# Patient Record
Sex: FEMALE | Race: WHITE | NOT HISPANIC OR LATINO | Employment: OTHER | URBAN - METROPOLITAN AREA
[De-identification: names, ages, dates, MRNs, and addresses within clinical notes are randomized per-mention and may not be internally consistent; named-entity substitution may affect disease eponyms.]

---

## 2017-01-26 ENCOUNTER — ALLSCRIPTS OFFICE VISIT (OUTPATIENT)
Dept: OTHER | Facility: OTHER | Age: 72
End: 2017-01-26

## 2017-02-15 ENCOUNTER — ALLSCRIPTS OFFICE VISIT (OUTPATIENT)
Dept: OTHER | Facility: OTHER | Age: 72
End: 2017-02-15

## 2017-02-15 DIAGNOSIS — N63.0 BREAST LUMP: ICD-10-CM

## 2017-02-15 DIAGNOSIS — Z12.31 ENCOUNTER FOR SCREENING MAMMOGRAM FOR MALIGNANT NEOPLASM OF BREAST: ICD-10-CM

## 2017-03-07 ENCOUNTER — APPOINTMENT (OUTPATIENT)
Dept: LAB | Facility: CLINIC | Age: 72
End: 2017-03-07
Payer: MEDICARE

## 2017-03-07 DIAGNOSIS — I10 ESSENTIAL (PRIMARY) HYPERTENSION: ICD-10-CM

## 2017-03-07 DIAGNOSIS — R73.01 IMPAIRED FASTING GLUCOSE: ICD-10-CM

## 2017-03-07 LAB
ALBUMIN SERPL BCP-MCNC: 3.9 G/DL (ref 3.5–5)
ALP SERPL-CCNC: 77 U/L (ref 46–116)
ALT SERPL W P-5'-P-CCNC: 27 U/L (ref 12–78)
ANION GAP SERPL CALCULATED.3IONS-SCNC: 9 MMOL/L (ref 4–13)
AST SERPL W P-5'-P-CCNC: 20 U/L (ref 5–45)
BASOPHILS # BLD AUTO: 0.02 THOUSANDS/ΜL (ref 0–0.1)
BASOPHILS NFR BLD AUTO: 0 % (ref 0–1)
BILIRUB SERPL-MCNC: 0.4 MG/DL (ref 0.2–1)
BUN SERPL-MCNC: 19 MG/DL (ref 5–25)
CALCIUM SERPL-MCNC: 9.4 MG/DL (ref 8.3–10.1)
CHLORIDE SERPL-SCNC: 101 MMOL/L (ref 100–108)
CHOLEST SERPL-MCNC: 146 MG/DL (ref 50–200)
CO2 SERPL-SCNC: 32 MMOL/L (ref 21–32)
CREAT SERPL-MCNC: 0.99 MG/DL (ref 0.6–1.3)
EOSINOPHIL # BLD AUTO: 0.13 THOUSAND/ΜL (ref 0–0.61)
EOSINOPHIL NFR BLD AUTO: 2 % (ref 0–6)
ERYTHROCYTE [DISTWIDTH] IN BLOOD BY AUTOMATED COUNT: 13.2 % (ref 11.6–15.1)
EST. AVERAGE GLUCOSE BLD GHB EST-MCNC: 137 MG/DL
GFR SERPL CREATININE-BSD FRML MDRD: 55.3 ML/MIN/1.73SQ M
GLUCOSE SERPL-MCNC: 121 MG/DL (ref 65–140)
HBA1C MFR BLD: 6.4 % (ref 4.2–6.3)
HCT VFR BLD AUTO: 39.2 % (ref 34.8–46.1)
HDLC SERPL-MCNC: 59 MG/DL (ref 40–60)
HGB BLD-MCNC: 12.9 G/DL (ref 11.5–15.4)
LDLC SERPL CALC-MCNC: 53 MG/DL (ref 0–100)
LYMPHOCYTES # BLD AUTO: 1.92 THOUSANDS/ΜL (ref 0.6–4.47)
LYMPHOCYTES NFR BLD AUTO: 31 % (ref 14–44)
MCH RBC QN AUTO: 30.1 PG (ref 26.8–34.3)
MCHC RBC AUTO-ENTMCNC: 32.9 G/DL (ref 31.4–37.4)
MCV RBC AUTO: 91 FL (ref 82–98)
MONOCYTES # BLD AUTO: 0.56 THOUSAND/ΜL (ref 0.17–1.22)
MONOCYTES NFR BLD AUTO: 9 % (ref 4–12)
NEUTROPHILS # BLD AUTO: 3.65 THOUSANDS/ΜL (ref 1.85–7.62)
NEUTS SEG NFR BLD AUTO: 58 % (ref 43–75)
PLATELET # BLD AUTO: 323 THOUSANDS/UL (ref 149–390)
PMV BLD AUTO: 9.5 FL (ref 8.9–12.7)
POTASSIUM SERPL-SCNC: 3.6 MMOL/L (ref 3.5–5.3)
PROT SERPL-MCNC: 7.4 G/DL (ref 6.4–8.2)
RBC # BLD AUTO: 4.29 MILLION/UL (ref 3.81–5.12)
SODIUM SERPL-SCNC: 142 MMOL/L (ref 136–145)
TRIGL SERPL-MCNC: 171 MG/DL
WBC # BLD AUTO: 6.28 THOUSAND/UL (ref 4.31–10.16)

## 2017-03-07 PROCEDURE — 36415 COLL VENOUS BLD VENIPUNCTURE: CPT

## 2017-03-07 PROCEDURE — 83036 HEMOGLOBIN GLYCOSYLATED A1C: CPT

## 2017-03-07 PROCEDURE — 85025 COMPLETE CBC W/AUTO DIFF WBC: CPT

## 2017-03-07 PROCEDURE — 80061 LIPID PANEL: CPT

## 2017-03-07 PROCEDURE — 80053 COMPREHEN METABOLIC PANEL: CPT

## 2017-03-23 ENCOUNTER — ALLSCRIPTS OFFICE VISIT (OUTPATIENT)
Dept: OTHER | Facility: OTHER | Age: 72
End: 2017-03-23

## 2017-03-23 DIAGNOSIS — R73.01 IMPAIRED FASTING GLUCOSE: ICD-10-CM

## 2017-03-23 DIAGNOSIS — E78.5 HYPERLIPIDEMIA: ICD-10-CM

## 2017-03-23 DIAGNOSIS — I10 ESSENTIAL (PRIMARY) HYPERTENSION: ICD-10-CM

## 2017-04-18 ENCOUNTER — HOSPITAL ENCOUNTER (OUTPATIENT)
Dept: MAMMOGRAPHY | Facility: CLINIC | Age: 72
Discharge: HOME | End: 2017-04-18
Payer: MEDICARE

## 2017-04-18 ENCOUNTER — HOSPITAL ENCOUNTER (OUTPATIENT)
Dept: ULTRASOUND IMAGING | Facility: CLINIC | Age: 72
Discharge: HOME | End: 2017-04-18
Payer: MEDICARE

## 2017-04-18 DIAGNOSIS — N63.0 BREAST LUMP: ICD-10-CM

## 2017-04-18 DIAGNOSIS — Z12.31 ENCOUNTER FOR SCREENING MAMMOGRAM FOR MALIGNANT NEOPLASM OF BREAST: ICD-10-CM

## 2017-04-18 PROCEDURE — G0202 SCR MAMMO BI INCL CAD: HCPCS

## 2017-04-18 PROCEDURE — 76642 ULTRASOUND BREAST LIMITED: CPT

## 2017-06-29 ENCOUNTER — ALLSCRIPTS OFFICE VISIT (OUTPATIENT)
Dept: OTHER | Facility: OTHER | Age: 72
End: 2017-06-29

## 2017-08-03 ENCOUNTER — ALLSCRIPTS OFFICE VISIT (OUTPATIENT)
Dept: OTHER | Facility: OTHER | Age: 72
End: 2017-08-03

## 2017-08-03 ENCOUNTER — TRANSCRIBE ORDERS (OUTPATIENT)
Dept: ADMINISTRATIVE | Facility: HOSPITAL | Age: 72
End: 2017-08-03

## 2017-08-03 DIAGNOSIS — G44.009 CLUSTER HEADACHE, NOT INTRACTABLE, UNSPECIFIED CHRONICITY PATTERN: Primary | ICD-10-CM

## 2017-08-08 ENCOUNTER — TRANSCRIBE ORDERS (OUTPATIENT)
Dept: SLEEP CENTER | Facility: CLINIC | Age: 72
End: 2017-08-08

## 2017-08-08 DIAGNOSIS — G47.33 OSA AND COPD OVERLAP SYNDROME (HCC): Primary | ICD-10-CM

## 2017-08-08 DIAGNOSIS — J44.9 OSA AND COPD OVERLAP SYNDROME (HCC): Primary | ICD-10-CM

## 2017-08-21 ENCOUNTER — HOSPITAL ENCOUNTER (OUTPATIENT)
Dept: MRI IMAGING | Facility: HOSPITAL | Age: 72
Discharge: HOME | End: 2017-08-21
Payer: MEDICARE

## 2017-08-21 DIAGNOSIS — G44.009 CLUSTER HEADACHE, NOT INTRACTABLE, UNSPECIFIED CHRONICITY PATTERN: ICD-10-CM

## 2017-08-21 PROCEDURE — 70551 MRI BRAIN STEM W/O DYE: CPT

## 2017-08-29 ENCOUNTER — APPOINTMENT (OUTPATIENT)
Dept: LAB | Facility: CLINIC | Age: 72
End: 2017-08-29
Payer: MEDICARE

## 2017-08-29 DIAGNOSIS — R73.01 IMPAIRED FASTING GLUCOSE: ICD-10-CM

## 2017-08-29 DIAGNOSIS — E78.5 HYPERLIPIDEMIA: ICD-10-CM

## 2017-08-29 DIAGNOSIS — I10 ESSENTIAL (PRIMARY) HYPERTENSION: ICD-10-CM

## 2017-08-29 LAB
ALBUMIN SERPL BCP-MCNC: 3.9 G/DL (ref 3.5–5)
ALP SERPL-CCNC: 84 U/L (ref 46–116)
ALT SERPL W P-5'-P-CCNC: 47 U/L (ref 12–78)
ANION GAP SERPL CALCULATED.3IONS-SCNC: 10 MMOL/L (ref 4–13)
AST SERPL W P-5'-P-CCNC: 29 U/L (ref 5–45)
BASOPHILS # BLD AUTO: 0.02 THOUSANDS/ΜL (ref 0–0.1)
BASOPHILS NFR BLD AUTO: 0 % (ref 0–1)
BILIRUB SERPL-MCNC: 0.4 MG/DL (ref 0.2–1)
BUN SERPL-MCNC: 22 MG/DL (ref 5–25)
CALCIUM SERPL-MCNC: 9.4 MG/DL (ref 8.3–10.1)
CHLORIDE SERPL-SCNC: 102 MMOL/L (ref 100–108)
CHOLEST SERPL-MCNC: 151 MG/DL (ref 50–200)
CO2 SERPL-SCNC: 30 MMOL/L (ref 21–32)
CREAT SERPL-MCNC: 1.17 MG/DL (ref 0.6–1.3)
EOSINOPHIL # BLD AUTO: 0.14 THOUSAND/ΜL (ref 0–0.61)
EOSINOPHIL NFR BLD AUTO: 2 % (ref 0–6)
ERYTHROCYTE [DISTWIDTH] IN BLOOD BY AUTOMATED COUNT: 13.1 % (ref 11.6–15.1)
EST. AVERAGE GLUCOSE BLD GHB EST-MCNC: 148 MG/DL
GFR SERPL CREATININE-BSD FRML MDRD: 47 ML/MIN/1.73SQ M
GLUCOSE P FAST SERPL-MCNC: 133 MG/DL (ref 65–99)
HBA1C MFR BLD: 6.8 % (ref 4.2–6.3)
HCT VFR BLD AUTO: 39.2 % (ref 34.8–46.1)
HDLC SERPL-MCNC: 57 MG/DL (ref 40–60)
HGB BLD-MCNC: 13 G/DL (ref 11.5–15.4)
LDLC SERPL CALC-MCNC: 56 MG/DL (ref 0–100)
LYMPHOCYTES # BLD AUTO: 1.77 THOUSANDS/ΜL (ref 0.6–4.47)
LYMPHOCYTES NFR BLD AUTO: 31 % (ref 14–44)
MCH RBC QN AUTO: 30.1 PG (ref 26.8–34.3)
MCHC RBC AUTO-ENTMCNC: 33.2 G/DL (ref 31.4–37.4)
MCV RBC AUTO: 91 FL (ref 82–98)
MONOCYTES # BLD AUTO: 0.48 THOUSAND/ΜL (ref 0.17–1.22)
MONOCYTES NFR BLD AUTO: 8 % (ref 4–12)
NEUTROPHILS # BLD AUTO: 3.4 THOUSANDS/ΜL (ref 1.85–7.62)
NEUTS SEG NFR BLD AUTO: 59 % (ref 43–75)
PLATELET # BLD AUTO: 339 THOUSANDS/UL (ref 149–390)
PMV BLD AUTO: 10.1 FL (ref 8.9–12.7)
POTASSIUM SERPL-SCNC: 3.3 MMOL/L (ref 3.5–5.3)
PROT SERPL-MCNC: 7.7 G/DL (ref 6.4–8.2)
RBC # BLD AUTO: 4.32 MILLION/UL (ref 3.81–5.12)
SODIUM SERPL-SCNC: 142 MMOL/L (ref 136–145)
TRIGL SERPL-MCNC: 192 MG/DL
WBC # BLD AUTO: 5.81 THOUSAND/UL (ref 4.31–10.16)

## 2017-08-29 PROCEDURE — 80061 LIPID PANEL: CPT

## 2017-08-29 PROCEDURE — 85025 COMPLETE CBC W/AUTO DIFF WBC: CPT

## 2017-08-29 PROCEDURE — 83036 HEMOGLOBIN GLYCOSYLATED A1C: CPT

## 2017-08-29 PROCEDURE — 36415 COLL VENOUS BLD VENIPUNCTURE: CPT

## 2017-08-29 PROCEDURE — 80053 COMPREHEN METABOLIC PANEL: CPT

## 2017-08-31 ENCOUNTER — ALLSCRIPTS OFFICE VISIT (OUTPATIENT)
Dept: OTHER | Facility: OTHER | Age: 72
End: 2017-08-31

## 2017-08-31 DIAGNOSIS — G44.201 INTRACTABLE TENSION-TYPE HEADACHE: ICD-10-CM

## 2017-08-31 DIAGNOSIS — R73.01 IMPAIRED FASTING GLUCOSE: ICD-10-CM

## 2017-09-19 ENCOUNTER — ALLSCRIPTS OFFICE VISIT (OUTPATIENT)
Dept: OTHER | Facility: OTHER | Age: 72
End: 2017-09-19

## 2017-09-28 ENCOUNTER — GENERIC CONVERSION - ENCOUNTER (OUTPATIENT)
Dept: OTHER | Facility: OTHER | Age: 72
End: 2017-09-28

## 2017-10-02 ENCOUNTER — GENERIC CONVERSION - ENCOUNTER (OUTPATIENT)
Dept: OTHER | Facility: OTHER | Age: 72
End: 2017-10-02

## 2017-10-04 ENCOUNTER — HOSPITAL ENCOUNTER (OUTPATIENT)
Dept: CT IMAGING | Facility: HOSPITAL | Age: 72
Discharge: HOME | End: 2017-10-04
Attending: PSYCHIATRY & NEUROLOGY
Payer: MEDICARE

## 2017-10-04 DIAGNOSIS — G44.201 INTRACTABLE TENSION-TYPE HEADACHE: ICD-10-CM

## 2017-10-04 PROCEDURE — 70498 CT ANGIOGRAPHY NECK: CPT

## 2017-10-04 PROCEDURE — 70496 CT ANGIOGRAPHY HEAD: CPT

## 2017-10-04 RX ADMIN — IODIXANOL 85 ML: 320 INJECTION, SOLUTION INTRAVASCULAR at 13:00

## 2017-10-11 ENCOUNTER — HOSPITAL ENCOUNTER (OUTPATIENT)
Dept: SLEEP CENTER | Facility: CLINIC | Age: 72
Discharge: HOME | End: 2017-10-11
Payer: MEDICARE

## 2017-10-11 ENCOUNTER — TRANSCRIBE ORDERS (OUTPATIENT)
Dept: SLEEP CENTER | Facility: CLINIC | Age: 72
End: 2017-10-11

## 2017-10-11 DIAGNOSIS — G47.33 OSA (OBSTRUCTIVE SLEEP APNEA): Primary | ICD-10-CM

## 2017-10-11 DIAGNOSIS — G47.33 OSA AND COPD OVERLAP SYNDROME (HCC): ICD-10-CM

## 2017-10-11 DIAGNOSIS — J44.9 OSA AND COPD OVERLAP SYNDROME (HCC): ICD-10-CM

## 2017-10-23 ENCOUNTER — ALLSCRIPTS OFFICE VISIT (OUTPATIENT)
Dept: OTHER | Facility: OTHER | Age: 72
End: 2017-10-23
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
## 2017-10-30 ENCOUNTER — GENERIC CONVERSION - ENCOUNTER (OUTPATIENT)
Dept: OTHER | Facility: OTHER | Age: 72
End: 2017-10-30

## 2017-12-18 ENCOUNTER — ALLSCRIPTS OFFICE VISIT (OUTPATIENT)
Dept: OTHER | Facility: OTHER | Age: 72
End: 2017-12-18
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
## 2017-12-28 ENCOUNTER — ALLSCRIPTS OFFICE VISIT (OUTPATIENT)
Dept: OTHER | Facility: OTHER | Age: 72
End: 2017-12-28

## 2018-01-12 VITALS
BODY MASS INDEX: 37.56 KG/M2 | RESPIRATION RATE: 16 BRPM | WEIGHT: 205.38 LBS | SYSTOLIC BLOOD PRESSURE: 124 MMHG | HEART RATE: 64 BPM | TEMPERATURE: 98.5 F | DIASTOLIC BLOOD PRESSURE: 82 MMHG

## 2018-01-12 VITALS
RESPIRATION RATE: 16 BRPM | HEART RATE: 74 BPM | DIASTOLIC BLOOD PRESSURE: 80 MMHG | WEIGHT: 205 LBS | TEMPERATURE: 98.6 F | BODY MASS INDEX: 37.73 KG/M2 | SYSTOLIC BLOOD PRESSURE: 130 MMHG | HEIGHT: 62 IN

## 2018-01-12 VITALS
WEIGHT: 203.13 LBS | SYSTOLIC BLOOD PRESSURE: 104 MMHG | HEART RATE: 80 BPM | TEMPERATURE: 99 F | BODY MASS INDEX: 37.38 KG/M2 | RESPIRATION RATE: 16 BRPM | HEIGHT: 62 IN | DIASTOLIC BLOOD PRESSURE: 58 MMHG

## 2018-01-12 VITALS
DIASTOLIC BLOOD PRESSURE: 80 MMHG | WEIGHT: 207.5 LBS | BODY MASS INDEX: 38.18 KG/M2 | HEIGHT: 62 IN | RESPIRATION RATE: 16 BRPM | TEMPERATURE: 97.9 F | SYSTOLIC BLOOD PRESSURE: 140 MMHG | HEART RATE: 78 BPM

## 2018-01-12 NOTE — MISCELLANEOUS
Message  Headaches returned after Medrol dose pack per patient  Okay to schedule occipital nerve blocks per pain management  We have only recently stopped doing these, thus could not mention to patient prior that I do not perform them here  Will try her on nortriptyline nightly as well  Discussed with clinical team to notify patient including adverse effects  Plan  Acute intractable tension-type headache, Bilateral occipital neuralgia    · Nortriptyline HCl - 10 MG Oral Capsule; TAKE 1 TABLET NIGHTLY  Bilateral occipital neuralgia    · Nortriptyline HCl - 25 MG Oral Capsule; TAKE 1 CAPSULE AT BEDTIME   Start after  10 mg dose completed    Signatures   Electronically signed by : Maxine Gray MD; Sep 28 2017  6:18PM EST                       (Author)
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
## 2018-01-13 VITALS
OXYGEN SATURATION: 97 % | RESPIRATION RATE: 14 BRPM | BODY MASS INDEX: 38.09 KG/M2 | DIASTOLIC BLOOD PRESSURE: 80 MMHG | HEART RATE: 91 BPM | WEIGHT: 207 LBS | SYSTOLIC BLOOD PRESSURE: 132 MMHG | HEIGHT: 62 IN

## 2018-01-13 VITALS
BODY MASS INDEX: 37.45 KG/M2 | HEIGHT: 62 IN | SYSTOLIC BLOOD PRESSURE: 118 MMHG | DIASTOLIC BLOOD PRESSURE: 72 MMHG | WEIGHT: 203.5 LBS

## 2018-01-13 VITALS
TEMPERATURE: 98.8 F | BODY MASS INDEX: 38.28 KG/M2 | RESPIRATION RATE: 16 BRPM | WEIGHT: 208 LBS | HEIGHT: 62 IN | DIASTOLIC BLOOD PRESSURE: 80 MMHG | SYSTOLIC BLOOD PRESSURE: 164 MMHG | HEART RATE: 82 BPM

## 2018-01-15 VITALS
DIASTOLIC BLOOD PRESSURE: 80 MMHG | WEIGHT: 205 LBS | HEART RATE: 117 BPM | SYSTOLIC BLOOD PRESSURE: 145 MMHG | TEMPERATURE: 99.6 F | HEIGHT: 62 IN | BODY MASS INDEX: 37.73 KG/M2

## 2018-01-17 NOTE — MISCELLANEOUS
Message  B/l occipital neuralgia: resolved with medrol dose pack but returned afterward  Occipital nerve blocks can be very helpful for this  As we cannot perform this at this time due to insurance coverage issues, will refer to pain management  I have tried her on Nortriptyline, and she states not helpful and/or made headaches worse- she can stop this, if not helpful  C/w Tizanidine  Discussed with clinical team, to notify patient  Plan  Bilateral occipital neuralgia    · *1 -  [facility]  Bilateral occipital nerve blocks,  thank you    Status: Active  Requested for: 38EXZ0071  Care Summary provided  : Yes    Signatures   Electronically signed by : Margo Farah MD; Oct  2 2017  2:34PM EST                       (Author)

## 2018-01-18 ENCOUNTER — TRANSCRIBE ORDERS (OUTPATIENT)
Dept: LAB | Facility: CLINIC | Age: 73
End: 2018-01-18

## 2018-01-18 ENCOUNTER — APPOINTMENT (OUTPATIENT)
Dept: LAB | Facility: CLINIC | Age: 73
End: 2018-01-18
Payer: MEDICARE

## 2018-01-18 ENCOUNTER — GENERIC CONVERSION - ENCOUNTER (OUTPATIENT)
Dept: OTHER | Facility: OTHER | Age: 73
End: 2018-01-18

## 2018-01-18 DIAGNOSIS — R73.01 IMPAIRED FASTING GLUCOSE: ICD-10-CM

## 2018-01-18 LAB
ALBUMIN SERPL BCP-MCNC: 3.8 G/DL (ref 3.5–5)
ALP SERPL-CCNC: 97 U/L (ref 46–116)
ALT SERPL W P-5'-P-CCNC: 32 U/L (ref 12–78)
ANION GAP SERPL CALCULATED.3IONS-SCNC: 12 MMOL/L (ref 4–13)
AST SERPL W P-5'-P-CCNC: 31 U/L (ref 5–45)
BASOPHILS # BLD AUTO: 0.03 THOUSANDS/ΜL (ref 0–0.1)
BASOPHILS NFR BLD AUTO: 1 % (ref 0–1)
BILIRUB SERPL-MCNC: 0.5 MG/DL (ref 0.2–1)
BUN SERPL-MCNC: 16 MG/DL (ref 5–25)
CALCIUM SERPL-MCNC: 9.5 MG/DL (ref 8.3–10.1)
CHLORIDE SERPL-SCNC: 100 MMOL/L (ref 100–108)
CHOLEST SERPL-MCNC: 158 MG/DL (ref 50–200)
CO2 SERPL-SCNC: 28 MMOL/L (ref 21–32)
CREAT SERPL-MCNC: 1.02 MG/DL (ref 0.6–1.3)
EOSINOPHIL # BLD AUTO: 0.2 THOUSAND/ΜL (ref 0–0.61)
EOSINOPHIL NFR BLD AUTO: 4 % (ref 0–6)
ERYTHROCYTE [DISTWIDTH] IN BLOOD BY AUTOMATED COUNT: 12.8 % (ref 11.6–15.1)
EST. AVERAGE GLUCOSE BLD GHB EST-MCNC: 151 MG/DL
GFR SERPL CREATININE-BSD FRML MDRD: 55 ML/MIN/1.73SQ M
GLUCOSE P FAST SERPL-MCNC: 159 MG/DL (ref 65–99)
HBA1C MFR BLD: 6.9 % (ref 4.2–6.3)
HCT VFR BLD AUTO: 37.9 % (ref 34.8–46.1)
HDLC SERPL-MCNC: 60 MG/DL (ref 40–60)
HGB BLD-MCNC: 13.1 G/DL (ref 11.5–15.4)
LDLC SERPL CALC-MCNC: 63 MG/DL (ref 0–100)
LYMPHOCYTES # BLD AUTO: 1.49 THOUSANDS/ΜL (ref 0.6–4.47)
LYMPHOCYTES NFR BLD AUTO: 27 % (ref 14–44)
MCH RBC QN AUTO: 30.9 PG (ref 26.8–34.3)
MCHC RBC AUTO-ENTMCNC: 34.6 G/DL (ref 31.4–37.4)
MCV RBC AUTO: 89 FL (ref 82–98)
MONOCYTES # BLD AUTO: 0.49 THOUSAND/ΜL (ref 0.17–1.22)
MONOCYTES NFR BLD AUTO: 9 % (ref 4–12)
NEUTROPHILS # BLD AUTO: 3.26 THOUSANDS/ΜL (ref 1.85–7.62)
NEUTS SEG NFR BLD AUTO: 59 % (ref 43–75)
PLATELET # BLD AUTO: 348 THOUSANDS/UL (ref 149–390)
PMV BLD AUTO: 9.6 FL (ref 8.9–12.7)
POTASSIUM SERPL-SCNC: 3.8 MMOL/L (ref 3.5–5.3)
PROT SERPL-MCNC: 7.5 G/DL (ref 6.4–8.2)
RBC # BLD AUTO: 4.24 MILLION/UL (ref 3.81–5.12)
SODIUM SERPL-SCNC: 140 MMOL/L (ref 136–145)
TRIGL SERPL-MCNC: 175 MG/DL
WBC # BLD AUTO: 5.47 THOUSAND/UL (ref 4.31–10.16)

## 2018-01-18 PROCEDURE — 80053 COMPREHEN METABOLIC PANEL: CPT

## 2018-01-18 PROCEDURE — 83036 HEMOGLOBIN GLYCOSYLATED A1C: CPT

## 2018-01-18 PROCEDURE — 85025 COMPLETE CBC W/AUTO DIFF WBC: CPT

## 2018-01-18 PROCEDURE — 80061 LIPID PANEL: CPT

## 2018-01-18 PROCEDURE — 36415 COLL VENOUS BLD VENIPUNCTURE: CPT

## 2018-01-22 VITALS
HEART RATE: 90 BPM | TEMPERATURE: 99 F | DIASTOLIC BLOOD PRESSURE: 80 MMHG | HEIGHT: 62 IN | SYSTOLIC BLOOD PRESSURE: 133 MMHG | BODY MASS INDEX: 37.79 KG/M2 | WEIGHT: 205.38 LBS

## 2018-01-23 VITALS
DIASTOLIC BLOOD PRESSURE: 60 MMHG | HEIGHT: 62 IN | SYSTOLIC BLOOD PRESSURE: 120 MMHG | BODY MASS INDEX: 38.67 KG/M2 | WEIGHT: 210.13 LBS | OXYGEN SATURATION: 97 % | HEART RATE: 75 BPM

## 2018-01-23 NOTE — MISCELLANEOUS
Message   Recorded as Task   Date: 01/18/2018 10:06 AM, Created By: Reuben Mcdonald   Task Name: Call Back   Assigned To: [facility],Team   Regarding Patient: Willa Ambrocio, Status: Active   Comment:    Lorin Lemos - 18 Jan 2018 10:06 AM     TASK CREATED  [address] [facility]- Patient called stating her headaches are getting worse and would like to s/w someone about what is going on   c/b [phone] or [phone]   [facility] - 18 Jan 2018 10:38 AM     TASK EDITED  S/W pt  Pt stated she got an injection for the pain on the top of her head from the crown to her right eye by JW  Pt stated she went to her eye doctor and the "eye pressure in my eye spiked really high" and now takes eye gtts at  since december  Pt stated last week her headache is "more severe like when they first started in June and July "  Now the pain radiates to the right side of the head to right ear, pain is constant and is 8/10  Pt stated she is taking Excedrin the last few days and it dulls the pain  Pt is taking Gabapentin, MG and B2 which are not helping  Pharmacy on file  Pt stated she called her neurologist on tuesday and has not got a call back yet so she is calling them again today  Please advise  Conor Forte - 18 Jan 2018 12:11 PM     TASK REPLIED TO: Previously Assigned To Conor Forte                      The patient can try increasing her gabapentin to 600 milligrams 1/2 tablet q a m  and 600 milligrams at bedtime  After she is on that dose for 1 week she can increase to 600 milligrams b i d   I can also repeat the occipital nerve blocks if she would like   Linda Hurley - 18 Jan 2018 12:28 PM     TASK EDITED  S/W pt  Advised pt of the same  Pt verbalized understanding  Pt stated she can not repeat the occipital nerve blocks  b/c "it affected the pressure in my eyes and is on an eye drop "  Pt stated she will try the gabapentin     Conor Forte - 18 Jan 2018 12:57 PM     TASK REPLIED TO: Previously Assigned To Brad Joseph                      Aware  Active Problems    1  Acute intractable tension-type headache (339 10) (G44 201)   2  Benign essential hypertension (401 1) (I10)   3  Bilateral hearing loss (389 9) (H91 93)   4  Bilateral occipital neuralgia (723 8) (M54 81)   5  Breast nodule (793 89) (N63 0)   6  Cervicalgia (723 1) (M54 2)   7  Esophageal reflux (530 81) (K21 9)   8  Generalized anxiety disorder (300 02) (F41 1)   9  History of pneumococcal vaccination (V49 89) (Z92 29)   10  Hyperlipidemia (272 4) (E78 5)   11  Impaired fasting glucose (790 21) (R73 01)   12  Post-traumatic headache (339 20) (G44 309)   13  Primary localized osteoarthritis of left knee (715 16) (M17 12)   14  Restless legs syndrome (333 94) (G25 81)   15  Tension type headache (339 10) (G44 209)    Current Meds   1  AmLODIPine Besylate 10 MG Oral Tablet; Take 1 tablet daily; Therapy: 62Gap7362 to (Evaluate:01Apr2018)  Requested for: 57NLI5305; Last   Rx:54Ark9676 Ordered   2  Aspir-81 81 MG Oral Tablet Delayed Release; Therapy: 82KJG3436 to Recorded   3  Atorvastatin Calcium 10 MG Oral Tablet; TAKE 1 TABLET AT BEDTIME; Therapy: 11WEB9037 to (Evaluate:17Mar2018)  Requested for: 87Lll5682; Last   Rx:60Bxq0443 Ordered   4  Gabapentin 600 MG Oral Tablet; TAKE 1 TABLET AT BEDTIME; Therapy: 17IBT3543 to (Evaluate:32Lar1119)  Requested for: 51Xet2449; Last   Rx:18Nis4359 Ordered   5  Klor-Con M20 20 MEQ Oral Tablet Extended Release; Take 1 tablet daily; Therapy: 93ZZO7517 to (Evaluate:50Brq5572)  Requested for: 22Ofb9869; Last   Rx:87Jtb5778 Ordered   6  Losartan Potassium 50 MG Oral Tablet; Take 1 tablet daily; Therapy: 40UXZ8405 to (Evaluate:01Apr2018)  Requested for: 39CAG8515; Last   Rx:69Deu2638 Ordered   7  Omeprazole 20 MG Oral Capsule Delayed Release; Take 1 capsule twice daily; Therapy: 78ZSI1523 to (Evaluate:01Apr2018)  Requested for: 51LYM2878; Last   Rx:03Oct2017 Ordered   8   TiZANidine HCl - 4 MG Oral Tablet; Take 0 5 tab nightly x 5 days, and one tab nightly x 5   days; Therapy: 56Vtl0633 to (Last Rx:51Zjd1106) Ordered   9  Triamterene-HCTZ 37 5-25 MG Oral Tablet; Take 1 tablet daily; Therapy: 58VRW6718 to (Evaluate:01Apr2018)  Requested for: 12MOX3701; Last   Rx:03Oct2017 Ordered    Allergies    1   Penicillins    Signatures   Electronically signed by : Roxana Horowitz, ; Jan 18 2018  1:04PM EST                       (Author)

## 2018-01-31 PROBLEM — M54.2 NECK PAIN: Status: ACTIVE | Noted: 2017-10-23

## 2018-01-31 PROBLEM — N63.0 BREAST NODULE: Status: ACTIVE | Noted: 2017-02-15

## 2018-01-31 PROBLEM — G44.201 ACUTE INTRACTABLE TENSION-TYPE HEADACHE: Status: ACTIVE | Noted: 2017-08-03

## 2018-01-31 PROBLEM — M54.81 BILATERAL OCCIPITAL NEURALGIA: Status: ACTIVE | Noted: 2017-09-19

## 2018-01-31 RX ORDER — FLUOROURACIL 50 MG/G
1 CREAM TOPICAL AS NEEDED
Refills: 1 | COMMUNITY
Start: 2017-11-07

## 2018-01-31 RX ORDER — NORTRIPTYLINE HYDROCHLORIDE 25 MG/1
1 CAPSULE ORAL DAILY
COMMUNITY
Start: 2017-09-28 | End: 2018-02-12 | Stop reason: ALTCHOICE

## 2018-01-31 RX ORDER — TIZANIDINE 4 MG/1
1 TABLET ORAL
COMMUNITY
Start: 2017-09-19 | End: 2018-02-12 | Stop reason: ALTCHOICE

## 2018-01-31 RX ORDER — LATANOPROST 50 UG/ML
1 SOLUTION/ DROPS OPHTHALMIC
Refills: 6 | COMMUNITY
Start: 2018-01-19 | End: 2018-02-12 | Stop reason: ALTCHOICE

## 2018-01-31 RX ORDER — AMLODIPINE BESYLATE 10 MG/1
1 TABLET ORAL DAILY
COMMUNITY
Start: 2012-03-20 | End: 2018-06-07 | Stop reason: ALTCHOICE

## 2018-01-31 RX ORDER — VERAPAMIL HYDROCHLORIDE 40 MG/1
1 TABLET ORAL 2 TIMES DAILY
Refills: 2 | COMMUNITY
Start: 2018-01-19 | End: 2018-01-31 | Stop reason: SDUPTHER

## 2018-01-31 RX ORDER — GABAPENTIN 600 MG/1
600 TABLET ORAL 2 TIMES DAILY
Refills: 2 | COMMUNITY
Start: 2018-01-18 | End: 2018-02-01 | Stop reason: SDUPTHER

## 2018-02-01 ENCOUNTER — OFFICE VISIT (OUTPATIENT)
Dept: FAMILY MEDICINE CLINIC | Facility: CLINIC | Age: 73
End: 2018-02-01
Payer: MEDICARE

## 2018-02-01 ENCOUNTER — TELEPHONE (OUTPATIENT)
Dept: PAIN MEDICINE | Facility: MEDICAL CENTER | Age: 73
End: 2018-02-01

## 2018-02-01 VITALS
HEART RATE: 78 BPM | WEIGHT: 211.6 LBS | TEMPERATURE: 98.8 F | BODY MASS INDEX: 37.49 KG/M2 | RESPIRATION RATE: 16 BRPM | DIASTOLIC BLOOD PRESSURE: 74 MMHG | SYSTOLIC BLOOD PRESSURE: 140 MMHG | HEIGHT: 63 IN

## 2018-02-01 DIAGNOSIS — M54.81 BILATERAL OCCIPITAL NEURALGIA: Primary | ICD-10-CM

## 2018-02-01 DIAGNOSIS — Z00.00 HEALTHCARE MAINTENANCE: ICD-10-CM

## 2018-02-01 DIAGNOSIS — I10 BENIGN ESSENTIAL HYPERTENSION: ICD-10-CM

## 2018-02-01 DIAGNOSIS — M54.81 BILATERAL OCCIPITAL NEURALGIA: ICD-10-CM

## 2018-02-01 DIAGNOSIS — G25.81 RESTLESS LEGS SYNDROME: ICD-10-CM

## 2018-02-01 DIAGNOSIS — F41.1 GENERALIZED ANXIETY DISORDER: ICD-10-CM

## 2018-02-01 DIAGNOSIS — E78.49 OTHER HYPERLIPIDEMIA: ICD-10-CM

## 2018-02-01 DIAGNOSIS — G44.201 ACUTE INTRACTABLE TENSION-TYPE HEADACHE: Primary | ICD-10-CM

## 2018-02-01 DIAGNOSIS — E11.9 TYPE 2 DIABETES MELLITUS WITHOUT COMPLICATION, WITHOUT LONG-TERM CURRENT USE OF INSULIN (HCC): ICD-10-CM

## 2018-02-01 PROCEDURE — 99214 OFFICE O/P EST MOD 30 MIN: CPT | Performed by: FAMILY MEDICINE

## 2018-02-01 RX ORDER — POTASSIUM CHLORIDE 750 MG/1
10 TABLET, EXTENDED RELEASE ORAL 2 TIMES DAILY
Qty: 180 TABLET | Refills: 2 | Status: SHIPPED | OUTPATIENT
Start: 2018-02-01 | End: 2018-10-29 | Stop reason: SDUPTHER

## 2018-02-01 RX ORDER — GABAPENTIN 600 MG/1
600 TABLET ORAL 2 TIMES DAILY
Qty: 60 TABLET | Refills: 2 | Status: SHIPPED | OUTPATIENT
Start: 2018-02-01 | End: 2018-02-12 | Stop reason: SDUPTHER

## 2018-02-01 NOTE — TELEPHONE ENCOUNTER
S/w pt, she is running out of her Gabapentin 600 mg, because her rx was for one at  but [address] [place] [address] increased it to BID after patient developed HA's  Pt requesting new rx  Pt states that she would like us to use her mail order pharmacy for all future rx's but at this time would like this rx called into [facility] b/c she might run out by the time it gets delivered  Pt has about 2-3 days left of rx  Please advise, thank you

## 2018-02-01 NOTE — ASSESSMENT & PLAN NOTE
Still has pain on the right side of head  Follow up with pain management and neurology and continue the medications as prescribed by them

## 2018-02-01 NOTE — TELEPHONE ENCOUNTER
Pt is calling stating that her gabapentin was filled in January and since then Dr Joseph has increased her dosage so now she is running out of her gabapentin   Please call pt back at [phone]

## 2018-02-01 NOTE — PROGRESS NOTES
Assessment/Plan:    Type 2 diabetes mellitus without complication (HCC)  Diabetes type 2 -   Lab Results   Component Value Date    HGBA1C 6 9 (H) 01/18/2018      Patient is controlled  Medication changes include none  Eye exam UTD - yes   Foot exam UTD - yes  ACEi - yes  Statin - yes  Discussed diet and exercise as part of the diabetic lifestyle  Problem List Items Addressed This Visit        Other    Acute intractable tension-type headache - Primary     See A/p for occiptal neuralgai         Relevant Medications    gabapentin (NEURONTIN) 600 MG tablet    nortriptyline (PAMELOR) 25 mg capsule    tiZANidine (ZANAFLEX) 4 mg tablet    amLODIPine (NORVASC) 10 mg tablet    Bilateral occipital neuralgia     Still has pain on the right side of head  Follow up with pain management and neurology and continue the medications as prescribed by them  Subjective:      Patient ID: Beau Zarate is a 71 y o  female  HA - saw neurology and pain management and opthalmology  Still has an ache on the right side of her head - for the most part this is controlled as an ache but does have some sharp pains that are much more severe  Has been on several medications - tizanidine, gabapentin, and nortriptyline she is following their instructions  She was given verapamil to help  The following portions of the patient's history were reviewed and updated as appropriate: She is allergic to penicillins       Review of Systems   Constitutional: Negative for activity change and appetite change  HENT: Negative for congestion  Respiratory: Negative for cough, chest tightness and shortness of breath  Cardiovascular: Negative for chest pain  Genitourinary: Negative for difficulty urinating  Musculoskeletal: Positive for myalgias and neck pain  Negative for arthralgias and back pain  Neurological: Negative for dizziness  Psychiatric/Behavioral: Negative for agitation   The patient is not nervous/anxious  Objective:     Physical Exam   Constitutional: She is oriented to person, place, and time  She appears well-developed and well-nourished  HENT:   Head: Normocephalic  Eyes: EOM are normal  Pupils are equal, round, and reactive to light  Neck: Normal range of motion  Neck supple  Cardiovascular: Normal rate, regular rhythm and normal heart sounds  Abdominal: Soft  Bowel sounds are normal    Musculoskeletal: Normal range of motion  Neurological: She is alert and oriented to person, place, and time  Skin: Skin is warm and dry  Psychiatric: She has a normal mood and affect

## 2018-02-01 NOTE — ASSESSMENT & PLAN NOTE
Diabetes type 2 -   Lab Results   Component Value Date    HGBA1C 6 9 (H) 01/18/2018      Patient is controlled  Medication changes include none  Eye exam UTD - yes - will look for results  Foot exam UTD - will do at next visit  ACEi - yes - arb  Statin - yes  Discussed diet and exercise as part of the diabetic lifestyle

## 2018-02-01 NOTE — ASSESSMENT & PLAN NOTE
Hypertension- patients hypertension is controlled today with a Blood pressure of Blood Pressure: 140/74     on current medications  Continue current medications  Discussed DASH diet and exercise

## 2018-02-01 NOTE — ASSESSMENT & PLAN NOTE
Hyperlipidemia - 71 y  o female being evaluated for HLD  No myalgias  Continue medication  Last LDL   Lab Results   Component Value Date    LDLCALC 63 01/18/2018     ASCVD risk of      Goal of being on a moderate dose statin    is at goal

## 2018-02-06 PROCEDURE — 88305 TISSUE EXAM BY PATHOLOGIST: CPT | Performed by: INTERNAL MEDICINE

## 2018-02-07 ENCOUNTER — LAB REQUISITION (OUTPATIENT)
Dept: LAB | Facility: HOSPITAL | Age: 73
End: 2018-02-07
Payer: MEDICARE

## 2018-02-07 DIAGNOSIS — Z86.010 HISTORY OF COLONIC POLYPS: ICD-10-CM

## 2018-02-07 DIAGNOSIS — K57.30 DIVERTICULOSIS OF LARGE INTESTINE WITHOUT PERFORATION OR ABSCESS WITHOUT BLEEDING: ICD-10-CM

## 2018-02-07 DIAGNOSIS — D12.0 BENIGN NEOPLASM OF CECUM: ICD-10-CM

## 2018-02-12 ENCOUNTER — CLINICAL SUPPORT (OUTPATIENT)
Dept: PAIN MEDICINE | Facility: CLINIC | Age: 73
End: 2018-02-12
Payer: MEDICARE

## 2018-02-12 VITALS
SYSTOLIC BLOOD PRESSURE: 121 MMHG | TEMPERATURE: 98.8 F | WEIGHT: 210 LBS | BODY MASS INDEX: 37.21 KG/M2 | DIASTOLIC BLOOD PRESSURE: 68 MMHG | HEIGHT: 63 IN | HEART RATE: 85 BPM

## 2018-02-12 DIAGNOSIS — M54.81 BILATERAL OCCIPITAL NEURALGIA: Primary | ICD-10-CM

## 2018-02-12 DIAGNOSIS — G25.81 RESTLESS LEGS SYNDROME: ICD-10-CM

## 2018-02-12 PROCEDURE — 99213 OFFICE O/P EST LOW 20 MIN: CPT | Performed by: ANESTHESIOLOGY

## 2018-02-12 RX ORDER — GABAPENTIN 600 MG/1
600 TABLET ORAL 2 TIMES DAILY
Qty: 60 TABLET | Refills: 2 | Status: SHIPPED | OUTPATIENT
Start: 2018-02-12 | End: 2018-07-03 | Stop reason: SDUPTHER

## 2018-02-12 NOTE — PROGRESS NOTES
Assessment:  1  Bilateral occipital neuralgia    2  Restless legs syndrome        Plan:  81-year-old female returning for follow-up of occipital headaches secondary to occipital neuralgia  The patient has had favorable responses to occipital nerve blocks, however with the most recent injection she had noticed that after visiting her ophthalmologist that her intra-ocular pressures were increased  Fortunately this had resolved with some eyedrops C8 given her  She is currently taking gabapentin 600 mg b i d  and has noted some relief with this  She was also taking tizanidine 4 mg 1/2 to 1 tablet q h s  p r n  with some relief  She states that her neurologist the head advised the patient to start titrating down on the gabapentin and she was going to start verapamil, however the patient was finding relief with the gabapentin and is reluctant on titrating off the medication  I did discuss with the patient that it would be reasonable to contact the neurologist to find out if it would be okay to take the verapamil in conjunction with the gabapentin and the patient was agreeable to this  Most of her pain is right-sided today she barely has any pain on the left side  1   The patient may continue with gabapentin 600 mg b i d   2   I advised the patient to contact her neurologist to see if she can start the verapamil without titrating off of the gabapentin since she is finding relief with this  3  The patient may continue with tizanidine 4 mg 1/2 to 1 tablet q h s  p r n   4   We may consider repeating occipital nerve blocks, however we will hold off for now and we will coordinate with her ophthalmologist considering this steroids increased her intra-ocular pressure to make sure that she has a follow-up scheduled and possibly eyedrops ordered to manage the increase in pressure  5  The patient will follow-up with neurology as scheduled  6    I will follow up the patient in 2 months    My impressions and treatment recommendations were discussed in detail with the patient who verbalized understanding and had no further questions  Discharge instructions were provided  I personally saw and examined the patient and I agree with the above discussed plan of care  No orders of the defined types were placed in this encounter  No orders of the defined types were placed in this encounter  History of Present Illness:    Ferny Edmond is a 71 y o  female returning for follow-up of occipital neuralgia  The patient also has a history of restless leg syndrome  She notes that her headaches are mostly right-sided  She denies any visual or auditory symptoms  The patient did have good relief from occipital nerve blocks, however with her most recent injection when she was seen not the ophthalmologist it was noted that her intra-ocular pressure was increased likely as a result from the steroid injections  Fortunately the pressures normalized with some eyedrops  The patient states that her ophthalmologist stated that this did not preclude her from having repeat steroid injections, however she would have to coordinate with him in order to have some of the eyedrops on hand for when she gets the steroid injections  She continues on gabapentin 600 mg b i d  and the patient has noted some relief with this  The patient was also ordered verapamil by her neurologist, however she has not started this yet  Her neurologist also discussed with her about starting to titrate off of the gabapentin, however the patient is reluctant to do so as she is noting relief since increasing the dose of it  The patient rates her pain a 4/10 on the pain is worse in the evening  The pain is constant and described as dull, aching, pressure-like, and shooting  She is getting approximately 20% relief from the current medicine regimen  She denies any side effects from the medications  The pain is worse with manual pressure to her right occipital region  The pain is alleviated somewhat with relaxation  I have personally reviewed and/or updated the patient's past medical history, past surgical history, family history, social history, current medications, allergies, and vital signs today  Other than as stated above, the patient denies any interval changes in medications, medical condition, mental condition, symptoms, or allergies since the last office visit  Review of Systems:    Review of Systems   Respiratory: Negative for shortness of breath  Cardiovascular: Negative for chest pain  Gastrointestinal: Negative for constipation, diarrhea, nausea and vomiting  Musculoskeletal: Negative for arthralgias, gait problem, joint swelling and myalgias  Skin: Negative for rash  Neurological: Negative for dizziness, seizures and weakness  All other systems reviewed and are negative        Patient Active Problem List   Diagnosis    Left medial knee pain    Acute intractable tension-type headache    Benign essential hypertension    Bilateral hearing loss    Bilateral occipital neuralgia    Breast nodule    Neck pain    Esophageal reflux    Generalized anxiety disorder    Hyperlipidemia    Primary localized osteoarthritis of left knee    Restless legs syndrome    [facility]    Healthcare maintenance       Past Medical History:   Diagnosis Date    Arthritis     GERD (gastroesophageal reflux disease)     Hyperlipidemia     Hypertension     Hypokalemia     Left medial knee pain     Restless leg syndrome        Past Surgical History:   Procedure Laterality Date    ANKLE FRACTURE SURGERY Right     ARTHROSCOPY KNEE Left 5/26/2016    Procedure: ARTHROSCOPY KNEE, PARTILA MEDIAL MENISECTOMY;  Surgeon: Ev Casper MD;  Location: [facility];  Service:    [email or web address] CHOLECYSTECTOMY      HYSTERECTOMY      SQUAMOUS CELL CARCINOMA EXCISION      forehead       Family History   Problem Relation Age of Onset  Stroke Mother     Stroke Father     Hypertension Other     Diabetes Other        Social History     Occupational History    Not on file  Social History Main Topics    Smoking status: Never Smoker    Smokeless tobacco: Never Used    Alcohol use Yes      Comment: rare    Drug use: No    Sexual activity: Not on file       Current Outpatient Prescriptions on File Prior to Visit   Medication Sig    amLODIPine (NORVASC) 10 mg tablet Take 1 tablet by mouth daily    aspirin 81 MG tablet Take 81 mg by mouth daily   atorvastatin (LIPITOR) 10 mg tablet 10 mg daily  Atorvastatin Calcium 10 MG Oral Tablet TAKE 1 TABLET DAILY AT     BEDTIME  Quantity: 90;  Refills: 1    Erick Hernandez MD;  Started 31-July-2014 Active     fluorouracil (EFUDEX) 5 % cream Apply 1 application topically daily    gabapentin (NEURONTIN) 600 MG tablet Take 1 tablet (600 mg total) by mouth 2 (two) times a day    HYDROcodone-acetaminophen (NORCO) 5-325 mg per tablet Take 1 tablet by mouth every 6 (six) hours as needed for moderate pain  Max Daily Amount: 4 tablets    losartan (COZAAR) 50 mg tablet 50 mg daily  Losartan Potassium 50 MG Oral Tablet Take 1 tablet daily  Quantity: 90;  Refills: 1    Petra Dawkins DO;  Started 27-June-2012 Active     omeprazole (PriLOSEC) 20 mg delayed release capsule Take 20 mg by mouth daily   potassium chloride (K-DUR,KLOR-CON) 10 mEq tablet Take 1 tablet (10 mEq total) by mouth 2 (two) times a day for 90 days    triamterene-hydrochlorothiazide (MAXZIDE-25) 37 5-25 mg per tablet 1 tablet daily  Triamterene-HCTZ 37 5-25 MG Oral Tablet Take 1 tablet daily  Quantity: 90;  Refills: 1    Petra Dawkins DO;  Started 9-Mar-2012 Active     valACYclovir (VALTREX) 1,000 mg tablet Take 1 tablet by mouth 3 (three) times a day      [DISCONTINUED] latanoprost (XALATAN) 0 005 % ophthalmic solution Administer 1 drop to both eyes daily at bedtime    [DISCONTINUED] nortriptyline (PAMELOR) 25 mg capsule Take 1 capsule by mouth daily    [DISCONTINUED] tiZANidine (ZANAFLEX) 4 mg tablet Take 1 tablet by mouth daily at bedtime     No current facility-administered medications on file prior to visit  Allergies   Allergen Reactions    Penicillins Rash     itching       Physical Exam:    /68   Pulse 85   Temp 98 8 °F (37 1 °C)   Ht 5' 2 8" (1 595 m)   Wt 95 3 kg (210 lb)   BMI 37 44 kg/m²     Constitutional: obese  Eyes: anicteric  HEENT: grossly intact  Neck: supple, symmetric, trachea midline and no masses   Pulmonary:even and unlabored  Cardiovascular:No edema or pitting edema present  Skin:Normal without rashes or lesions and well hydrated  Psychiatric:Mood and affect appropriate  Neurologic:Cranial Nerves II-XII grossly intact  Mild tenderness to palpation over the right occipital groove  Left occipital groove was nontender to palpation  Musculoskeletal:normal gait  Bilateral cervical paraspinals were and trapezii were nontender to palpation      Imaging  Imaging reviewed

## 2018-02-13 ENCOUNTER — TELEPHONE (OUTPATIENT)
Dept: NEUROLOGY | Facility: CLINIC | Age: 73
End: 2018-02-13

## 2018-02-13 RX ORDER — GABAPENTIN 600 MG/1
600 TABLET ORAL 2 TIMES DAILY
Qty: 180 TABLET | Refills: 0 | Status: SHIPPED | OUTPATIENT
Start: 2018-02-13 | End: 2018-02-19 | Stop reason: SDUPTHER

## 2018-02-13 RX ORDER — GABAPENTIN 600 MG/1
600 TABLET ORAL 2 TIMES DAILY
Qty: 180 TABLET | Refills: 1 | Status: SHIPPED | OUTPATIENT
Start: 2018-02-13 | End: 2018-02-13 | Stop reason: SDUPTHER

## 2018-02-13 NOTE — TELEPHONE ENCOUNTER
Please see 1/16 [employer] task for additional info  Pt called questioning if she can take gabapentin + verapamil  Pt states that you had advised her to wean off gabapentin as she started/titrated verapamil  However, I do not see mention of this in task  Per task, pt to start verapamil if gabapentin ineffective  PM recently increased her gabapentin to 600mg BID  She did not start verapamil as suggested on 1/16  States increased gabapentin dose is helping but not completely addressing the HA's  Please see PM 2/12 note for more info  Pt has follow up w/ you 2/19  Please advise      [place] [phone]  [place] [phone]

## 2018-02-13 NOTE — TELEPHONE ENCOUNTER
Ok  Please add verapamil to gabapentin at this time  It is safe  No interaction  It seems PM wants her to be on it and that is fine  If verapamil seems to work, she can then try to wean gabapentin but FIRSTLY start verapamil  Thanks

## 2018-02-13 NOTE — TELEPHONE ENCOUNTER
Pt called stating that she received a call from the pharmacy regarding the rx for Gabapentin  States that the prescription that was sent over was only for a 60 day supply but they require it to be for 90 days  Pt is asking if we can change it or send a new script over  Pt uses [facility] (on file)  Pt can be reached at [phone]

## 2018-02-19 ENCOUNTER — OFFICE VISIT (OUTPATIENT)
Dept: NEUROLOGY | Facility: CLINIC | Age: 73
End: 2018-02-19
Payer: MEDICARE

## 2018-02-19 VITALS
HEIGHT: 63 IN | SYSTOLIC BLOOD PRESSURE: 118 MMHG | DIASTOLIC BLOOD PRESSURE: 66 MMHG | BODY MASS INDEX: 37.56 KG/M2 | WEIGHT: 212 LBS | HEART RATE: 71 BPM

## 2018-02-19 DIAGNOSIS — G44.85 STABBING HEADACHE: ICD-10-CM

## 2018-02-19 DIAGNOSIS — M54.81 BILATERAL OCCIPITAL NEURALGIA: Primary | ICD-10-CM

## 2018-02-19 PROCEDURE — 99213 OFFICE O/P EST LOW 20 MIN: CPT | Performed by: PHYSICIAN ASSISTANT

## 2018-02-19 RX ORDER — VERAPAMIL HYDROCHLORIDE 40 MG/1
40 TABLET ORAL 2 TIMES DAILY
Qty: 180 TABLET | Refills: 1 | Status: SHIPPED | OUTPATIENT
Start: 2018-02-19 | End: 2018-03-16 | Stop reason: DRUGHIGH
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
## 2018-02-19 NOTE — PATIENT INSTRUCTIONS
For headache prevention: continue Verapamil 40 mg 2x/day  Magnesium- 250- 500 mg daily if constipation is a problem with verapamil  B2- also good for headaches 100- 200 mg daily  As needed, continue Exedrin migraine, no more than 1-2 per week

## 2018-02-20 NOTE — ASSESSMENT & PLAN NOTE
She is doing better on a combination of Gabapentin and Verapamil  States verapamil prevents night time headache awakening and she sleeps much better  Has only been on verapamil for 5 days, and only the loading dose thus far, so we will have her continue 40 mg BID as scheduled and give it 4-6 weeks  If not improved- resolved by then we may need to implement an alternative therapy, ie  Topamax, cymbalta or Robaxin  She will continue Excedrin migraine prn, but no more than 1-2 per week to avoid [facility]  She feels she needs it much less after starting the prevention medications gabap and verapamil  We also dsicussed utility of a cervical MRI but will place this on hold unless her sxs worsen  I encouraged her to do PT for probable cervicogenic etiology for HAs

## 2018-02-20 NOTE — ASSESSMENT & PLAN NOTE
So called b/c she describes sharp, transient pains not a/w autonomic features or migrainous features  Verapamil may improved this

## 2018-03-15 ENCOUNTER — TELEPHONE (OUTPATIENT)
Dept: NEUROLOGY | Facility: CLINIC | Age: 73
End: 2018-03-15

## 2018-03-15 DIAGNOSIS — M54.81 BILATERAL OCCIPITAL NEURALGIA: ICD-10-CM

## 2018-03-15 DIAGNOSIS — G44.85 STABBING HEADACHE: ICD-10-CM

## 2018-03-15 NOTE — TELEPHONE ENCOUNTER
----- Message from Jesus Vail sent at 3/15/2018 10:38 AM EDT -----  Regarding: Visit Follow-Up Question  Contact: [phone]  Amber Irving - Just a quick note that all seems to be the same regarding my headache nerve issue since our last visit  I am taking the Gabapentin 600 mg in evening and in morning  Also taking the verapamill 40 mg both evening and morning  Have been trying to give it time for them to work together  It appears that these meds are keeping pain in manageable way but they are not eliminating the pain and its variable intensity, which generally intensifies in later afternoon and early evening  When I was in last visit, one of the things you mentioned to me was an MRI of my neck  After leaving I recalled that the CAT scan I had was of my head and neck so maybe you could review that to see if that covered what you were discussing with me  I wanted you to be aware that although I am struggling through with the above meds and was trying to give them time, there appears to be no difference, so I would like to know Dr Eli Eason suggestions as this is getting close to a year now that I've  been dealing with these issues  I was wondering if you or she would recommend going back on the muscle relaxant Tizanidine which I stopped after Dr Ever Blackburn injections  I would be up for another visit before April 23 if you or Dr Marquis Maurer  have the time to discuss future options  Thank you so much for listening    John Prom

## 2018-03-15 NOTE — TELEPHONE ENCOUNTER
Please ask her to increase verapamil to 80 mg qhs and continue 40 mg qam  I will send muscle relaxant robaxin, unless she found tizanidine helpful  Robaxin typically more helpful  CT head and neck looked just at blood vessels and not nerves, so would be good to look at her c-spine in an MRI at this point  Please schedule earlier with Dr Bria Mosley if possible

## 2018-03-16 RX ORDER — VERAPAMIL HYDROCHLORIDE 80 MG/1
TABLET ORAL
Qty: 180 TABLET | Refills: 1 | Status: SHIPPED | OUTPATIENT
Start: 2018-03-16 | End: 2018-07-06 | Stop reason: SDUPTHER

## 2018-03-16 RX ORDER — METHOCARBAMOL 500 MG/1
TABLET, FILM COATED ORAL
Qty: 90 TABLET | Refills: 0 | Status: SHIPPED | OUTPATIENT
Start: 2018-03-16 | End: 2018-04-09 | Stop reason: CLARIF

## 2018-03-16 NOTE — TELEPHONE ENCOUNTER
Called and spoke with the patient- she is aware to increase her verapamil as directed below  Patient would also like to try the Robaxin since it might be more helpful  Please send a new script for new directions to pts mail order pharmacy, please also send the Robaxin to mail order as well  I did let the patient know about the CT of the head and neck- she states that is good to know, and would like to try the change in medications to see if that helps prior to getting the MRI done  She states she has a visit with you on 4/23, which at that time if the medications are not helping she will discuss with you about completing the MRI

## 2018-03-20 ENCOUNTER — OFFICE VISIT (OUTPATIENT)
Dept: FAMILY MEDICINE CLINIC | Facility: CLINIC | Age: 73
End: 2018-03-20
Payer: MEDICARE

## 2018-03-20 VITALS
BODY MASS INDEX: 37.78 KG/M2 | HEIGHT: 63 IN | RESPIRATION RATE: 16 BRPM | WEIGHT: 213.2 LBS | DIASTOLIC BLOOD PRESSURE: 70 MMHG | SYSTOLIC BLOOD PRESSURE: 120 MMHG | TEMPERATURE: 99.6 F | HEART RATE: 84 BPM

## 2018-03-20 DIAGNOSIS — R60.9 PERIPHERAL EDEMA: Primary | ICD-10-CM

## 2018-03-20 DIAGNOSIS — E11.9 TYPE 2 DIABETES MELLITUS WITHOUT COMPLICATION, WITHOUT LONG-TERM CURRENT USE OF INSULIN (HCC): ICD-10-CM

## 2018-03-20 DIAGNOSIS — I10 HYPERTENSION, UNSPECIFIED TYPE: ICD-10-CM

## 2018-03-20 PROBLEM — R60.0 PERIPHERAL EDEMA: Status: ACTIVE | Noted: 2018-03-20

## 2018-03-20 PROCEDURE — 93000 ELECTROCARDIOGRAM COMPLETE: CPT | Performed by: NURSE PRACTITIONER

## 2018-03-20 PROCEDURE — 99214 OFFICE O/P EST MOD 30 MIN: CPT | Performed by: NURSE PRACTITIONER

## 2018-03-20 NOTE — ASSESSMENT & PLAN NOTE
Dependent by history  May be related to recent medication changes- gabapentin-  was on 600mg at hs and approximately 1 month ago increased to bid for neuralgia by pain specialist  Also neurology increased verapamil dose for headache prophylaxis recently  Patient reports increased verapamil dose really helps in preventing headaches while increased Neurontin has not had much effect and it makes her drowsy  Long term use of amlodiine  Rec- d/c morning dose of gabapentin, track symptoms and follow up 1 week  May try compression socks, foot pumps, elevate legs  DASH diet  Pt to call pain specialist to discuss decreased gabapentin recommendation  Send copy of note to him as well      BMP last eGFR 55  ECG today

## 2018-03-20 NOTE — PROGRESS NOTES
Ganesh Morales 1945 female MRN: [id number]    Family Medicine Acute Visit    ASSESSMENT/PLAN  Problem List Items Addressed This Visit     Type 2 diabetes mellitus without complication ([address] )     Stable, reviewed recent labs/note         Peripheral edema - Primary     Dependent by history  May be related to recent medication changes- gabapentin-  was on 600mg at hs and approximately 1 month ago increased to bid for neuralgia by pain specialist  Also neurology increased verapamil dose for headache prophylaxis recently  Patient reports increased verapamil dose really helps in preventing headaches while increased Neurontin has not had much effect and it makes her drowsy  Long term use of amlodiine  Rec- d/c morning dose of gabapentin, track symptoms and follow up 1 week  May try compression socks, foot pumps, elevate legs  DASH diet  Pt to call pain specialist to discuss decreased gabapentin recommendation  Send copy of note to him as well  BMP last eGFR 55  ECG today             Relevant Orders    Basic metabolic panel      Other Visit Diagnoses     Hypertension, unspecified type        Relevant Orders    POCT ECG (Completed)                Future Appointments  Date Time Provider Jasen Joshua   4/3/2018 2:20 PM DWIGHT [facility]   4/10/2018 1:30 PM DO HIRA Kilgore [facility]   4/23/2018 2:15 PM Cammie Moe PA-C [facility]   10/19/2018 10:30 AM Miguelina Willard MD [facility]          SUBJECTIVE  CC: Leg Swelling (PT states she noticed she had weight gain, she said she gained some weight with in a few weeks ) and Multiple Concerns (with retaining water from medications )      HPI:  Ganesh Morales is a 68 y o  female who presents for LE edema for past couple of weeks  On waking very little swelling  By evening both legs swollen, fingers swollen as well  She reports for her and is concerned it might have something to do with some medications  She denies shortness of breath, chest pain, palpitations  She is followed by Neurology for headaches and is taking verapamil prophylactically  Her dose was recently increased and she reports great relief with the increased dose  She is also followed by pain management for occipital neuralgia  Recently increased her gabapentin from 600 mg at 8 to 600 mg twice daily  She reports she has not noticed any improvement the since increasing the dose  She has been on amlodipine 10 mg without edema in the past           Review of Systems   Constitutional: Negative for activity change, fatigue, fever and unexpected weight change  Respiratory: Negative for cough, chest tightness and shortness of breath  Cardiovascular: Positive for leg swelling  Negative for chest pain and palpitations  Gastrointestinal: Negative for abdominal distention and abdominal pain  Musculoskeletal: Positive for arthralgias  Negative for gait problem  Skin: Negative for rash  Neurological: Positive for headaches ( chronic headaches, followed by Neurology and Pain Management due to concomitant occipital neuralgia)  Negative for dizziness, weakness and light-headedness  Psychiatric/Behavioral: The patient is nervous/anxious (Concerned about swelling)          Historical Information   The patient history was reviewed as follows:  Past Medical History:   Diagnosis Date    Arthritis     GERD (gastroesophageal reflux disease)     Hyperlipidemia     Hypertension     Hypokalemia     Left medial knee pain     Restless leg syndrome          Past Surgical History:   Procedure Laterality Date    ANKLE FRACTURE SURGERY Right     ARTHROSCOPY KNEE Left 5/26/2016    Procedure: ARTHROSCOPY KNEE, PARTILA MEDIAL MENISECTOMY;  Surgeon: Bubba Garcia MD;  Location: [facility];  Service:    Rei Pert CHOLECYSTECTOMY      HYSTERECTOMY      SQUAMOUS CELL CARCINOMA EXCISION      forehead     Family History   Problem Relation Age of Onset    Stroke Mother     Stroke Father     Hypertension Other     Diabetes Other       Social History   History   Alcohol Use    Yes     Comment: rare     History   Drug Use No     History   Smoking Status    Never Smoker   Smokeless Tobacco    Never Used       Medications:     Current Outpatient Prescriptions:     amLODIPine (NORVASC) 10 mg tablet, Take 1 tablet by mouth daily, Disp: , Rfl:     aspirin 81 MG tablet, Take 81 mg by mouth daily  , Disp: , Rfl:     atorvastatin (LIPITOR) 10 mg tablet, 10 mg daily  Atorvastatin Calcium 10 MG Oral Tablet TAKE 1 TABLET DAILY AT     BEDTIME  Quantity: 90;  Refills: 1    Mahamed Murillo MD;  Started 31-July-2014 Active , Disp: , Rfl:     fluorouracil (EFUDEX) 5 % cream, Apply 1 application topically daily, Disp: , Rfl: 1    gabapentin (NEURONTIN) 600 MG tablet, Take 1 tablet (600 mg total) by mouth 2 (two) times a day, Disp: 60 tablet, Rfl: 2    losartan (COZAAR) 50 mg tablet, 50 mg daily  Losartan Potassium 50 MG Oral Tablet Take 1 tablet daily  Quantity: 90;  Refills: 1    Marva Dawkins DO;  Started 27-June-2012 Active , Disp: , Rfl:     methocarbamol (ROBAXIN) 500 mg tablet, 1 tab qhs and up to TID if needed  , Disp: 90 tablet, Rfl: 0    omeprazole (PriLOSEC) 20 mg delayed release capsule, Take 20 mg by mouth daily  , Disp: , Rfl:     potassium chloride (K-DUR,KLOR-CON) 10 mEq tablet, Take 1 tablet (10 mEq total) by mouth 2 (two) times a day for 90 days, Disp: 180 tablet, Rfl: 2    triamterene-hydrochlorothiazide (MAXZIDE-25) 37 5-25 mg per tablet, 1 tablet daily   Triamterene-HCTZ 37 5-25 MG Oral Tablet Take 1 tablet daily  Quantity: 90;  Refills: 1    Marva Dawkins DO;  Started 9-Mar-2012 Active , Disp: , Rfl:     verapamil (CALAN) 80 mg tablet, 40 mg qam and 80 mg qhs x 1 week, then if tolerated 80 mg BID , Disp: 180 tablet, Rfl: 1    Allergies   Allergen Reactions    Penicillins Rash     itching       OBJECTIVE  Vitals:   Vitals:    03/20/18 1107   BP: 120/70 Pulse: 84   Resp: 16   Temp: 99 6 °F (37 6 °C)   Weight: 96 7 kg (213 lb 3 2 oz)   Height: 5' 2 6" (1 59 m)         Physical Exam   Constitutional: She appears well-developed and well-nourished  HENT:   Head: Normocephalic and atraumatic  Mouth/Throat: Oropharynx is clear and moist    Eyes: Conjunctivae are normal  Pupils are equal, round, and reactive to light  No scleral icterus  Neck: Neck supple  No JVD present  No thyromegaly present  Cardiovascular: Normal rate, regular rhythm, normal heart sounds and intact distal pulses  Pulmonary/Chest: Effort normal and breath sounds normal    Abdominal: Soft  Musculoskeletal: She exhibits edema (Edema most prominent ankle with pitting edema extends proximally to below-the-knee pretibial  which is much less prominent)  Skin: Skin is warm and dry  No rash noted  New sign of chronic skin changes in her lower extremities   Psychiatric: She has a normal mood and affect        Recent Results (from the past 1680 hour(s))   CBC and differential    Collection Time: 01/18/18  9:24 AM   Result Value Ref Range    WBC 5 47 4 31 - 10 16 Thousand/uL    RBC 4 24 3 81 - 5 12 Million/uL    Hemoglobin 13 1 11 5 - 15 4 g/dL    Hematocrit 37 9 34 8 - 46 1 %    MCV 89 82 - 98 fL    MCH 30 9 26 8 - 34 3 pg    MCHC 34 6 31 4 - 37 4 g/dL    RDW 12 8 11 6 - 15 1 %    MPV 9 6 8 9 - 12 7 fL    Platelets 528 539 - 011 Thousands/uL    Neutrophils Relative 59 43 - 75 %    Lymphocytes Relative 27 14 - 44 %    Monocytes Relative 9 4 - 12 %    Eosinophils Relative 4 0 - 6 %    Basophils Relative 1 0 - 1 %    Neutrophils Absolute 3 26 1 85 - 7 62 Thousands/µL    Lymphocytes Absolute 1 49 0 60 - 4 47 Thousands/µL    Monocytes Absolute 0 49 0 17 - 1 22 Thousand/µL    Eosinophils Absolute 0 20 0 00 - 0 61 Thousand/µL    Basophils Absolute 0 03 0 00 - 0 10 Thousands/µL   Comprehensive metabolic panel    Collection Time: 01/18/18  9:24 AM   Result Value Ref Range    Sodium 140 136 - 145 mmol/L Potassium 3 8 3 5 - 5 3 mmol/L    Chloride 100 100 - 108 mmol/L    CO2 28 21 - 32 mmol/L    Anion Gap 12 4 - 13 mmol/L    BUN 16 5 - 25 mg/dL    Creatinine 1 02 0 60 - 1 30 mg/dL    Glucose, Fasting 159 (H) 65 - 99 mg/dL    Calcium 9 5 8 3 - 10 1 mg/dL    AST 31 5 - 45 U/L    ALT 32 12 - 78 U/L    Alkaline Phosphatase 97 46 - 116 U/L    Total Protein 7 5 6 4 - 8 2 g/dL    Albumin 3 8 3 5 - 5 0 g/dL    Total Bilirubin 0 50 0 20 - 1 00 mg/dL    eGFR 55 ml/min/1 73sq m   Hemoglobin A1c    Collection Time: 01/18/18  9:24 AM   Result Value Ref Range    Hemoglobin A1C 6 9 (H) 4 2 - 6 3 %     mg/dl   Lipid Panel with Direct LDL reflex    Collection Time: 01/18/18  9:24 AM   Result Value Ref Range    Cholesterol 158 50 - 200 mg/dL    Triglycerides 175 (H) <=150 mg/dL    HDL, Direct 60 40 - 60 mg/dL    LDL Calculated 63 0 - 100 mg/dL   Tissue Exam    Collection Time: 02/06/18  1:00 PM   Result Value Ref Range    Case Report       Surgical Pathology Report                         Case: [id number]                                   Authorizing Provider:  Migue Ball MD           Collected:           02/06/2018 1300              Pathologist:           Ivone Escalante MD              Received:            02/07/2018 1025              Specimen:    Large Intestine, Cecum, Polypectomy in the cecum polyp x3                                  Final Diagnosis       A  Cecal polyp ×3 (polypectomy):     - Portions of tubular adenomas  - No high-grade dysplasia or malignancy identified  Note       Interpretation performed at [facility], [address] [place] [address]  Additional Information       All controls performed with the immunohistochemical stains reported above reacted appropriately  These tests were developed and their performance characteristics determined by [facility], [facility] or [facility]  They may not be cleared or approved by the [place]  Food and Drug Administration  The FDA has determined that such clearance or approval is not necessary  These tests are used for clinical purposes  They should not be regarded as investigational or for research  This laboratory has been approved by CLIA 88, designated as a high-complexity laboratory and is qualified to perform these tests  Gross Description       A  The specimen is received in formalin, labeled with the patient's name and hospital number, and is designated "cecum polyp x3  The specimen consists of 3 tan soft tissue fragments each measuring 0 3 cm  Entirely submitted  One cassette  Note: The estimated total formalin fixation time based upon information provided by the submitting clinician and the standard processing schedule is under 72 hours  MCrites the      Clinical Information       History of colonic polyps  Diverticulosis of large intestine without perforation or abscess without bleeding           Jay Rothman, [facility]   3/22/2018

## 2018-03-22 ENCOUNTER — APPOINTMENT (OUTPATIENT)
Dept: LAB | Facility: CLINIC | Age: 73
End: 2018-03-22
Payer: MEDICARE

## 2018-03-22 ENCOUNTER — TRANSCRIBE ORDERS (OUTPATIENT)
Dept: LAB | Facility: CLINIC | Age: 73
End: 2018-03-22

## 2018-03-22 DIAGNOSIS — R60.9 PERIPHERAL EDEMA: ICD-10-CM

## 2018-03-22 PROBLEM — K63.5 COLON POLYPS: Status: ACTIVE | Noted: 2018-03-22

## 2018-03-22 PROBLEM — K57.90 DIVERTICULOSIS: Status: ACTIVE | Noted: 2018-03-22

## 2018-03-22 LAB
ANION GAP SERPL CALCULATED.3IONS-SCNC: 8 MMOL/L (ref 4–13)
BUN SERPL-MCNC: 19 MG/DL (ref 5–25)
CALCIUM SERPL-MCNC: 9.9 MG/DL (ref 8.3–10.1)
CHLORIDE SERPL-SCNC: 100 MMOL/L (ref 100–108)
CO2 SERPL-SCNC: 32 MMOL/L (ref 21–32)
CREAT SERPL-MCNC: 1.06 MG/DL (ref 0.6–1.3)
GFR SERPL CREATININE-BSD FRML MDRD: 53 ML/MIN/1.73SQ M
GLUCOSE SERPL-MCNC: 187 MG/DL (ref 65–140)
POTASSIUM SERPL-SCNC: 4.8 MMOL/L (ref 3.5–5.3)
SODIUM SERPL-SCNC: 140 MMOL/L (ref 136–145)

## 2018-03-22 PROCEDURE — 36415 COLL VENOUS BLD VENIPUNCTURE: CPT

## 2018-03-22 PROCEDURE — 80048 BASIC METABOLIC PNL TOTAL CA: CPT

## 2018-04-02 DIAGNOSIS — Z12.31 ENCOUNTER FOR SCREENING MAMMOGRAM FOR MALIGNANT NEOPLASM OF BREAST: ICD-10-CM

## 2018-04-02 DIAGNOSIS — N63.0 BREAST LUMP: ICD-10-CM

## 2018-04-03 ENCOUNTER — OFFICE VISIT (OUTPATIENT)
Dept: FAMILY MEDICINE CLINIC | Facility: CLINIC | Age: 73
End: 2018-04-03
Payer: MEDICARE

## 2018-04-03 VITALS
DIASTOLIC BLOOD PRESSURE: 70 MMHG | HEIGHT: 63 IN | SYSTOLIC BLOOD PRESSURE: 120 MMHG | BODY MASS INDEX: 37.49 KG/M2 | RESPIRATION RATE: 16 BRPM | HEART RATE: 78 BPM | WEIGHT: 211.6 LBS | TEMPERATURE: 98.6 F

## 2018-04-03 DIAGNOSIS — R60.9 PERIPHERAL EDEMA: Primary | ICD-10-CM

## 2018-04-03 PROCEDURE — 99213 OFFICE O/P EST LOW 20 MIN: CPT | Performed by: NURSE PRACTITIONER

## 2018-04-03 NOTE — ASSESSMENT & PLAN NOTE
Improved  ECG last visit- NSR, low voltage likely d/t obesity  GFR stable  Remains on verapamil and amlodipine    Monitor  Weight loss  Compression socks  Increase physical activity  Follow up with PCP as planned

## 2018-04-03 NOTE — PROGRESS NOTES
Kayode Rojas 1945 female MRN: [id number]    Family Medicine Follow-up Visit    ASSESSMENT/PLAN  Problem List Items Addressed This Visit     Peripheral edema - Primary     Improved  ECG last visit- NSR, low voltage likely d/t obesity  GFR stable  Remains on verapamil and amlodipine    Monitor  Weight loss  Compression socks  Increase physical activity  Follow up with PCP as planned                   Future Appointments  Date Time Provider Jasen Joshua   4/10/2018 1:30 PM DO HIRA Faustin [facility]   4/23/2018 2:15 PM Feliciano Umaña PA-C NEURO [facility]   10/19/2018 10:30 AM Abdulaziz Rocha MD [facility]          SUBJECTIVE  CC: Follow-up ([facility] follow up )      HPI:  Kayode Rojas is a 68 y o  female who presents for f/u edema  Reports much improved with reducing gabapentin and wearing compression socks  She did not wear them today  Has reviewed the DASH diet, but did eat salty foods for Easter  Denies SOB/CP    Feels good! Review of Systems   Constitutional: Negative for fatigue, fever and unexpected weight change  Respiratory: Negative for cough and shortness of breath  Cardiovascular: Positive for leg swelling (still has some ankle swelling intermittently, but improved)  Negative for chest pain and palpitations  Gastrointestinal: Negative for abdominal distention  Endocrine: Negative          Historical Information   The patient history was reviewed as follows:    Past Medical History:   Diagnosis Date    Arthritis     GERD (gastroesophageal reflux disease)     Hyperlipidemia     Hypertension     Hypokalemia     Left medial knee pain     Restless leg syndrome      Past Surgical History:   Procedure Laterality Date    ANKLE FRACTURE SURGERY Right     ARTHROSCOPY KNEE Left 5/26/2016    Procedure: ARTHROSCOPY KNEE, PARTILA MEDIAL MENISECTOMY;  Surgeon: Jesus Block MD;  Location:  MAIN OR;  Service:    [address] [place] [address]  SQUAMOUS CELL CARCINOMA EXCISION      forehead     Family History   Problem Relation Age of Onset    Stroke Mother     Stroke Father     Hypertension Other     Diabetes Other       Social History   History   Alcohol Use    Yes     Comment: rare     History   Drug Use No     History   Smoking Status    Never Smoker   Smokeless Tobacco    Never Used       Medications:     Current Outpatient Prescriptions:     amLODIPine (NORVASC) 10 mg tablet, Take 1 tablet by mouth daily, Disp: , Rfl:     aspirin 81 MG tablet, Take 81 mg by mouth daily  , Disp: , Rfl:     atorvastatin (LIPITOR) 10 mg tablet, 10 mg daily  Atorvastatin Calcium 10 MG Oral Tablet TAKE 1 TABLET DAILY AT     BEDTIME  Quantity: 90;  Refills: 1    Chelsey Whyte MD;  Started 31-July-2014 Active , Disp: , Rfl:     fluorouracil (EFUDEX) 5 % cream, Apply 1 application topically daily, Disp: , Rfl: 1    gabapentin (NEURONTIN) 600 MG tablet, Take 1 tablet (600 mg total) by mouth 2 (two) times a day, Disp: 60 tablet, Rfl: 2    losartan (COZAAR) 50 mg tablet, 50 mg daily  Losartan Potassium 50 MG Oral Tablet Take 1 tablet daily  Quantity: 90;  Refills: 1    Elvis Dawkins DO;  Started 27-June-2012 Active , Disp: , Rfl:     methocarbamol (ROBAXIN) 500 mg tablet, 1 tab qhs and up to TID if needed  , Disp: 90 tablet, Rfl: 0    omeprazole (PriLOSEC) 20 mg delayed release capsule, Take 20 mg by mouth daily  , Disp: , Rfl:     potassium chloride (K-DUR,KLOR-CON) 10 mEq tablet, Take 1 tablet (10 mEq total) by mouth 2 (two) times a day for 90 days, Disp: 180 tablet, Rfl: 2    triamterene-hydrochlorothiazide (MAXZIDE-25) 37 5-25 mg per tablet, 1 tablet daily   Triamterene-HCTZ 37 5-25 MG Oral Tablet Take 1 tablet daily  Quantity: 90;  Refills: 1    Elvis Dawkins DO;  Started 9-Mar-2012 Active , Disp: , Rfl:     verapamil (CALAN) 80 mg tablet, 40 mg qam and 80 mg qhs x 1 week, then if tolerated 80 mg BID , Disp: 180 tablet, Rfl: 1  Allergies   Allergen Reactions    Penicillins Rash     itching       OBJECTIVE    Vitals:   Vitals:    04/03/18 1410   BP: 120/70   Pulse: 78   Resp: 16   Temp: 98 6 °F (37 °C)   Weight: 96 kg (211 lb 9 6 oz)   Height: 5' 2 6" (1 59 m)           Physical Exam   Constitutional: She appears well-developed and well-nourished  Cardiovascular: Normal rate, regular rhythm, normal heart sounds and intact distal pulses  Pulmonary/Chest: Effort normal and breath sounds normal    Musculoskeletal: She exhibits edema (ankle edema 1+ improved from previous exam)  Skin: Skin is warm and dry  No rash noted  No erythema  Psychiatric: She has a normal mood and affect                    Olamide Farias, [address] [place] [address]   4/5/2018

## 2018-04-09 RX ORDER — BACLOFEN 10 MG/1
10 TABLET ORAL 3 TIMES DAILY
Qty: 15 TABLET | Refills: 0 | Status: SHIPPED | OUTPATIENT
Start: 2018-04-09 | End: 2018-04-10 | Stop reason: ALTCHOICE

## 2018-04-09 NOTE — TELEPHONE ENCOUNTER
Patient states she received methocarbamol in mail for a 30 day supply(she stated she paid $33 for it) pharm told her it would need PA for 90 day-we were aware of this    Stated she has not taken any methocarbamol, says since the verapamil increase on 3/16 she has has seen a "big difference" in decrease in her pain   States she's "not sure if she even needs anything else right now" She wanted to hold off on any new meds right now and would like to discuss at f/u 4/23

## 2018-04-09 NOTE — TELEPHONE ENCOUNTER
Meaning one tab with both medications included? That would be fine    But only if she feels her headaches are resolved  I would want to increase the dose if she has residual headaches  Thanks

## 2018-04-09 NOTE — TELEPHONE ENCOUNTER
Patient called again and stated her pharm Joann Sousa) faxed us a clarification with verapamil and amlodipine 10 mg  I called [facility] spoke to [facility], she stated there is a dual therapy for these two medications  Faxed to us 4/6, verified correct fax number  I was unable to locate under media tab or in common drive  Transferred to clinical dept  Spoke to [facility]  She stated they needed clarification that both Drs were aware of dual therapy with verapamil and amlodipine    Call clinical pharmacist direct line: [place] [phone]; to let her know; able to do a verbal if can't find fax  Patient states she has enough verapamil for about one month

## 2018-04-10 ENCOUNTER — CLINICAL SUPPORT (OUTPATIENT)
Dept: PAIN MEDICINE | Facility: CLINIC | Age: 73
End: 2018-04-10
Payer: MEDICARE

## 2018-04-10 VITALS
DIASTOLIC BLOOD PRESSURE: 70 MMHG | SYSTOLIC BLOOD PRESSURE: 139 MMHG | TEMPERATURE: 99.3 F | BODY MASS INDEX: 37.56 KG/M2 | WEIGHT: 212 LBS | HEIGHT: 63 IN | HEART RATE: 81 BPM

## 2018-04-10 DIAGNOSIS — G25.81 RESTLESS LEGS SYNDROME: ICD-10-CM

## 2018-04-10 DIAGNOSIS — M54.81 BILATERAL OCCIPITAL NEURALGIA: Primary | ICD-10-CM

## 2018-04-10 PROCEDURE — 99213 OFFICE O/P EST LOW 20 MIN: CPT | Performed by: ANESTHESIOLOGY

## 2018-04-10 NOTE — PROGRESS NOTES
Assessment:  1  Bilateral occipital neuralgia    2  Restless legs syndrome        Plan:  67-year-old female returning for follow-up of occipital headaches secondary to occipital neuralgia more so on the right than the left  The patient has had very favorable response to occipital nerve blocks  Her neurologist has also increased her dose of verapamil which has significantly improved her pain  The patient continues with gabapentin 600 milligrams q h s     The patient noticed that she had some increasing lower extremity edema with the 600 milligram b i d  dosing  The deemed has significantly improved since decreasing the dose down to just 600 milligrams q h s   Of note, the patient also has a history of restless leg syndrome which the gabapentin helps with as well  1   The patient will continue with verapamil as ordered by Neurology  2  I advised the patient that she could try decreasing her gabapentin to 300 milligrams q h s  and if the pain is no were she can try eliminating the gabapentin completely  She should stay at the 300 milligram q h s  dosing for 1-2 weeks and if she would like she can then discontinue the medication  However, if the patient's headaches return or if her restless leg syndrome symptoms return she can go back on the gabapentin and titrate back up in reversed  The patient did verbalize understanding  3   I will repeat occipital nerve blocks p r n   4   I will follow up the patient on a p r n  basis      My impressions and treatment recommendations were discussed in detail with the patient who verbalized understanding and had no further questions  Discharge instructions were provided  I personally saw and examined the patient and I agree with the above discussed plan of care  No orders of the defined types were placed in this encounter  No orders of the defined types were placed in this encounter        History of Present Illness:    Isabel Irvin is a 68 y o  female returning for follow-up of occipital headaches secondary to occipital neuralgia and restless leg syndrome  The patient's headaches are worse on the right than the left  She has had very favorable responses to occipital nerve blocks in the past   She has noticed a significant improvement in her headaches and occipital neuralgia since her neurologist increase her verapamil  The patient does continue with gabapentin 600 milligrams q h s     She had noticed some lower extremity edema with the b i d  dosing which has improved since titrating down to q h s     She denies any visual or auditory symptoms  She denies any focal sensory or motor deficits  The patient is getting approximately 80 percent relief of her current pain and symptoms  She denies any side effects other than those listed above from the medications  The patient rates her pain 1/10 on the pain is worse in the evening  The pain is constant and described as dull and aching  The pain is worse with manual palpation to the scalp  The pain is alleviated with relaxation and medications  I have personally reviewed and/or updated the patient's past medical history, past surgical history, family history, social history, current medications, allergies, and vital signs today  Other than as stated above, the patient denies any interval changes in medications, medical condition, mental condition, symptoms, or allergies since the last office visit  Review of Systems:    Review of Systems   Respiratory: Negative for shortness of breath  Cardiovascular: Negative for chest pain  Gastrointestinal: Negative for constipation, diarrhea, nausea and vomiting  Musculoskeletal: Positive for joint swelling (feet & ankles )  Negative for arthralgias, gait problem and myalgias  Difficulty walking, Joint stiffness    Skin: Negative for rash  Neurological: Negative for dizziness, seizures and weakness     All other systems reviewed and are negative  Patient Active Problem List   Diagnosis    Left medial knee pain    Acute intractable tension-type headache    Benign essential hypertension    Bilateral hearing loss    Bilateral occipital neuralgia    Breast nodule    Neck pain    Esophageal reflux    Generalized anxiety disorder    Hyperlipidemia    Primary localized osteoarthritis of left knee    Restless legs syndrome    Type 2 diabetes mellitus without complication ([facility] [address] )    Healthcare maintenance    Stabbing headache    Peripheral edema    Colon polyps    Diverticulosis       Past Medical History:   Diagnosis Date    Abnormal mammogram of right breast 06/29/2017    Acute medial meniscal tear, left, initial encounter 06/29/2017    Arthritis     Generalized anxiety disorder 08/31/2017    GERD (gastroesophageal reflux disease)     Herpes zoster 09/26/2016    Hyperlipidemia     Hypertension     Hypokalemia     Left medial knee pain     Localized osteoarthritis of left knee 03/15/2016    Post-traumatic headache 09/19/2017    Restless leg syndrome        Past Surgical History:   Procedure Laterality Date    ANKLE FRACTURE SURGERY Right     ARTHROSCOPY KNEE Left 5/26/2016    Procedure: ARTHROSCOPY KNEE, PARTILA MEDIAL MENISECTOMY;  Surgeon: Brant Mccrary MD;  [facility];  Service:    [facility] CHOLECYSTECTOMY      HYSTERECTOMY      SQUAMOUS CELL CARCINOMA EXCISION      forehead       Family History   Problem Relation Age of Onset    Stroke Mother     Stroke Father     Hypertension Other     Diabetes Other     Cerebral aneurysm Sister     Uterine cancer Sister        Social History     Occupational History    Not on file       Social History Main Topics    Smoking status: Never Smoker    Smokeless tobacco: Never Used    Alcohol use Yes      Comment: rare    Drug use: No    Sexual activity: Not on file       Current Outpatient Prescriptions on File Prior to Visit   Medication Sig    amLODIPine ([address]) 10 mg tablet Take 1 tablet by mouth daily    aspirin 81 MG tablet Take 81 mg by mouth daily   atorvastatin (LIPITOR) 10 mg tablet 10 mg daily  Atorvastatin Calcium 10 MG Oral Tablet TAKE 1 TABLET DAILY AT     BEDTIME  Quantity: 90;  Refills: 1    Ananya Mathew MD;  Started 31-July-2014 Active     fluorouracil (EFUDEX) 5 % cream Apply 1 application topically daily    gabapentin (NEURONTIN) 600 MG tablet Take 1 tablet (600 mg total) by mouth 2 (two) times a day    losartan (COZAAR) 50 mg tablet 50 mg daily  Losartan Potassium 50 MG Oral Tablet Take 1 tablet daily  Quantity: 90;  Refills: 1    Pranav Dawkins DO;  Started 27-June-2012 Active     omeprazole (PriLOSEC) 20 mg delayed release capsule Take 20 mg by mouth daily   potassium chloride (K-DUR,KLOR-CON) 10 mEq tablet Take 1 tablet (10 mEq total) by mouth 2 (two) times a day for 90 days    triamterene-hydrochlorothiazide (MAXZIDE-25) 37 5-25 mg per tablet 1 tablet daily  Triamterene-HCTZ 37 5-25 MG Oral Tablet Take 1 tablet daily  Quantity: 90;  Refills: 1    Pranav Dawkins DO;  Started 9-Mar-2012 Active     verapamil (CALAN) 80 mg tablet 40 mg qam and 80 mg qhs x 1 week, then if tolerated 80 mg BID   [DISCONTINUED] baclofen 10 mg tablet Take 1 tablet (10 mg total) by mouth 3 (three) times a day     No current facility-administered medications on file prior to visit  Allergies   Allergen Reactions    Penicillins Rash     itching       Physical Exam:    /70   Pulse 81   Temp 99 3 °F (37 4 °C)   Ht 5' 2 6" (1 59 m)   Wt 96 2 kg (212 lb)   BMI 38 04 kg/m²     Constitutional: normal, well developed, well nourished, alert, in no distress and non-toxic and no overt pain behavior    Eyes: anicteric  HEENT: grossly intact  Neck: supple, symmetric, trachea midline and no masses   Pulmonary:even and unlabored  Cardiovascular:No edema or pitting edema present  Skin:Normal without rashes or lesions and well hydrated  Psychiatric:Mood and affect appropriate  Neurologic:Cranial Nerves II-XII grossly intact  Musculoskeletal:normal gait  Mild tenderness to palpation over the superior aspect of the right parietal region of the scalp  Mild tenderness to palpation over the right occipital groove      Imaging  Imaging reviewed

## 2018-04-18 ENCOUNTER — OFFICE VISIT (OUTPATIENT)
Dept: FAMILY MEDICINE CLINIC | Facility: CLINIC | Age: 73
End: 2018-04-18
Payer: MEDICARE

## 2018-04-18 VITALS
TEMPERATURE: 98.4 F | WEIGHT: 212.4 LBS | DIASTOLIC BLOOD PRESSURE: 70 MMHG | BODY MASS INDEX: 37.63 KG/M2 | HEART RATE: 80 BPM | SYSTOLIC BLOOD PRESSURE: 110 MMHG | HEIGHT: 63 IN | RESPIRATION RATE: 16 BRPM

## 2018-04-18 DIAGNOSIS — H01.001 BLEPHARITIS OF RIGHT UPPER EYELID, UNSPECIFIED TYPE: Primary | ICD-10-CM

## 2018-04-18 PROCEDURE — 99213 OFFICE O/P EST LOW 20 MIN: CPT | Performed by: NURSE PRACTITIONER

## 2018-04-18 RX ORDER — ERYTHROMYCIN 5 MG/G
0.5 OINTMENT OPHTHALMIC EVERY 8 HOURS SCHEDULED
Qty: 3.5 G | Refills: 0 | Status: SHIPPED | OUTPATIENT
Start: 2018-04-18 | End: 2018-07-26

## 2018-04-18 NOTE — ASSESSMENT & PLAN NOTE
And conjunctivitis OD  Discussed differentials- including rosacea, bact/viral infection/immune reaction/etc  Erythromycin ophth  Ointment  F/U ophthalm   Friday patient to schedule today  F/U here prn

## 2018-04-18 NOTE — PROGRESS NOTES
Sean Ford 1945 female MRN: [id number]    Family Medicine Acute Visit    ASSESSMENT/PLAN  Problem List Items Addressed This Visit     Blepharitis of right upper eyelid - Primary     And conjunctivitis OD  Discussed differentials- including rosacea, bact/viral infection/immune reaction/etc  Erythromycin ophth  Ointment  F/U ophthalm  Friday patient to schedule today  F/U here prn         Relevant Medications    erythromycin [facility]) ophthalmic ointment                Future Appointments  Date Time Provider Jasen Joshua   4/23/2018 2:15 PM Mich Taylor PA-C [facility]   8/16/2018 9:50 AM Janet Dawkins DO [facility]   10/19/2018 10:30 AM Chintan Hayden MD [facility]          SUBJECTIVE  CC: Eye Problem (PT right eye is swollen and watering and very sore to the touch PT states she has been putting warm rag on it does not seem to be affecting PT vision )      HPI:  Sean Ford is a 68 y o  female who presents for 3 day history of right upper lid irritation and swelling  Denies exposure to chemical/irritating substance, injury  Denies other ENT symptoms, exposure to pink eye  Tx- warm compress with some relief  Now eye is watering d/t irritation  Review of Systems   HENT: Negative for congestion, dental problem, ear pain, facial swelling, hearing loss and sinus pressure  Eyes: Negative for discharge, redness and visual disturbance  See HPI   Respiratory: Negative for cough  Skin: Negative for rash  Neurological: Negative for headaches         Historical Information   The patient history was reviewed as follows:  Past Medical History:   Diagnosis Date    Abnormal mammogram of right breast 06/29/2017    Acute medial meniscal tear, left, initial encounter 06/29/2017    Arthritis     Generalized anxiety disorder 08/31/2017    GERD (gastroesophageal reflux disease)     Herpes zoster 09/26/2016    Hyperlipidemia     Hypertension     Hypokalemia     Left medial knee pain     Localized osteoarthritis of left knee 03/15/2016    Post-traumatic headache 09/19/2017    Restless leg syndrome          Past Surgical History:   Procedure Laterality Date    ANKLE FRACTURE SURGERY Right     ARTHROSCOPY KNEE Left 5/26/2016    Procedure: ARTHROSCOPY KNEE, PARTILA MEDIAL MENISECTOMY;  Surgeon: Ron Cedeno MD;  [facility];  Service:    [employer] CHOLECYSTECTOMY      HYSTERECTOMY      SQUAMOUS CELL CARCINOMA EXCISION      forehead     Family History   Problem Relation Age of Onset    Stroke Mother     Stroke Father     Hypertension Other     Diabetes Other     Cerebral aneurysm Sister     Uterine cancer Sister       Social History   History   Alcohol Use    Yes     Comment: rare     History   Drug Use No     History   Smoking Status    Never Smoker   Smokeless Tobacco    Never Used       Medications:     Current Outpatient Prescriptions:     amLODIPine (NORVASC) 10 mg tablet, Take 1 tablet by mouth daily, Disp: , Rfl:     aspirin 81 MG tablet, Take 81 mg by mouth daily  , Disp: , Rfl:     atorvastatin (LIPITOR) 10 mg tablet, 10 mg daily  Atorvastatin Calcium 10 MG Oral Tablet TAKE 1 TABLET DAILY AT     BEDTIME  Quantity: 90;  Refills: 1    Sally Mayorga MD;  Started 31-July-2014 Active , Disp: , Rfl:     fluorouracil (EFUDEX) 5 % cream, Apply 1 application topically daily, Disp: , Rfl: 1    gabapentin (NEURONTIN) 600 MG tablet, Take 1 tablet (600 mg total) by mouth 2 (two) times a day (Patient taking differently: Take 600 mg by mouth daily  ), Disp: 60 tablet, Rfl: 2    losartan (COZAAR) 50 mg tablet, 50 mg daily  Losartan Potassium 50 MG Oral Tablet Take 1 tablet daily  Quantity: 90;  Refills: 1    Campbell Dawkins April DO;  Started 27-June-2012 Active , Disp: , Rfl:     omeprazole (PriLOSEC) 20 mg delayed release capsule, Take 20 mg by mouth daily  , Disp: , Rfl:     potassium chloride (K-DUR,KLOR-CON) 10 mEq tablet, Take 1 tablet (10 mEq total) by mouth 2 (two) times a day for 90 days, Disp: 180 tablet, Rfl: 2    triamterene-hydrochlorothiazide (MAXZIDE-25) 37 5-25 mg per tablet, 1 tablet daily  Triamterene-HCTZ 37 5-25 MG Oral Tablet Take 1 tablet daily  Quantity: 90;  Refills: 1    Semaj Dawkins DO;  Started 9-Mar-2012 Active , Disp: , Rfl:     verapamil (CALAN) 80 mg tablet, 40 mg qam and 80 mg qhs x 1 week, then if tolerated 80 mg BID , Disp: 180 tablet, Rfl: 1    erythromycin (ROMYCIN) ophthalmic ointment, Administer 0 5 inches to the right eye every 8 (eight) hours, Disp: 3 5 g, Rfl: 0    Allergies   Allergen Reactions    Penicillins Rash     itching       OBJECTIVE  Vitals:   Vitals:    04/18/18 1101   BP: 110/70   Pulse: 80   Resp: 16   Temp: 98 4 °F (36 9 °C)   Weight: 96 3 kg (212 lb 6 4 oz)   Height: 5' 2 6" (1 59 m)         Physical Exam   Constitutional: She appears well-developed and well-nourished  HENT:   Head: Normocephalic and atraumatic  Right Ear: External ear normal    Left Ear: External ear normal    Nose: Nose normal    Mouth/Throat: Oropharynx is clear and moist    bilat hearing aids     Eyes: Pupils are equal, round, and reactive to light  Right eye exhibits discharge (Watery)  Right bulbar and palpebral conjunctiva + mild erythema  Right upper lid +erythema/edema  NO discrete lesion appreciated  Neck: Normal range of motion  Lymphadenopathy:     She has no cervical adenopathy  Skin: Skin is warm and dry  No rash noted                Sofie Collins[address]   4/18/2018

## 2018-04-23 ENCOUNTER — OFFICE VISIT (OUTPATIENT)
Dept: NEUROLOGY | Facility: CLINIC | Age: 73
End: 2018-04-23
Payer: MEDICARE

## 2018-04-23 VITALS
SYSTOLIC BLOOD PRESSURE: 120 MMHG | DIASTOLIC BLOOD PRESSURE: 80 MMHG | WEIGHT: 212 LBS | HEART RATE: 76 BPM | HEIGHT: 63 IN | RESPIRATION RATE: 14 BRPM | BODY MASS INDEX: 37.56 KG/M2

## 2018-04-23 DIAGNOSIS — G25.81 RESTLESS LEGS SYNDROME: ICD-10-CM

## 2018-04-23 DIAGNOSIS — M54.81 BILATERAL OCCIPITAL NEURALGIA: Primary | ICD-10-CM

## 2018-04-23 DIAGNOSIS — G44.85 STABBING HEADACHE: ICD-10-CM

## 2018-04-23 PROCEDURE — 99214 OFFICE O/P EST MOD 30 MIN: CPT | Performed by: PHYSICIAN ASSISTANT
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
## 2018-04-23 NOTE — TELEPHONE ENCOUNTER
[id number] Mariah Diaz for dual therapy with amlodipine and verapamil  Clinical team- could you please call [employer] and make sure we approve this  Fortino Little- is this okay with you that the patient takes both amlodipine and verapamil? I am prescribing the verapamil for stabbing headache and it works well for her  Please see my last note  Thanks

## 2018-04-24 NOTE — TELEPHONE ENCOUNTER
Sandee Parra @[facility] [phone], [facility] w/requested information re dual therapy, approval per both doctors

## 2018-04-25 NOTE — TELEPHONE ENCOUNTER
MARC Bowen,   Yes, I was aware of the verapamil/amlodipine  I asked patient to f/u with you on anticipated duration of treatment with verapamil and if there was any chance it would weaned down/eventually discontinued  Her BP has tolerated the combo, though she did have some increased peripheral edema which was better after reducing gabapentin which can also cause worsening edema    Of course calcium channel blockers may be culprit, but d/t her improved HA symptoms on the verapamil, we reduced the gabapentin which she did not think added any benefit  See my notes for further info    If she's going to continue verapamil long term, she should follow up here in about 6 weeks and we can see about reducing amlodipine and eventually weaning  Our office will contact patient to set up appt if she doesn't already have one

## 2018-04-25 NOTE — TELEPHONE ENCOUNTER
Thanks Erik Page  The patient seemed to prefer to stay on verapamil because it is really helping the stabbing headaches   We could potentially try to wean her off of verapamil after 6 months, because sometimes these headaches improve over time  Do you think we could remain on the dual therapy amlodipine and verapamil for at least 6 more months and then have this discussion again?

## 2018-04-30 ENCOUNTER — OFFICE VISIT (OUTPATIENT)
Dept: FAMILY MEDICINE CLINIC | Facility: CLINIC | Age: 73
End: 2018-04-30
Payer: MEDICARE

## 2018-04-30 VITALS
TEMPERATURE: 103.7 F | DIASTOLIC BLOOD PRESSURE: 60 MMHG | HEART RATE: 82 BPM | HEIGHT: 63 IN | SYSTOLIC BLOOD PRESSURE: 120 MMHG | WEIGHT: 209 LBS | RESPIRATION RATE: 14 BRPM | BODY MASS INDEX: 37.03 KG/M2

## 2018-04-30 DIAGNOSIS — R68.89 FLU-LIKE SYMPTOMS: Primary | ICD-10-CM

## 2018-04-30 PROCEDURE — 99213 OFFICE O/P EST LOW 20 MIN: CPT | Performed by: FAMILY MEDICINE

## 2018-04-30 PROCEDURE — 87631 RESP VIRUS 3-5 TARGETS: CPT | Performed by: FAMILY MEDICINE

## 2018-04-30 RX ORDER — OSELTAMIVIR PHOSPHATE 75 MG/1
75 CAPSULE ORAL EVERY 12 HOURS SCHEDULED
Qty: 10 CAPSULE | Refills: 0 | Status: SHIPPED | OUTPATIENT
Start: 2018-04-30 | End: 2018-05-05

## 2018-04-30 NOTE — PROGRESS NOTES
Kayode Rojas 1945 female MRN: [id number]    Family Medicine Acute Visit    ASSESSMENT/PLAN   Problem List Items Addressed This Visit     Flu-like symptoms - Primary     - Flu swab collected in clinic --> will call pt with results  - Start Tamiflu 75 mg BID x5 days (CrCl 71 8)   - Encourage symptomatic management with good hydration, rest, and Tylenol PRN for fever/myalgia  - Discussed ED precautions including inability to maintain PO hydration, shortness of breath          Relevant Medications    oseltamivir (TAMIFLU) 75 mg capsule    Other Relevant Orders    Influenza A/B and RSV by PCR              Future Appointments  Date Time Provider Jasen Joshua   6/7/2018 10:30 AM DWIGHT Lan [facility]   8/16/2018 9:50 AM Rosalea Romberg, DO [facility]   10/19/2018 10:30 AM Abdulaziz Rocha MD [facility]   10/24/2018 11:15 AM Lb Zarate [facility]          SUBJECTIVE  CC: Fever      HPI:  Kayode Rojas is a 68 y o  female who presents for an acute visit      2 day history of fever (101-102 orally), responsive to Tylenol   Associated with fatigue, chills, rhinorrhea, myalgias, decreased appetites, and loose stools   No known sick contacts  Of note, pt finished course of antibiotics recently for R upper eyelid stye; pt seen by Champ this past Friday (4/27), at which time she was told it was healing and no further intervention was needed     Review of Systems   Constitutional: Positive for appetite change, chills, fatigue and fever  HENT: Positive for congestion and rhinorrhea  Negative for ear pain and sore throat  Respiratory: Negative for cough and shortness of breath  Gastrointestinal: Positive for diarrhea (loose stools)  Negative for abdominal pain, nausea and vomiting  Musculoskeletal: Positive for myalgias         Historical Information   The patient history was reviewed as follows:  Past Medical History:   Diagnosis Date    Abnormal mammogram of right breast 06/29/2017    Acute medial meniscal tear, left, initial encounter 06/29/2017    Arthritis     Generalized anxiety disorder 08/31/2017    GERD (gastroesophageal reflux disease)     Herpes zoster 09/26/2016    Hyperlipidemia     Hypertension     Hypokalemia     Left medial knee pain     Localized osteoarthritis of left knee 03/15/2016    Post-traumatic headache 09/19/2017    Restless leg syndrome          Past Surgical History:   Procedure Laterality Date    ANKLE FRACTURE SURGERY Right     ARTHROSCOPY KNEE Left 5/26/2016    Procedure: ARTHROSCOPY KNEE, PARTILA MEDIAL MENISECTOMY;  Surgeon: Shaina Sherman MD;  Location: [facility];  Service:    Yisel Kelly CHOLECYSTECTOMY      HYSTERECTOMY      SQUAMOUS CELL CARCINOMA EXCISION      forehead     Family History   Problem Relation Age of Onset    Stroke Mother     Stroke Father     Hypertension Other     Diabetes Other     Cerebral aneurysm Sister     Uterine cancer Sister       Social History   History   Alcohol Use    Yes     Comment: rare     History   Drug Use No     History   Smoking Status    Never Smoker   Smokeless Tobacco    Never Used       Medications:     Current Outpatient Prescriptions:     amLODIPine (NORVASC) 10 mg tablet, Take 1 tablet by mouth daily, Disp: , Rfl:     aspirin 81 MG tablet, Take 81 mg by mouth daily  , Disp: , Rfl:     atorvastatin (LIPITOR) 10 mg tablet, 10 mg daily   Atorvastatin Calcium 10 MG Oral Tablet TAKE 1 TABLET DAILY AT     BEDTIME  Quantity: 90;  Refills: 1    Cinthia Culp MD;  Started 31-July-2014 Active , Disp: , Rfl:     erythromycin [facility]) ophthalmic ointment, Administer 0 5 inches to the right eye every 8 (eight) hours, Disp: 3 5 g, Rfl: 0    fluorouracil (EFUDEX) 5 % cream, Apply 1 application topically daily, Disp: , Rfl: 1    gabapentin (NEURONTIN) 600 MG tablet, Take 1 tablet (600 mg total) by mouth 2 (two) times a day (Patient taking differently: Take 600 mg by mouth daily  ), Disp: 60 tablet, Rfl: 2    losartan (COZAAR) 50 mg tablet, 50 mg daily  Losartan Potassium 50 MG Oral Tablet Take 1 tablet daily  Quantity: 90;  Refills: 1    Indra Dawkins DO;  Started 27-June-2012 Active , Disp: , Rfl:     omeprazole (PriLOSEC) 20 mg delayed release capsule, Take 20 mg by mouth daily  , Disp: , Rfl:     oseltamivir (TAMIFLU) 75 mg capsule, Take 1 capsule (75 mg total) by mouth every 12 (twelve) hours for 5 days, Disp: 10 capsule, Rfl: 0    potassium chloride (K-DUR,KLOR-CON) 10 mEq tablet, Take 1 tablet (10 mEq total) by mouth 2 (two) times a day for 90 days, Disp: 180 tablet, Rfl: 2    triamterene-hydrochlorothiazide (MAXZIDE-25) 37 5-25 mg per tablet, 1 tablet daily  Triamterene-HCTZ 37 5-25 MG Oral Tablet Take 1 tablet daily  Quantity: 90;  Refills: 1    Indra Dawkins DO;  Started 9-Mar-2012 Active , Disp: , Rfl:     verapamil (CALAN) 80 mg tablet, 40 mg qam and 80 mg qhs x 1 week, then if tolerated 80 mg BID  (Patient taking differently: 120 mg 3 (three) times a day 40 mg qam and 80 mg qhs x 1 week, then if tolerated 80 mg BID  ), Disp: 180 tablet, Rfl: 1    Allergies   Allergen Reactions    Penicillins Rash     itching       OBJECTIVE  Vitals:   Vitals:    04/30/18 1344   BP: 120/60   BP Location: Left arm   Patient Position: Sitting   Cuff Size: Large   Pulse: 82   Resp: 14   Temp: (!) 103 7 °F (39 8 °C)   TempSrc: Tympanic   Weight: 94 8 kg (209 lb)   Height: 5' 2 6" (1 59 m)         Physical Exam   Constitutional: She is oriented to person, place, and time  She appears well-developed and well-nourished  HENT:   Head: Normocephalic and atraumatic  Right Ear: External ear normal    Left Ear: External ear normal    Nose: Nose normal    Mouth/Throat: Oropharynx is clear and moist    Eyes: Conjunctivae are normal        Cardiovascular: Normal rate and regular rhythm  Pulmonary/Chest: Effort normal and breath sounds normal  No respiratory distress  Abdominal: Soft  Bowel sounds are normal  She exhibits no distension  There is no tenderness  Musculoskeletal: She exhibits no edema  Lymphadenopathy:     She has no cervical adenopathy  Neurological: She is alert and oriented to person, place, and time  Skin: Skin is warm and dry  Psychiatric: She has a normal mood and affect                    Pike John, DO, PGY-2  [facility]   4/30/2018

## 2018-04-30 NOTE — ASSESSMENT & PLAN NOTE
- Flu swab collected in clinic --> will call pt with results  - Start Tamiflu 75 mg BID x5 days (CrCl 71 8)   - Encourage symptomatic management with good hydration, rest, and Tylenol PRN for fever/myalgia  - Discussed ED precautions including inability to maintain PO hydration, shortness of breath

## 2018-05-01 ENCOUNTER — TELEPHONE (OUTPATIENT)
Dept: FAMILY MEDICINE CLINIC | Facility: CLINIC | Age: 73
End: 2018-05-01

## 2018-05-01 DIAGNOSIS — E78.5 HYPERLIPIDEMIA, UNSPECIFIED HYPERLIPIDEMIA TYPE: Primary | ICD-10-CM

## 2018-05-01 LAB
FLUAV AG SPEC QL: NORMAL
FLUBV AG SPEC QL: NORMAL
RSV B RNA SPEC QL NAA+PROBE: NORMAL

## 2018-05-01 RX ORDER — ATORVASTATIN CALCIUM 10 MG/1
10 TABLET, FILM COATED ORAL DAILY
Qty: 90 TABLET | Refills: 1 | Status: SHIPPED | OUTPATIENT
Start: 2018-05-01 | End: 2018-08-07 | Stop reason: SDUPTHER

## 2018-05-17 DIAGNOSIS — Z12.31 ENCOUNTER FOR SCREENING MAMMOGRAM FOR MALIGNANT NEOPLASM OF BREAST: ICD-10-CM

## 2018-05-17 DIAGNOSIS — N60.01 BREAST CYST, RIGHT: Primary | ICD-10-CM

## 2018-05-30 ENCOUNTER — HOSPITAL ENCOUNTER (OUTPATIENT)
Dept: ULTRASOUND IMAGING | Facility: CLINIC | Age: 73
Discharge: HOME | End: 2018-05-30
Payer: MEDICARE

## 2018-05-30 ENCOUNTER — HOSPITAL ENCOUNTER (OUTPATIENT)
Dept: MAMMOGRAPHY | Facility: CLINIC | Age: 73
Discharge: HOME | End: 2018-05-30
Payer: MEDICARE

## 2018-05-30 DIAGNOSIS — N60.01 BREAST CYST, RIGHT: ICD-10-CM

## 2018-05-30 DIAGNOSIS — Z12.31 ENCOUNTER FOR SCREENING MAMMOGRAM FOR MALIGNANT NEOPLASM OF BREAST: ICD-10-CM

## 2018-05-30 PROCEDURE — 77067 SCR MAMMO BI INCL CAD: CPT

## 2018-05-30 PROCEDURE — 76642 ULTRASOUND BREAST LIMITED: CPT

## 2018-06-07 ENCOUNTER — OFFICE VISIT (OUTPATIENT)
Dept: FAMILY MEDICINE CLINIC | Facility: CLINIC | Age: 73
End: 2018-06-07
Payer: MEDICARE

## 2018-06-07 VITALS
BODY MASS INDEX: 37.17 KG/M2 | SYSTOLIC BLOOD PRESSURE: 112 MMHG | HEIGHT: 63 IN | HEART RATE: 80 BPM | RESPIRATION RATE: 14 BRPM | DIASTOLIC BLOOD PRESSURE: 70 MMHG | TEMPERATURE: 97.8 F | WEIGHT: 209.8 LBS

## 2018-06-07 DIAGNOSIS — I10 BENIGN ESSENTIAL HYPERTENSION: ICD-10-CM

## 2018-06-07 DIAGNOSIS — E11.9 TYPE 2 DIABETES MELLITUS WITHOUT COMPLICATION, WITHOUT LONG-TERM CURRENT USE OF INSULIN (HCC): Primary | ICD-10-CM

## 2018-06-07 DIAGNOSIS — R60.9 PERIPHERAL EDEMA: ICD-10-CM

## 2018-06-07 LAB
CREAT UR-MCNC: 213 MG/DL
MICROALBUMIN UR-MCNC: 59.9 MG/L (ref 0–20)
MICROALBUMIN/CREAT 24H UR: 28 MG/G CREATININE (ref 0–30)

## 2018-06-07 PROCEDURE — 82043 UR ALBUMIN QUANTITATIVE: CPT | Performed by: NURSE PRACTITIONER

## 2018-06-07 PROCEDURE — 99213 OFFICE O/P EST LOW 20 MIN: CPT | Performed by: NURSE PRACTITIONER

## 2018-06-07 PROCEDURE — 82570 ASSAY OF URINE CREATININE: CPT | Performed by: NURSE PRACTITIONER

## 2018-06-07 NOTE — ASSESSMENT & PLAN NOTE
On verapamil for neuralgia, will be on for undetermined amount of time, BP well controlled, will d/c norvasc, monitor BP 2 times weekly, follow up in AUG and call prn and if BP elevated greater the 150/90

## 2018-06-07 NOTE — PATIENT INSTRUCTIONS
DASH Eating Plan   AMBULATORY CARE:   The DASH (Dietary Approaches to Stop Hypertension) Eating Plan  is designed to help prevent or lower high blood pressure  It can also help to lower LDL (bad) cholesterol and decrease your risk for heart disease  The plan is low in sodium, sugar, unhealthy fats, and total fat  It is high in potassium, calcium, magnesium, and fiber  These nutrients are added when you eat more fruits, vegetables, and whole grains  Your sodium limit each day: Your dietitian will tell you how much sodium is safe for you to have each day  People with high blood pressure should have no more than 1,500 to 2,300 mg of sodium in a day  A teaspoon (tsp) of salt has 2,300 mg of sodium  This may seem like a difficult goal, but small changes to the foods you eat can make a big difference  Your healthcare provider or dietitian can help you create a meal plan that follows your sodium limit  How to limit sodium:   · Read food labels  Food labels can help you choose foods that are low in sodium  The amount of sodium is listed in milligrams (mg)  The % Daily Value (DV) column tells you how much of your daily needs are met by 1 serving of the food for each nutrient listed  Choose foods that have less than 5% of the DV of sodium  These foods are considered low in sodium  Foods that have 20% or more of the DV of sodium are considered high in sodium  Avoid foods that have more than 300 mg of sodium in each serving  Choose foods that say low-sodium, reduced-sodium, or no salt added on the food label  · Avoid salt  Do not salt food at the table, and add very little salt to foods during cooking  Use herbs and spices, such as onions, garlic, and salt-free seasonings to add flavor to foods  Try lemon or lime juice or vinegar to give foods a tart flavor  Use hot peppers or a small amount of hot pepper sauce to add a spicy flavor to foods  · Ask about salt substitutes    Ask your healthcare provider if you may use salt substitutes  Some salt substitutes have ingredients that can be harmful if you have certain health conditions  · Choose foods carefully at restaurants  Meals from restaurants, especially fast food restaurants, are often high in sodium  Some restaurants have nutrition information that tells you the amount of sodium in their foods  Ask to have your food prepared with less, or no salt  What you need to know about fats:   · Include healthy fats  Examples are unsaturated fats and omega-3 fatty acids  Unsaturated fats are found in soybean, canola, olive, or sunflower oil, and liquid and soft tub margarines  Omega-3 fatty acids are found in fatty fish, such as salmon, tuna, mackerel, and sardines  It is also found in flaxseed oil and ground flaxseed  · Avoid unhealthy fats  Do not eat unhealthy fats, such as saturated fats and trans fats  Saturated fats are found in foods that contain fat from animals  Examples are fatty meats, whole milk, butter, cream, and other dairy foods  It is also found in shortening, stick margarine, palm oil, and coconut oil  Trans fats are found in fried foods, crackers, chips, and baked goods made with margarine or shortening  Foods to include: With the DASH eating plan, you need to eat a certain number of servings from each food group  This will help you get enough of certain nutrients and limit others  The amount of servings you should eat depends on how many calories you need  Your dietitian can tell you how many calories you need  The number of servings listed next to the food groups below are for people who need about 2,000 calories each day    · Grains:  6 to 8 servings (3 of these servings should be whole-grain foods)    ¨ 1 slice of whole-grain bread     ¨ 1 ounce of dry cereal    ¨ ½ cup of cooked cereal, pasta, or brown rice    · Vegetables and fruits:  4 to 5 servings of fruits and 4 to 5 servings of vegetables    ¨ 1 medium fruit    ¨ ½ cup of frozen, canned (no added salt), or chopped fresh vegetables     ¨ ½ cup of fresh, frozen, dried, or canned fruit (canned in light syrup or fruit juice)    ¨ ½ cup of vegetable or fruit juice    · Dairy:  2 to 3 servings    ¨ 1 cup of nonfat (skim) or 1% milk    ¨ 1½ ounces of fat-free or low-fat cheese    ¨ 6 ounces of nonfat or low-fat yogurt    · Lean meat, poultry, and fish:  6 ounces or less    [employer] (chicken, turkey) with no skin    ¨ Fish (especially fatty fish, such as salmon, fresh tuna, or mackerel)    ¨ Lean beef and pork (loin, round, extra lean hamburger)    ¨ Egg whites and egg substitutes    · Nuts, seeds, and legumes:  4 to 5 servings each week    ¨ ½ cup of cooked beans and peas    ¨ 1½ ounces of unsalted nuts    ¨ 2 tablespoons of peanut butter or seeds    · Sweets and added sugars:  5 or less each week    ¨ 1 tablespoon of sugar, jelly, or jam    ¨ ½ cup of sorbet or gelatin    ¨ 1 cup of lemonade    · Fats:  2 to 3 servings each week    ¨ 1 teaspoon of soft margarine or vegetable oil    ¨ 1 tablespoon of mayonnaise    ¨ 2 tablespoons of salad dressing  Foods to avoid:   · Grains:      [employer], such as doughnuts, pastries, cookies, and biscuits (high in fat and sugar)    ¨ Mixes for cornbread and biscuits, packaged foods, such as bread stuffing, rice and pasta mixes, macaroni and cheese, and instant cereals (high in sodium)    · Fruits and vegetables:      ¨ Regular, canned vegetables (high in sodium)    ¨ Sauerkraut, pickled vegetables, and other foods prepared in brine (high in sodium)    ¨ Fried vegetables or vegetables in butter or high-fat sauces    ¨ Fruit in cream or butter sauce (high in fat)    · Dairy:      ¨ Whole milk, 2% milk, and cream (high in fat)    ¨ Regular cheese and processed cheese (high in fat and sodium)    · Meats and protein foods:      ¨ Smoked or cured meat, such as corned beef, norton, ham, hot dogs, and sausage (high in fat and sodium)    ¨ Canned beans and canned meats or spreads, such as potted meats, sardines, anchovies, and imitation seafood (high in sodium)    ¨ Deli or lunch meats, such as bologna, ham, turkey, and roast beef (high in sodium)    ¨ High-fat meat (T-bone steak, regular hamburger, and ribs)    ¨ Whole eggs and egg yolks (high in fat)    · Other:      ¨ Seasonings made with salt, such as garlic salt, celery salt, onion salt, seasoned salt, meat tenderizers, and monosodium glutamate (MSG)    ¨ Miso soup and canned or dried soup mixes (high in sodium)    ¨ Regular soy sauce, barbecue sauce, teriyaki sauce, steak sauce, Worcestershire sauce, and most flavored vinegars (high in sodium)    ¨ Regular condiments, such as mustard, ketchup, and salad dressings (high in sodium)    ¨ Gravy and sauces, such as Shantanu or cheese sauces (high in sodium and fat)    ¨ Drinks high in sugar, such as soda or fruit drinks    [employer] foods, such as salted chips, popcorn, pretzels, pork rinds, salted crackers, and salted nuts    ¨ Frozen foods, such as dinners, entrees, vegetables with sauces, and breaded meats (high in sodium)  Other guidelines to follow:   · Maintain a healthy weight  Your risk for heart disease is higher if you are overweight  Your healthcare provider may suggest that you lose weight if you are overweight  You can lose weight by eating fewer calories and foods that have added sugars and fat  The DASH meal plan can help you do this  Decrease calories by eating smaller portions at each meal and fewer snacks  Ask your healthcare provider for more information about how to lose weight  · Exercise regularly  Regular exercise can help you reach or maintain a healthy weight  Regular exercise can also help decrease your blood pressure and improve your cholesterol levels  Get 30 minutes or more of moderate exercise each day of the week  To lose weight, get at least 60 minutes of exercise  Talk to your healthcare provider about the best exercise program for you      · Limit alcohol  Women should limit alcohol to 1 drink a day  Men should limit alcohol to 2 drinks a day  A drink of alcohol is 12 ounces of beer, 5 ounces of wine, or 1½ ounces of liquor  © 2017 [address] Information is for End User's use only and may not be sold, redistributed or otherwise used for commercial purposes  All illustrations and images included in CareNotes® are the copyrighted property of A D A M , Inc  or Marino Castrejon  The above information is an  only  It is not intended as medical advice for individual conditions or treatments  Talk to your doctor, nurse or pharmacist before following any medical regimen to see if it is safe and effective for you  Chronic Hypertension   WHAT YOU NEED TO KNOW:   Hypertension is high blood pressure (BP)  Your BP is the force of your blood moving against the walls of your arteries  Normal BP is less than 120/80  Prehypertension is between 120/80 and 139/89  Hypertension is 140/90 or higher  Hypertension causes your BP to get so high that your heart has to work much harder than normal  This can damage your heart  Chronic hypertension is a long-term condition that you can control with a healthy lifestyle or medicines  A controlled blood pressure helps protect your organs, such as your heart, lungs, brain, and kidneys  DISCHARGE INSTRUCTIONS:   Call 911 for any of the following:   · You have discomfort in your chest that feels like squeezing, pressure, fullness, or pain  · You become confused or have difficulty speaking  · You suddenly feel lightheaded or have trouble breathing  · You have pain or discomfort in your back, neck, jaw, stomach, or arm  Return to the emergency department if:   · You have a severe headache or vision loss  · You have weakness in an arm or leg  Contact your healthcare provider if:   · You feel faint, dizzy, confused, or drowsy      · You have been taking your BP medicine and your BP is still higher than your healthcare provider says it should be  · You have questions or concerns about your condition or care  Medicines: You may need any of the following:  · Medicine  may be used to help lower your BP  You may need more than one type of medicine  Take the medicine exactly as directed  · Diuretics  help decrease extra fluid that collects in your body  This will help lower your BP  You may urinate more often while you take this medicine  · Cholesterol medicine  helps lower your cholesterol level  A low cholesterol level helps prevent heart disease and makes it easier to control your blood pressure  · Take your medicine as directed  Contact your healthcare provider if you think your medicine is not helping or if you have side effects  Tell him or her if you are allergic to any medicine  Keep a list of the medicines, vitamins, and herbs you take  Include the amounts, and when and why you take them  Bring the list or the pill bottles to follow-up visits  Carry your medicine list with you in case of an emergency  Follow up with your healthcare provider as directed: You will need to return to have your blood pressure checked and to have other lab tests done  Write down your questions so you remember to ask them during your visits  Manage chronic hypertension:  Talk with your healthcare provider about these and other ways to manage hypertension:  · Take your BP at home  Sit and rest for 5 minutes before you take your BP  Extend your arm and support it on a flat surface  Your arm should be at the same level as your heart  Follow the directions that came with your BP monitor  If possible, take at least 2 BP readings each time  Take your BP at least twice a day at the same times each day, such as morning and evening  Keep a record of your BP readings and bring it to your follow-up visits  Ask your healthcare provider what your blood pressure should be             · Limit sodium (salt) as directed  Too much sodium can affect your fluid balance  Check labels to find low-sodium or no-salt-added foods  Some low-sodium foods use potassium salts for flavor  Too much potassium can also cause health problems  Your healthcare provider will tell you how much sodium and potassium are safe for you to have in a day  He or she may recommend that you limit sodium to 2,300 mg a day  · Follow the meal plan recommended by your healthcare provider  A dietitian or your provider can give you more information on low-sodium plans or the DASH (Dietary Approaches to Stop Hypertension) eating plan  The DASH plan is low in sodium, unhealthy fats, and total fat  It is high in potassium, calcium, and fiber  · Exercise to maintain a healthy weight  Exercise at least 30 minutes per day, on most days of the week  This will help decrease your blood pressure  Ask about the best exercise plan for you  · Decrease stress  This may help lower your BP  Learn ways to relax, such as deep breathing or listening to music  · Limit alcohol  Women should limit alcohol to 1 drink a day  Men should limit alcohol to 2 drinks a day  A drink of alcohol is 12 ounces of beer, 5 ounces of wine, or 1½ ounces of liquor  · Do not smoke  Nicotine and other chemicals in cigarettes and cigars can increase your BP and also cause lung damage  Ask your healthcare provider for information if you currently smoke and need help to quit  E-cigarettes or smokeless tobacco still contain nicotine  Talk to your healthcare provider before you use these products  © 2017 [address] Information is for End User's use only and may not be sold, redistributed or otherwise used for commercial purposes  All illustrations and images included in CareNotes® are the copyrighted property of A D A M , Inc  or Marino Castrejon  The above information is an  only   It is not intended as medical advice for individual conditions or treatments  Talk to your doctor, nurse or pharmacist before following any medical regimen to see if it is safe and effective for you

## 2018-06-07 NOTE — PROGRESS NOTES
Tiffani Bah 1945 female MRN: [id number]    Family Medicine Follow-up Visit    ASSESSMENT/PLAN  Problem List Items Addressed This Visit     Benign essential hypertension     On verapamil for neuralgia, will be on for undetermined amount of time, BP well controlled, will d/c norvasc, monitor BP 2 times weekly, follow up in AUG and call prn and if BP elevated greater the 150/90  Type 2 diabetes mellitus without complication ([facility] [address] ) - Primary    Relevant Orders    Microalbumin / creatinine urine ratio    Peripheral edema     Improved some,  but remains ankles+1  Monitor and note if d/c of norvasc improves edema                   Future Appointments  Date Time Provider Jasen Joshua   8/16/2018 9:50 AM Dom Aguilar DO [facility]   10/19/2018 10:30 AM Idania Yee MD [facility]   10/24/2018 11:15 AM Andi Goldman PA-C [facility]          SUBJECTIVE  CC: Follow-up (pt is here to check blood pressure )      HPI:  Tiffani Bah is a 68 y o  female who presents for f/u edema, HTN and medications-- has been on both verapamil and amlodipine for last couple of months  Verapamil has greatly improved her neuralgia and was started by neuro, recently titrated up to  80mg bid  Denies symptoms of hypotension  Edema remains, but seems a little better  Takes all medications as directed  Review of Systems   Constitutional: Negative for fatigue and fever  Eyes: Negative for visual disturbance  Respiratory: Negative for cough and shortness of breath  Cardiovascular: Positive for leg swelling  Negative for chest pain and palpitations  Neurological: Negative for syncope, light-headedness and headaches         Historical Information   The patient history was reviewed as follows:    Past Medical History:   Diagnosis Date    Abnormal mammogram of right breast 06/29/2017    Acute medial meniscal tear, left, initial encounter 06/29/2017    Arthritis     Generalized anxiety disorder 08/31/2017    GERD (gastroesophageal reflux disease)     Herpes zoster 09/26/2016    Hyperlipidemia     Hypertension     Hypokalemia     Left medial knee pain     Localized osteoarthritis of left knee 03/15/2016    Post-traumatic headache 09/19/2017    Restless leg syndrome      Past Surgical History:   Procedure Laterality Date    ANKLE FRACTURE SURGERY Right     ARTHROSCOPY KNEE Left 5/26/2016    Procedure: ARTHROSCOPY KNEE, PARTILA MEDIAL MENISECTOMY;  Surgeon: Romario Miguel MD;  Location: [facility];  Service:    Jennifer Silence CHOLECYSTECTOMY      HYSTERECTOMY      SQUAMOUS CELL CARCINOMA EXCISION      forehead     Family History   Problem Relation Age of Onset    Stroke Mother     Stroke Father     Hypertension Other     Diabetes Other     Cerebral aneurysm Sister     Uterine cancer Sister       Social History   History   Alcohol Use    Yes     Comment: rare     History   Drug Use No     History   Smoking Status    Never Smoker   Smokeless Tobacco    Never Used       Medications:     Current Outpatient Prescriptions:     aspirin 81 MG tablet, Take 81 mg by mouth daily  , Disp: , Rfl:     atorvastatin (LIPITOR) 10 mg tablet, Take 1 tablet (10 mg total) by mouth daily, Disp: 90 tablet, Rfl: 1    fluorouracil (EFUDEX) 5 % cream, Apply 1 application topically daily, Disp: , Rfl: 1    gabapentin (NEURONTIN) 600 MG tablet, Take 1 tablet (600 mg total) by mouth 2 (two) times a day (Patient taking differently: Take 600 mg by mouth daily  ), Disp: 60 tablet, Rfl: 2    losartan (COZAAR) 50 mg tablet, 50 mg daily  Losartan Potassium 50 MG Oral Tablet Take 1 tablet daily  Quantity: 90;  Refills: 1    MetSemaj rios DO;  Started 27-June-2012 Active , Disp: , Rfl:     omeprazole (PriLOSEC) 20 mg delayed release capsule, Take 20 mg by mouth daily  , Disp: , Rfl:     triamterene-hydrochlorothiazide (MAXZIDE-25) 37 5-25 mg per tablet, 1 tablet daily   Triamterene-HCTZ 37 5-25 MG Oral Tablet Take 1 tablet daily  Quantity: 90;  Refills: 1    Chris Dawkins DO;  Started 9-Mar-2012 Active , Disp: , Rfl:     verapamil (CALAN) 80 mg tablet, 40 mg qam and 80 mg qhs x 1 week, then if tolerated 80 mg BID  (Patient taking differently: 120 mg 3 (three) times a day 40 mg qam and 80 mg qhs x 1 week, then if tolerated 80 mg BID  ), Disp: 180 tablet, Rfl: 1    erythromycin (ROMYCIN) ophthalmic ointment, Administer 0 5 inches to the right eye every 8 (eight) hours, Disp: 3 5 g, Rfl: 0    potassium chloride (K-DUR,KLOR-CON) 10 mEq tablet, Take 1 tablet (10 mEq total) by mouth 2 (two) times a day for 90 days, Disp: 180 tablet, Rfl: 2  Allergies   Allergen Reactions    Penicillins Rash     itching       OBJECTIVE    Vitals:   Vitals:    06/07/18 1031   BP: 112/70   BP Location: Left arm   Patient Position: Sitting   Cuff Size: Large   Pulse: 80   Resp: 14   Temp: 97 8 °F (36 6 °C)   TempSrc: Tympanic   Weight: 95 2 kg (209 lb 12 8 oz)   Height: 5' 2 9" (1 598 m)           Physical Exam   Constitutional: She appears well-developed and well-nourished  HENT:   Head: Normocephalic and atraumatic  Eyes: Pupils are equal, round, and reactive to light  Neck: Neck supple  No JVD present  No thyromegaly present  Cardiovascular: Normal rate, regular rhythm, normal heart sounds and intact distal pulses  Pulses:       Dorsalis pedis pulses are 1+ on the right side, and 1+ on the left side  Posterior tibial pulses are 1+ on the right side, and 1+ on the left side  Pulmonary/Chest: Effort normal and breath sounds normal    Musculoskeletal: She exhibits edema (Billat lower leg edema +1)  Feet:   Right Foot:   Skin Integrity: Negative for ulcer, skin breakdown, erythema, warmth, callus or dry skin  Left Foot:   Skin Integrity: Negative for ulcer, skin breakdown, erythema, warmth, callus or dry skin  Skin: Skin is warm and dry  No rash noted  Psychiatric: She has a normal mood and affect  Patient's shoes and socks removed  Right Foot/Ankle   Right Foot Inspection  Skin Exam: skin normal and skin intact no dry skin, no warmth, no callus, no erythema, no maceration, no abnormal color, no pre-ulcer, no ulcer and no callus                          Toe Exam: right toe deformityno swelling, no tenderness and erythema  Sensory     Proprioception: intact   Monofilament testing: intact  Vascular  Capillary refills: < 3 seconds  The right DP pulse is 1+  The right PT pulse is 1+  Left Foot/Ankle  Left Foot Inspection  Skin Exam: skin normal and skin intactno dry skin, no warmth, no erythema, no maceration, normal color, no pre-ulcer, no ulcer and no callus                         Toe Exam: left toe deformityno swelling and no erythema                   Sensory     Proprioception: intact  Monofilament: intact  Vascular  Capillary refills: < 3 seconds  The left DP pulse is 1+  The left PT pulse is 1+  Assign Risk Category:  Deformity present; ;        Risk: 0    5th toes deviated medially and left 5th toe crosses over 4th toe  Sees podiatry as well          Emily Mclaughlin, [address] [place] [address]   6/7/2018

## 2018-06-13 ENCOUNTER — OFFICE VISIT (OUTPATIENT)
Dept: FAMILY MEDICINE CLINIC | Facility: CLINIC | Age: 73
End: 2018-06-13
Payer: MEDICARE

## 2018-06-13 VITALS
SYSTOLIC BLOOD PRESSURE: 128 MMHG | HEART RATE: 80 BPM | HEIGHT: 63 IN | TEMPERATURE: 97.2 F | WEIGHT: 210 LBS | DIASTOLIC BLOOD PRESSURE: 70 MMHG | BODY MASS INDEX: 37.21 KG/M2 | RESPIRATION RATE: 18 BRPM

## 2018-06-13 DIAGNOSIS — I10 BENIGN ESSENTIAL HYPERTENSION: Primary | ICD-10-CM

## 2018-06-13 PROCEDURE — 99213 OFFICE O/P EST LOW 20 MIN: CPT | Performed by: NURSE PRACTITIONER

## 2018-06-13 RX ORDER — AMLODIPINE BESYLATE 10 MG/1
5 TABLET ORAL DAILY
COMMUNITY
End: 2018-07-02 | Stop reason: SDUPTHER

## 2018-06-13 NOTE — PROGRESS NOTES
Harpreet Phil 1945 female MRN: [id number]    Family Medicine Follow-up Visit    ASSESSMENT/PLAN  Problem List Items Addressed This Visit     Benign essential hypertension - Primary     Resume amlodipine at 5mg daily- she already restarted it on her own  F/U 2 weeks and prn  Bring home BP cuff to visit  Plan to wean off amlodipine as she is on verapamil-- will increase losartan if needed to maintain BP control         Relevant Medications    amLODIPine (NORVASC) 10 mg tablet              Future Appointments  Date Time Provider Jasen Joshua   6/27/2018 11:30 AM DWIGHT Torres S [facility]   8/16/2018 9:50 AM Mahad Larose DO S [facility]   10/19/2018 10:30 AM Ian Hayden MD [facility]   10/24/2018 11:15 AM Sharon Rivera PA-C NE[facility]          SUBJECTIVE  CC: Follow-up (B/P check up )      HPI:  Harpreet Phil is a 68 y o  female who presents for follow up BP  Last week discontinued amlodipine  She feels well, but her home BP monitor is reading 150/90s on two occasions  She feels well, edema improved, but nervous about BP and took amlodipine today  Review of Systems   All other systems reviewed and are negative        Historical Information   The patient history was reviewed as follows:    Past Medical History:   Diagnosis Date    Abnormal mammogram of right breast 06/29/2017    Acute medial meniscal tear, left, initial encounter 06/29/2017    Arthritis     Generalized anxiety disorder 08/31/2017    GERD (gastroesophageal reflux disease)     Herpes zoster 09/26/2016    Hyperlipidemia     Hypertension     Hypokalemia     Left medial knee pain     Localized osteoarthritis of left knee 03/15/2016    Post-traumatic headache 09/19/2017    Restless leg syndrome      Past Surgical History:   Procedure Laterality Date    ANKLE FRACTURE SURGERY Right     ARTHROSCOPY KNEE Left 5/26/2016    Procedure: ARTHROSCOPY KNEE, PARTILA MEDIAL MENISECTOMY;  Surgeon: Virginia Crews MD;  Location: [facility];  Service:    Jones CHOLECYSTECTOMY      HYSTERECTOMY      SQUAMOUS CELL CARCINOMA EXCISION      forehead     Family History   Problem Relation Age of Onset    Stroke Mother     Stroke Father     Hypertension Other     Diabetes Other     Cerebral aneurysm Sister     Uterine cancer Sister       Social History   History   Alcohol Use    Yes     Comment: rare     History   Drug Use No     History   Smoking Status    Never Smoker   Smokeless Tobacco    Never Used       Medications:     Current Outpatient Prescriptions:     amLODIPine (NORVASC) 10 mg tablet, Take 5 mg by mouth daily, Disp: , Rfl:     aspirin 81 MG tablet, Take 81 mg by mouth daily  , Disp: , Rfl:     atorvastatin (LIPITOR) 10 mg tablet, Take 1 tablet (10 mg total) by mouth daily, Disp: 90 tablet, Rfl: 1    fluorouracil (EFUDEX) 5 % cream, Apply 1 application topically daily, Disp: , Rfl: 1    gabapentin (NEURONTIN) 600 MG tablet, Take 1 tablet (600 mg total) by mouth 2 (two) times a day (Patient taking differently: Take 600 mg by mouth daily  ), Disp: 60 tablet, Rfl: 2    losartan (COZAAR) 50 mg tablet, 50 mg daily  Losartan Potassium 50 MG Oral Tablet Take 1 tablet daily  Quantity: 90;  Refills: 1    Metzgar, Jan Latimer DO;  Started 27-June-2012 Active , Disp: , Rfl:     omeprazole (PriLOSEC) 20 mg delayed release capsule, Take 20 mg by mouth daily  , Disp: , Rfl:     triamterene-hydrochlorothiazide (MAXZIDE-25) 37 5-25 mg per tablet, 1 tablet daily  Triamterene-HCTZ 37 5-25 MG Oral Tablet Take 1 tablet daily  Quantity: 90;  Refills: 1    Metzgar, Jan Latimer DO;  Started 9-Mar-2012 Active , Disp: , Rfl:     verapamil (CALAN) 80 mg tablet, 40 mg qam and 80 mg qhs x 1 week, then if tolerated 80 mg BID   (Patient taking differently: 120 mg 3 (three) times a day 40 mg qam and 80 mg qhs x 1 week, then if tolerated 80 mg BID  ), Disp: 180 tablet, Rfl: 1    erythromycin (ROMYCIN) ophthalmic ointment, Administer 0 5 inches to the right eye every 8 (eight) hours, Disp: 3 5 g, Rfl: 0    potassium chloride (K-DUR,KLOR-CON) 10 mEq tablet, Take 1 tablet (10 mEq total) by mouth 2 (two) times a day for 90 days, Disp: 180 tablet, Rfl: 2  Allergies   Allergen Reactions    Penicillins Rash     itching       OBJECTIVE    Vitals:   Vitals:    06/13/18 1324   BP: 128/70   Pulse: 80   Resp: 18   Temp: (!) 97 2 °F (36 2 °C)   Weight: 95 3 kg (210 lb)   Height: 5' 2 9" (1 598 m)           Physical Exam   Constitutional: She appears well-developed and well-nourished  Cardiovascular: Normal rate, regular rhythm, normal heart sounds and intact distal pulses  Pulmonary/Chest: Effort normal and breath sounds normal    Musculoskeletal: She exhibits no edema  Vitals reviewed           Rechecked BP in each arm Right 128/60  And Left 128/70          Olamide Farias[address] [facility]   6/13/2018

## 2018-06-13 NOTE — ASSESSMENT & PLAN NOTE
Resume amlodipine at 5mg daily- she already restarted it on her own    F/U 2 weeks and prn  Bring home BP cuff to visit  Plan to wean off amlodipine as she is on verapamil-- will increase losartan if needed to maintain BP control

## 2018-07-02 DIAGNOSIS — I10 HYPERTENSION, UNSPECIFIED TYPE: Primary | ICD-10-CM

## 2018-07-03 ENCOUNTER — TELEPHONE (OUTPATIENT)
Dept: PAIN MEDICINE | Facility: CLINIC | Age: 73
End: 2018-07-03

## 2018-07-03 DIAGNOSIS — G44.85 STABBING HEADACHE: ICD-10-CM

## 2018-07-03 DIAGNOSIS — M54.81 BILATERAL OCCIPITAL NEURALGIA: ICD-10-CM

## 2018-07-03 RX ORDER — AMLODIPINE BESYLATE 10 MG/1
5 TABLET ORAL DAILY
Qty: 90 TABLET | Refills: 1 | Status: SHIPPED | OUTPATIENT
Start: 2018-07-03 | End: 2019-03-07

## 2018-07-03 RX ORDER — LOSARTAN POTASSIUM 50 MG/1
50 TABLET ORAL DAILY
Qty: 90 TABLET | Refills: 1 | Status: SHIPPED | OUTPATIENT
Start: 2018-07-03 | End: 2018-12-26 | Stop reason: SDUPTHER

## 2018-07-03 RX ORDER — GABAPENTIN 600 MG/1
600 TABLET ORAL 2 TIMES DAILY
Qty: 60 TABLET | Refills: 0 | Status: SHIPPED | OUTPATIENT
Start: 2018-07-03 | End: 2018-07-12 | Stop reason: SDUPTHER

## 2018-07-03 RX ORDER — TRIAMTERENE AND HYDROCHLOROTHIAZIDE 37.5; 25 MG/1; MG/1
1 TABLET ORAL DAILY
Qty: 90 TABLET | Refills: 0 | Status: SHIPPED | OUTPATIENT
Start: 2018-07-03 | End: 2018-07-26

## 2018-07-03 NOTE — TELEPHONE ENCOUNTER
Looks like last filled on 2/12 c/ 2 refills  Last office visit was on 4/10/18 c/ JW  No sovs is scheduled  Leonard CISNEROS, your oncall   Thanks

## 2018-07-03 NOTE — TELEPHONE ENCOUNTER
----- Message from Felicia Peraza RN sent at 7/3/2018  9:25 AM EDT -----  Regarding: FW: Prescription Question  Contact: [phone]      ----- Message -----  From: Elfreda Boeck  Sent: 7/3/2018   9:18 AM  To: [facility]  Subject: FW: Prescription Question                            ----- Message -----  From: Griselda Rosario  Sent: 7/2/2018   4:22 PM  To: [facility]  Subject: Prescription Question                            Dr Meka Tejada  My prescription bottle for Gabapentin 600 MG (twice a day) has no refills  Would you please send in a new prescription to [facility] for 600 MG BUT I AM ONLY taking it once a day at bedtime and it seems to be fine since I have been taking the Verapamil for the nerve pain as well  If you have any questions please return email or call me at [phone]  Please confirm  Thank you    Bubba Briseno

## 2018-07-06 DIAGNOSIS — G44.85 STABBING HEADACHE: ICD-10-CM

## 2018-07-06 DIAGNOSIS — M54.81 BILATERAL OCCIPITAL NEURALGIA: ICD-10-CM

## 2018-07-06 RX ORDER — VERAPAMIL HYDROCHLORIDE 40 MG/1
TABLET ORAL
Qty: 360 TABLET | Refills: 1 | Status: SHIPPED | OUTPATIENT
Start: 2018-07-06 | End: 2019-01-25 | Stop reason: SDUPTHER

## 2018-07-06 NOTE — TELEPHONE ENCOUNTER
Message -----   From: Harpreet Villarreal   Sent: 7/2/2018   4:14 PM   To: [facility]   Subject: Prescription Question                             My prescription bottle of Verapamil  80 MG (twice a day) has no refills   Would you please send in a new prescription for 90 tablets and some refills  It seems to b helping  Prescription plan is through [facility]  The prescription is for 80MG twice a day or total of 160 MG  I have been taking 40MG in am and bedtime but 80 around the later afternoon for a total of 160 per day   I see You again on October 24  Please confirm when prescription is sent, and or return email or call at [phone] if any questions   Still have issues w nerve pain but most times under control   Thank you  Allyssa Alejandro

## 2018-07-06 NOTE — TELEPHONE ENCOUNTER
[facility]illed to [facility] the way to told me she takes it in the previous message  Please confirm it was sent, and I would have her do another CMP to look at liver function while on this    At least every 6 months  Thanks!

## 2018-07-06 NOTE — TELEPHONE ENCOUNTER
LMOM for patient to call back  Do want to place CMP lab order, when do you want her to get them done?

## 2018-07-09 ENCOUNTER — TELEPHONE (OUTPATIENT)
Dept: PAIN MEDICINE | Facility: MEDICAL CENTER | Age: 73
End: 2018-07-09

## 2018-07-09 DIAGNOSIS — M54.81 BILATERAL OCCIPITAL NEURALGIA: ICD-10-CM

## 2018-07-09 NOTE — TELEPHONE ENCOUNTER
Voicemail from [address] [place] [address] with mail in pharmacy 07/09/18 9:32am: requesting call back regarding Gabapentin prescription, please call Angie at [phone]

## 2018-07-12 RX ORDER — GABAPENTIN 600 MG/1
600 TABLET ORAL DAILY
Qty: 90 TABLET | Refills: 1 | Status: SHIPPED | OUTPATIENT
Start: 2018-07-12 | End: 2018-10-19 | Stop reason: DRUGHIGH

## 2018-07-12 RX ORDER — GABAPENTIN 600 MG/1
600 TABLET ORAL DAILY
Qty: 90 TABLET | Refills: 1 | Status: SHIPPED | OUTPATIENT
Start: 2018-07-12 | End: 2018-07-12 | Stop reason: SDUPTHER

## 2018-07-12 NOTE — TELEPHONE ENCOUNTER
Patient called stating she does not want a 2 month supply  She actually wants a 90 day supply  She states she is only taking 1 pill once daily

## 2018-07-12 NOTE — TELEPHONE ENCOUNTER
Angie called back, attempted to reach nurse  She ask to leave a detailed message  Angie states that Dr Mannie Barraza wrote a script for a 1 month supply but pt is asking for a 2 month supply of the Gabapentin 600 mg  Angie will either need to know how to fill script or a new script will need to be sent   Please advise and call angie and leave a detailed message at [phone]

## 2018-07-26 ENCOUNTER — CLINICAL SUPPORT (OUTPATIENT)
Dept: PAIN MEDICINE | Facility: CLINIC | Age: 73
End: 2018-07-26
Payer: MEDICARE

## 2018-07-26 VITALS
BODY MASS INDEX: 37.14 KG/M2 | WEIGHT: 209 LBS | TEMPERATURE: 98.2 F | HEART RATE: 68 BPM | SYSTOLIC BLOOD PRESSURE: 146 MMHG | DIASTOLIC BLOOD PRESSURE: 71 MMHG

## 2018-07-26 DIAGNOSIS — M54.81 BILATERAL OCCIPITAL NEURALGIA: Primary | ICD-10-CM

## 2018-07-26 PROCEDURE — 99213 OFFICE O/P EST LOW 20 MIN: CPT | Performed by: ANESTHESIOLOGY

## 2018-07-26 PROCEDURE — 64405 NJX AA&/STRD GR OCPL NRV: CPT | Performed by: ANESTHESIOLOGY

## 2018-07-26 RX ORDER — LATANOPROST 50 UG/ML
1 SOLUTION/ DROPS OPHTHALMIC
Refills: 6 | COMMUNITY
Start: 2018-07-23 | End: 2019-12-05

## 2018-07-26 RX ORDER — POTASSIUM CHLORIDE 750 MG/1
TABLET, EXTENDED RELEASE ORAL
COMMUNITY
Start: 2018-07-02 | End: 2019-12-05

## 2018-07-26 RX ORDER — TRIAMTERENE AND HYDROCHLOROTHIAZIDE 37.5; 25 MG/1; MG/1
1 CAPSULE ORAL DAILY
Refills: 0 | COMMUNITY
Start: 2018-07-04 | End: 2018-10-17 | Stop reason: SDUPTHER

## 2018-07-26 NOTE — PROGRESS NOTES
Pt is c/o head pain  Assessment:  1  Bilateral occipital neuralgia        Plan:  61-year-old female returning for follow-up of occipital headaches secondary to occipital neuralgia more so on the right than the left  Today, the patient states that she only has pain on the right side  The patient has had very favorable results to occipital nerve blocks in the past   She is currently taking verapamil 40 mg in the morning and 80 mg at bedtime, and gabapentin 600 mg q h s     This gives her approximately 50% relief in she denies any side effects from the medications  The patient would like to have a repeat occipital nerve block on the right since she is going on vacation this weekend  1   I performed a right occipital nerve block while the patient was in the office today  Please see procedure note below  Indication: Occipital headaches  Preoperative diagnosis: Occipital neuralgia  Postoperative diagnosis: Occipital neuralgia  Procedure:  Right greater occipital nerve block    After discussing the risks, benefits, and alternatives to the procedure, the patient expressed understanding and wished to proceed  A procedural pause was conducted to verify: Correct patient identity, procedure to be performed and as applicable, correct side and site, correct patient position, and availability of implants, special equipment or special requirements  The appropriate side of the occiput was sterilely prepped using alcohol  The occipital artery was palpated, then a berna was placed 2 cm lateral to the greater occipital protuberance  A 1-1/2 inch 25 gauge needle was used to inject a total of 2 mL of 0 25% bupivacaine and 40 mg of Depo-Medrol after negative aspiration of heme, CSF, or other bodily fluids  The needle was then fanned in the direction of the greater occipital nerve  Vital signs were monitored before, during, and after the procedure and remained stable at all points   The patient was discharged with no complications  The patient received a total steroid dose of 40 mg of Depo-Medrol  Complete risks and benefits including bleeding, infection, tissue reaction, nerve injury and allergic reaction were discussed  The approach was demonstrated using models and literature was provided  Verbal and written consent was obtained  History of Present Illness: The patient is a 68 y o  female returning for follow-up of occipital headaches secondary to occipital neuralgia more so on the right than the left  Today, the patient states that she only has pain on the right side  She denies any visual or auditory symptoms with the headaches  She denies any focal sensory or motor deficits with the headaches  She denies any nausea, fever, or chills  She denies any aura  The patient has had very favorable results to occipital nerve blocks in the past   She is currently taking verapamil 40 mg in the morning and 80 mg at bedtime, and gabapentin 600 mg q h s     This gives her approximately 50% relief in she denies any side effects from the medications  The patient rates her pain a 3/10 on the pain is worse in the evening  The pain is intermittent and described as dull, aching, pressure-like, and shooting  The pain is worse with pressure to the right occipital area  The pain is alleviated somewhat with relaxation  I have personally reviewed and/or updated the patient's past medical history, past surgical history, family history, social history, allergies, and vital signs today  Other than as stated above, the patient denies any interval changes in medications, medical condition, mental condition, symptoms, or allergies since the last office visit  Review of Systems:    Review of Systems   Constitutional: Negative for fever and unexpected weight change  HENT: Negative for trouble swallowing  Eyes: Negative for visual disturbance  Respiratory: Negative for shortness of breath and wheezing  Cardiovascular: Negative for chest pain and palpitations  Gastrointestinal: Negative for constipation, diarrhea, nausea and vomiting  Endocrine: Negative for cold intolerance, heat intolerance and polydipsia  Genitourinary: Negative for difficulty urinating and frequency  Musculoskeletal: Negative for arthralgias, gait problem, joint swelling and myalgias  Skin: Negative for rash  Neurological: Negative for dizziness, seizures, syncope, weakness and headaches  Hematological: Does not bruise/bleed easily  Psychiatric/Behavioral: Negative for dysphoric mood  All other systems reviewed and are negative  Past Medical History:   Diagnosis Date    Abnormal mammogram of right breast 06/29/2017    Acute medial meniscal tear, left, initial encounter 06/29/2017    Arthritis     Generalized anxiety disorder 08/31/2017    GERD (gastroesophageal reflux disease)     Herpes zoster 09/26/2016    Hyperlipidemia     Hypertension     Hypokalemia     Left medial knee pain     Localized osteoarthritis of left knee 03/15/2016    Post-traumatic headache 09/19/2017    Restless leg syndrome        Past Surgical History:   Procedure Laterality Date    ANKLE FRACTURE SURGERY Right     ARTHROSCOPY KNEE Left 5/26/2016    Procedure: ARTHROSCOPY KNEE, PARTILA MEDIAL MENISECTOMY;  Surgeon: oJse Black MD;  [facility];  Service:    Dwight Sanchez CHOLECYSTECTOMY      HYSTERECTOMY      SQUAMOUS CELL CARCINOMA EXCISION      forehead       Family History   Problem Relation Age of Onset    Stroke Mother     Stroke Father     Hypertension Other     Diabetes Other     Cerebral aneurysm Sister     Uterine cancer Sister        Social History     Occupational History    Not on file       Social History Main Topics    Smoking status: Never Smoker    Smokeless tobacco: Never Used    Alcohol use Yes      Comment: rare    Drug use: No    Sexual activity: Not on file         Current Outpatient Prescriptions:     amLODIPine (NORVASC) 10 mg tablet, Take 0 5 tablets (5 mg total) by mouth daily, Disp: 90 tablet, Rfl: 1    aspirin 81 MG tablet, Take 81 mg by mouth daily  , Disp: , Rfl:     atorvastatin (LIPITOR) 10 mg tablet, Take 1 tablet (10 mg total) by mouth daily, Disp: 90 tablet, Rfl: 1    fluorouracil (EFUDEX) 5 % cream, Apply 1 application topically daily, Disp: , Rfl: 1    gabapentin (NEURONTIN) 600 MG tablet, Take 1 tablet (600 mg total) by mouth daily, Disp: 90 tablet, Rfl: 1    latanoprost (XALATAN) 0 005 % ophthalmic solution, Administer 1 drop to both eyes daily at bedtime, Disp: , Rfl: 6    losartan (COZAAR) 50 mg tablet, Take 1 tablet (50 mg total) by mouth daily Losartan Potassium 50 MG Oral Tablet Take 1 tablet daily  Quantity: 90;  Refills: 1    [facility];  Started 27-June-2012 Active, Disp: 90 tablet, Rfl: 1    omeprazole (PriLOSEC) 20 mg delayed release capsule, Take 20 mg by mouth daily  , Disp: , Rfl:     potassium chloride (K-DUR,KLOR-CON) 10 mEq tablet, , Disp: , Rfl:     triamterene-hydrochlorothiazide (DYAZIDE) 37 5-25 mg per capsule, Take 1 capsule by mouth daily, Disp: , Rfl: 0    verapamil (CALAN) 40 mg tablet, Take 1 tab qAM, 2 tabs qPM and 1 tab qHS  , Disp: 360 tablet, Rfl: 1    potassium chloride (K-DUR,KLOR-CON) 10 mEq tablet, Take 1 tablet (10 mEq total) by mouth 2 (two) times a day for 90 days, Disp: 180 tablet, Rfl: 2    Allergies   Allergen Reactions    Penicillins Rash     itching       Physical Exam:    /71   Pulse 68   Temp 98 2 °F (36 8 °C)   Wt 94 8 kg (209 lb)   BMI 37 14 kg/m²     Constitutional: normal, well developed, well nourished, alert, in no distress and non-toxic and no overt pain behavior    Eyes: anicteric  HEENT: grossly intact  Neck: supple, symmetric, trachea midline and no masses   Pulmonary:even and unlabored  Cardiovascular:No edema or pitting edema present  Skin:Normal without rashes or lesions and well hydrated  Psychiatric:Mood and affect appropriate  Neurologic:Cranial Nerves II-XII grossly intact  Musculoskeletal:normal gait  Tenderness to palpation over the right occipital groove  Bilateral upper and lower extremity strength 5/5 in all muscle groups  Imaging  Imaging reviewed  No orders to display       No orders of the defined types were placed in this encounter

## 2018-08-07 DIAGNOSIS — K21.9 GASTROESOPHAGEAL REFLUX DISEASE WITHOUT ESOPHAGITIS: Primary | ICD-10-CM

## 2018-08-07 DIAGNOSIS — E78.5 HYPERLIPIDEMIA, UNSPECIFIED HYPERLIPIDEMIA TYPE: ICD-10-CM

## 2018-08-08 RX ORDER — ATORVASTATIN CALCIUM 10 MG/1
10 TABLET, FILM COATED ORAL DAILY
Qty: 90 TABLET | Refills: 1 | Status: SHIPPED | OUTPATIENT
Start: 2018-08-08 | End: 2019-01-23 | Stop reason: SDUPTHER

## 2018-08-08 RX ORDER — OMEPRAZOLE 20 MG/1
20 CAPSULE, DELAYED RELEASE ORAL DAILY
Qty: 90 CAPSULE | Refills: 1 | Status: SHIPPED | OUTPATIENT
Start: 2018-08-08 | End: 2018-10-29 | Stop reason: SDUPTHER

## 2018-08-10 ENCOUNTER — APPOINTMENT (OUTPATIENT)
Dept: LAB | Facility: CLINIC | Age: 73
End: 2018-08-10
Payer: MEDICARE

## 2018-08-10 ENCOUNTER — TRANSCRIBE ORDERS (OUTPATIENT)
Dept: LAB | Facility: CLINIC | Age: 73
End: 2018-08-10

## 2018-08-10 DIAGNOSIS — E11.9 TYPE 2 DIABETES MELLITUS WITHOUT COMPLICATION, WITHOUT LONG-TERM CURRENT USE OF INSULIN (HCC): ICD-10-CM

## 2018-08-10 DIAGNOSIS — Z00.00 HEALTHCARE MAINTENANCE: ICD-10-CM

## 2018-08-10 DIAGNOSIS — E78.49 OTHER HYPERLIPIDEMIA: ICD-10-CM

## 2018-08-10 DIAGNOSIS — M54.81 BILATERAL OCCIPITAL NEURALGIA: ICD-10-CM

## 2018-08-10 DIAGNOSIS — G44.85 STABBING HEADACHE: ICD-10-CM

## 2018-08-10 DIAGNOSIS — I10 BENIGN ESSENTIAL HYPERTENSION: ICD-10-CM

## 2018-08-10 LAB
ALBUMIN SERPL BCP-MCNC: 3.8 G/DL (ref 3.5–5)
ALP SERPL-CCNC: 98 U/L (ref 46–116)
ALT SERPL W P-5'-P-CCNC: 33 U/L (ref 12–78)
ANION GAP SERPL CALCULATED.3IONS-SCNC: 9 MMOL/L (ref 4–13)
AST SERPL W P-5'-P-CCNC: 20 U/L (ref 5–45)
BASOPHILS # BLD AUTO: 0.05 THOUSANDS/ΜL (ref 0–0.1)
BASOPHILS NFR BLD AUTO: 1 % (ref 0–1)
BILIRUB SERPL-MCNC: 0.4 MG/DL (ref 0.2–1)
BUN SERPL-MCNC: 20 MG/DL (ref 5–25)
CALCIUM SERPL-MCNC: 9.6 MG/DL (ref 8.3–10.1)
CHLORIDE SERPL-SCNC: 105 MMOL/L (ref 100–108)
CHOLEST SERPL-MCNC: 148 MG/DL (ref 50–200)
CO2 SERPL-SCNC: 30 MMOL/L (ref 21–32)
CREAT SERPL-MCNC: 1.03 MG/DL (ref 0.6–1.3)
EOSINOPHIL # BLD AUTO: 0.14 THOUSAND/ΜL (ref 0–0.61)
EOSINOPHIL NFR BLD AUTO: 2 % (ref 0–6)
ERYTHROCYTE [DISTWIDTH] IN BLOOD BY AUTOMATED COUNT: 13.6 % (ref 11.6–15.1)
EST. AVERAGE GLUCOSE BLD GHB EST-MCNC: 148 MG/DL
GFR SERPL CREATININE-BSD FRML MDRD: 54 ML/MIN/1.73SQ M
GLUCOSE P FAST SERPL-MCNC: 122 MG/DL (ref 65–99)
HBA1C MFR BLD: 6.8 % (ref 4.2–6.3)
HCT VFR BLD AUTO: 40.2 % (ref 34.8–46.1)
HCV AB SER QL: NORMAL
HDLC SERPL-MCNC: 60 MG/DL (ref 40–60)
HGB BLD-MCNC: 13.2 G/DL (ref 11.5–15.4)
IMM GRANULOCYTES # BLD AUTO: 0.05 THOUSAND/UL (ref 0–0.2)
IMM GRANULOCYTES NFR BLD AUTO: 1 % (ref 0–2)
LDLC SERPL CALC-MCNC: 66 MG/DL (ref 0–100)
LYMPHOCYTES # BLD AUTO: 1.85 THOUSANDS/ΜL (ref 0.6–4.47)
LYMPHOCYTES NFR BLD AUTO: 27 % (ref 14–44)
MCH RBC QN AUTO: 30.4 PG (ref 26.8–34.3)
MCHC RBC AUTO-ENTMCNC: 32.8 G/DL (ref 31.4–37.4)
MCV RBC AUTO: 93 FL (ref 82–98)
MONOCYTES # BLD AUTO: 0.68 THOUSAND/ΜL (ref 0.17–1.22)
MONOCYTES NFR BLD AUTO: 10 % (ref 4–12)
NEUTROPHILS # BLD AUTO: 4.06 THOUSANDS/ΜL (ref 1.85–7.62)
NEUTS SEG NFR BLD AUTO: 59 % (ref 43–75)
NONHDLC SERPL-MCNC: 88 MG/DL
NRBC BLD AUTO-RTO: 0 /100 WBCS
PLATELET # BLD AUTO: 329 THOUSANDS/UL (ref 149–390)
PMV BLD AUTO: 9.5 FL (ref 8.9–12.7)
POTASSIUM SERPL-SCNC: 4 MMOL/L (ref 3.5–5.3)
PROT SERPL-MCNC: 7.5 G/DL (ref 6.4–8.2)
RBC # BLD AUTO: 4.34 MILLION/UL (ref 3.81–5.12)
SODIUM SERPL-SCNC: 144 MMOL/L (ref 136–145)
TRIGL SERPL-MCNC: 112 MG/DL
WBC # BLD AUTO: 6.83 THOUSAND/UL (ref 4.31–10.16)

## 2018-08-10 PROCEDURE — 86803 HEPATITIS C AB TEST: CPT

## 2018-08-10 PROCEDURE — 83036 HEMOGLOBIN GLYCOSYLATED A1C: CPT

## 2018-08-10 PROCEDURE — 80061 LIPID PANEL: CPT

## 2018-08-10 PROCEDURE — 36415 COLL VENOUS BLD VENIPUNCTURE: CPT

## 2018-08-10 PROCEDURE — 85025 COMPLETE CBC W/AUTO DIFF WBC: CPT

## 2018-08-10 PROCEDURE — 80053 COMPREHEN METABOLIC PANEL: CPT

## 2018-08-15 NOTE — ASSESSMENT & PLAN NOTE
Diabetes type 2 -   Lab Results   Component Value Date    HGBA1C 6 8 (H) 08/10/2018      Patient is controlled  Medication changes include none  Eye exam UTD - yes   Foot exam UTD - yes  ACEi - yes  Statin - yes  Discussed diet and exercise as part of the diabetic lifestyle

## 2018-08-15 NOTE — PROGRESS NOTES
Johny Nieves 1945 female MRN: [id number]    Family Medicine Follow-up Visit    ASSESSMENT/PLAN  Type 2 diabetes mellitus without complication ([address] )  Diabetes type 2 -   Lab Results   Component Value Date    HGBA1C 6 8 (H) 08/10/2018      Patient is controlled  Medication changes include none  Eye exam UTD - yes   Foot exam UTD - yes  ACEi - yes  Statin - yes  Discussed diet and exercise as part of the diabetic lifestyle  Hyperlipidemia  Hyperlipidemia - 68 y  o female being evaluated for HLD  No myalgias  Continue medication  Last LDL   Lab Results   Component Value Date    LDLCALC 66 08/10/2018     ASCVD risk of      Goal of being on a low dose statin  is at goal       Healthcare maintenance  Colonoscopy up to date - due 2022  DM - eye and foot exam up to date  Needs DXA - reordered  Sees [facility]  Benign essential hypertension    Hypertension- patients hypertension is controlled today with a Blood pressure of Blood Pressure: 116/80     on current medications  Continue current medications  Discussed DASH diet and exercise  Stop the amlodipine at this time and let me know if it is consistently >140/90    Peripheral edema  Stop amlodipine  Echo was ok  No concern for CHF  Most likely from CCB or just venous insuffiency   Overall much improved  Screening for osteoporosis  Ordered dxa    Generalized anxiety disorder  Well controlled per patient report - not on medication at this time    Esophageal reflux  On PPI - may consider trial of medication in the future    Bilateral occipital neuralgia  Continue neurology follow up - this seems much improved with CCB        Future Appointments  Date Time Provider Jasen Joshua   10/19/2018 10:30 AM Camron Jacobo MD [facility]   10/24/2018 11:15 AM Ryland Robertson PA-C [facility]   12/20/2018 11:00 AM DO CHARLEY Collins [facility]          SUBJECTIVE  CC: Follow-up      HPI:  Bianca Mays Татьяна Parrish is a 68 y o  female who presents for  [facility] dermatology  Saw podiatry and got another steroid injection  Saw Kody Lockwood for feet and ankle swelling and she was anxious - did echo and changed the amlodipine  Verapamil has helped the headaches and now we are on two calcium channel blockers  Overall feeling well  Hypertension - denies CP/SOB/HA  Compliant with medications  No known episodes of hypotension  Hyperlipidemia - no cp/sob/myalgias  Compliant with medications  Review of Systems   Constitutional: Negative for activity change, chills, fever and unexpected weight change  HENT: Negative for congestion  Eyes: Negative for visual disturbance  Respiratory: Negative for cough, chest tightness, shortness of breath and wheezing  Cardiovascular: Negative for chest pain  Gastrointestinal: Negative for abdominal pain, diarrhea and nausea  Endocrine: Negative for cold intolerance and heat intolerance  Musculoskeletal: Negative for arthralgias and myalgias  Skin: Negative for color change  Neurological: Negative for dizziness  Psychiatric/Behavioral: Negative for agitation, dysphoric mood and sleep disturbance         Historical Information   The patient history was reviewed as follows:    Past Medical History:   Diagnosis Date    Abnormal mammogram of right breast 06/29/2017    Acute medial meniscal tear, left, initial encounter 06/29/2017    Arthritis     Generalized anxiety disorder 08/31/2017    GERD (gastroesophageal reflux disease)     Herpes zoster 09/26/2016    Hyperlipidemia     Hypertension     Hypokalemia     Left medial knee pain     Localized osteoarthritis of left knee 03/15/2016    Post-traumatic headache 09/19/2017    Restless leg syndrome      Past Surgical History:   Procedure Laterality Date    ANKLE FRACTURE SURGERY Right     ARTHROSCOPY KNEE Left 5/26/2016    Procedure: ARTHROSCOPY KNEE, PARTILA MEDIAL MENISECTOMY;  Surgeon: Errol Sewell MD; [facility];  Service:     CHOLECYSTECTOMY      HYSTERECTOMY      SQUAMOUS CELL CARCINOMA EXCISION      forehead     Family History   Problem Relation Age of Onset    Stroke Mother    Brandi Yañez Stroke Father     Hypertension Other     Diabetes Other     Cerebral aneurysm Sister     Uterine cancer Sister       Social History   History   Alcohol Use    Yes     Comment: rare     History   Drug Use No     History   Smoking Status    Never Smoker   Smokeless Tobacco    Never Used       Medications:     Current Outpatient Prescriptions:     amLODIPine (NORVASC) 10 mg tablet, Take 0 5 tablets (5 mg total) by mouth daily, Disp: 90 tablet, Rfl: 1    aspirin 81 MG tablet, Take 81 mg by mouth daily  , Disp: , Rfl:     atorvastatin (LIPITOR) 10 mg tablet, Take 1 tablet (10 mg total) by mouth daily, Disp: 90 tablet, Rfl: 1    fluorouracil (EFUDEX) 5 % cream, Apply 1 application topically daily, Disp: , Rfl: 1    gabapentin (NEURONTIN) 600 MG tablet, Take 1 tablet (600 mg total) by mouth daily, Disp: 90 tablet, Rfl: 1    latanoprost (XALATAN) 0 005 % ophthalmic solution, Administer 1 drop to both eyes daily at bedtime, Disp: , Rfl: 6    losartan (COZAAR) 50 mg tablet, Take 1 tablet (50 mg total) by mouth daily Losartan Potassium 50 MG Oral Tablet Take 1 tablet daily  Quantity: 90;  Refills: 1    Manjinder Dawkins DO;  Started 27-June-2012 Active, Disp: 90 tablet, Rfl: 1    omeprazole (PriLOSEC) 20 mg delayed release capsule, Take 1 capsule (20 mg total) by mouth daily, Disp: 90 capsule, Rfl: 1    potassium chloride (K-DUR,KLOR-CON) 10 mEq tablet, , Disp: , Rfl:     triamterene-hydrochlorothiazide (DYAZIDE) 37 5-25 mg per capsule, Take 1 capsule by mouth daily, Disp: , Rfl: 0    verapamil (CALAN) 40 mg tablet, Take 1 tab qAM, 2 tabs qPM and 1 tab qHS  , Disp: 360 tablet, Rfl: 1    potassium chloride (K-DUR,KLOR-CON) 10 mEq tablet, Take 1 tablet (10 mEq total) by mouth 2 (two) times a day for 90 days, Disp: 180 tablet, Rfl: 2  Allergies   Allergen Reactions    Penicillins Rash     itching       OBJECTIVE    Vitals:   Vitals:    08/16/18 1011   BP: 116/80   BP Location: Left arm   Patient Position: Sitting   Cuff Size: Large   Pulse: 68   Resp: 14   Temp: 99 °F (37 2 °C)   TempSrc: Tympanic   Weight: 95 kg (209 lb 6 4 oz)   Height: 5' 2 1" (1 577 m)           Physical Exam   Constitutional: She is oriented to person, place, and time  She appears well-developed and well-nourished  HENT:   Head: Normocephalic and atraumatic  Cardiovascular: Normal rate, regular rhythm and normal heart sounds  Pulmonary/Chest: Effort normal and breath sounds normal    Abdominal: Soft  Bowel sounds are normal    Musculoskeletal: Normal range of motion  Tr ankle edema   Neurological: She is alert and oriented to person, place, and time  Skin: Skin is warm and dry  Psychiatric: She has a normal mood and affect   Her behavior is normal  Judgment normal

## 2018-08-16 ENCOUNTER — OFFICE VISIT (OUTPATIENT)
Dept: FAMILY MEDICINE CLINIC | Facility: CLINIC | Age: 73
End: 2018-08-16
Payer: MEDICARE

## 2018-08-16 VITALS
RESPIRATION RATE: 14 BRPM | TEMPERATURE: 99 F | BODY MASS INDEX: 38.53 KG/M2 | HEART RATE: 68 BPM | HEIGHT: 62 IN | WEIGHT: 209.4 LBS | DIASTOLIC BLOOD PRESSURE: 80 MMHG | SYSTOLIC BLOOD PRESSURE: 116 MMHG

## 2018-08-16 DIAGNOSIS — Z13.820 SCREENING FOR OSTEOPOROSIS: ICD-10-CM

## 2018-08-16 DIAGNOSIS — M54.81 BILATERAL OCCIPITAL NEURALGIA: ICD-10-CM

## 2018-08-16 DIAGNOSIS — K21.9 GASTROESOPHAGEAL REFLUX DISEASE, ESOPHAGITIS PRESENCE NOT SPECIFIED: ICD-10-CM

## 2018-08-16 DIAGNOSIS — I10 BENIGN ESSENTIAL HYPERTENSION: ICD-10-CM

## 2018-08-16 DIAGNOSIS — E78.5 HYPERLIPIDEMIA, UNSPECIFIED HYPERLIPIDEMIA TYPE: ICD-10-CM

## 2018-08-16 DIAGNOSIS — R60.9 PERIPHERAL EDEMA: ICD-10-CM

## 2018-08-16 DIAGNOSIS — E11.9 TYPE 2 DIABETES MELLITUS WITHOUT COMPLICATION, WITHOUT LONG-TERM CURRENT USE OF INSULIN (HCC): Primary | ICD-10-CM

## 2018-08-16 DIAGNOSIS — F41.1 GENERALIZED ANXIETY DISORDER: ICD-10-CM

## 2018-08-16 DIAGNOSIS — Z00.00 HEALTHCARE MAINTENANCE: ICD-10-CM

## 2018-08-16 PROBLEM — G44.201 ACUTE INTRACTABLE TENSION-TYPE HEADACHE: Status: RESOLVED | Noted: 2017-08-03 | Resolved: 2018-08-16

## 2018-08-16 PROCEDURE — 99214 OFFICE O/P EST MOD 30 MIN: CPT | Performed by: FAMILY MEDICINE

## 2018-08-16 NOTE — ASSESSMENT & PLAN NOTE
Hyperlipidemia - 68 y  o female being evaluated for HLD  No myalgias  Continue medication  Last LDL   Lab Results   Component Value Date    LDLCALC 66 08/10/2018     ASCVD risk of      Goal of being on a low dose statin    is at goal

## 2018-08-16 NOTE — ASSESSMENT & PLAN NOTE
Stop amlodipine  Echo was ok  No concern for CHF  Most likely from CCB or just venous insuffiency   Overall much improved

## 2018-08-16 NOTE — ASSESSMENT & PLAN NOTE
Hypertension- patients hypertension is controlled today with a Blood pressure of Blood Pressure: 116/80     on current medications  Continue current medications  Discussed DASH diet and exercise    Stop the amlodipine at this time and let me know if it is consistently >140/90

## 2018-08-16 NOTE — ASSESSMENT & PLAN NOTE
Colonoscopy up to date - due 2022  DM - eye and foot exam up to date  Needs DXA - reordered  Sees [facility]

## 2018-09-29 RX ORDER — TRIAMTERENE AND HYDROCHLOROTHIAZIDE 37.5; 25 MG/1; MG/1
CAPSULE ORAL
Qty: 90 CAPSULE | Refills: 0 | Status: CANCELLED | OUTPATIENT
Start: 2018-09-29

## 2018-10-17 DIAGNOSIS — I10 ESSENTIAL HYPERTENSION: Primary | ICD-10-CM

## 2018-10-17 RX ORDER — TRIAMTERENE AND HYDROCHLOROTHIAZIDE 37.5; 25 MG/1; MG/1
1 CAPSULE ORAL DAILY
Qty: 90 CAPSULE | Refills: 1 | Status: SHIPPED | OUTPATIENT
Start: 2018-10-17 | End: 2019-04-11 | Stop reason: SDUPTHER

## 2018-10-19 ENCOUNTER — OFFICE VISIT (OUTPATIENT)
Dept: SLEEP CENTER | Facility: CLINIC | Age: 73
End: 2018-10-19
Payer: MEDICARE

## 2018-10-19 VITALS
WEIGHT: 210 LBS | DIASTOLIC BLOOD PRESSURE: 70 MMHG | HEIGHT: 62 IN | SYSTOLIC BLOOD PRESSURE: 120 MMHG | BODY MASS INDEX: 38.64 KG/M2 | HEART RATE: 64 BPM

## 2018-10-19 DIAGNOSIS — R68.2 DRY MOUTH: ICD-10-CM

## 2018-10-19 DIAGNOSIS — G25.81 RESTLESS LEGS SYNDROME: ICD-10-CM

## 2018-10-19 DIAGNOSIS — F41.1 GENERALIZED ANXIETY DISORDER: ICD-10-CM

## 2018-10-19 DIAGNOSIS — G47.33 OBSTRUCTIVE SLEEP APNEA ON CPAP: Primary | ICD-10-CM

## 2018-10-19 DIAGNOSIS — I10 BENIGN ESSENTIAL HYPERTENSION: ICD-10-CM

## 2018-10-19 DIAGNOSIS — Z99.89 OBSTRUCTIVE SLEEP APNEA ON CPAP: Primary | ICD-10-CM

## 2018-10-19 DIAGNOSIS — E66.9 OBESITY (BMI 30-39.9): ICD-10-CM

## 2018-10-19 DIAGNOSIS — M54.81 BILATERAL OCCIPITAL NEURALGIA: ICD-10-CM

## 2018-10-19 PROCEDURE — 99214 OFFICE O/P EST MOD 30 MIN: CPT | Performed by: INTERNAL MEDICINE

## 2018-10-19 RX ORDER — GABAPENTIN 300 MG/1
300 CAPSULE ORAL 2 TIMES DAILY
Qty: 180 CAPSULE | Refills: 0 | Status: SHIPPED | OUTPATIENT
Start: 2018-10-19 | End: 2019-02-11 | Stop reason: SDUPTHER

## 2018-10-19 NOTE — PROGRESS NOTES
Review of Systems      Genitourinary none   Cardiology ankle/leg swelling   Gastrointestinal none   Neurology frequent headaches and balance problems   Constitutional none   Integumentary none   Psychiatry none   Musculoskeletal joint pain, muscle aches and leg cramps   Pulmonary none   ENT throat clearing and ringing in ears   Endocrine none   Hematological none impaired

## 2018-10-29 DIAGNOSIS — I10 BENIGN ESSENTIAL HYPERTENSION: ICD-10-CM

## 2018-10-29 DIAGNOSIS — K21.9 GASTROESOPHAGEAL REFLUX DISEASE WITHOUT ESOPHAGITIS: ICD-10-CM

## 2018-10-30 RX ORDER — OMEPRAZOLE 20 MG/1
20 CAPSULE, DELAYED RELEASE ORAL DAILY
Qty: 90 CAPSULE | Refills: 1 | Status: SHIPPED | OUTPATIENT
Start: 2018-10-30 | End: 2019-07-25 | Stop reason: SDUPTHER

## 2018-10-30 RX ORDER — POTASSIUM CHLORIDE 750 MG/1
10 TABLET, EXTENDED RELEASE ORAL 2 TIMES DAILY
Qty: 180 TABLET | Refills: 1 | Status: SHIPPED | OUTPATIENT
Start: 2018-10-30 | End: 2019-03-07

## 2018-11-15 ENCOUNTER — OFFICE VISIT (OUTPATIENT)
Dept: NEUROLOGY | Facility: CLINIC | Age: 73
End: 2018-11-15
Payer: MEDICARE

## 2018-11-15 DIAGNOSIS — M54.81 BILATERAL OCCIPITAL NEURALGIA: ICD-10-CM

## 2018-11-15 DIAGNOSIS — G25.81 RESTLESS LEGS SYNDROME: ICD-10-CM

## 2018-11-15 PROCEDURE — 99214 OFFICE O/P EST MOD 30 MIN: CPT | Performed by: PHYSICIAN ASSISTANT
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
## 2018-11-16 ENCOUNTER — TELEPHONE (OUTPATIENT)
Dept: SLEEP CENTER | Facility: CLINIC | Age: 73
End: 2018-11-16

## 2018-11-16 NOTE — TELEPHONE ENCOUNTER
Spoke to PT about having a dry mouth  She does not have a humidifier with her machine  She never needed one before  She is waking up with terrible dry mouth  I suggested a humidifier in her room  PT is eligible for a new CPAP in 4/2019  PT's follow up in 10/2018 is cutting it close to the 6 month cut off for FTF notes  I suggested making a follow up after the new year

## 2018-11-18 VITALS — DIASTOLIC BLOOD PRESSURE: 80 MMHG | SYSTOLIC BLOOD PRESSURE: 130 MMHG

## 2018-12-18 ENCOUNTER — APPOINTMENT (OUTPATIENT)
Dept: LAB | Facility: CLINIC | Age: 73
End: 2018-12-18
Payer: MEDICARE

## 2018-12-18 DIAGNOSIS — E78.5 HYPERLIPIDEMIA, UNSPECIFIED HYPERLIPIDEMIA TYPE: ICD-10-CM

## 2018-12-18 DIAGNOSIS — E11.9 TYPE 2 DIABETES MELLITUS WITHOUT COMPLICATION, WITHOUT LONG-TERM CURRENT USE OF INSULIN (HCC): ICD-10-CM

## 2018-12-18 LAB
ALBUMIN SERPL BCP-MCNC: 4 G/DL (ref 3.5–5)
ALP SERPL-CCNC: 88 U/L (ref 46–116)
ALT SERPL W P-5'-P-CCNC: 44 U/L (ref 12–78)
ANION GAP SERPL CALCULATED.3IONS-SCNC: 9 MMOL/L (ref 4–13)
AST SERPL W P-5'-P-CCNC: 28 U/L (ref 5–45)
BASOPHILS # BLD AUTO: 0.04 THOUSANDS/ΜL (ref 0–0.1)
BASOPHILS NFR BLD AUTO: 1 % (ref 0–1)
BILIRUB SERPL-MCNC: 0.6 MG/DL (ref 0.2–1)
BUN SERPL-MCNC: 19 MG/DL (ref 5–25)
CALCIUM SERPL-MCNC: 9.5 MG/DL (ref 8.3–10.1)
CHLORIDE SERPL-SCNC: 102 MMOL/L (ref 100–108)
CHOLEST SERPL-MCNC: 139 MG/DL (ref 50–200)
CO2 SERPL-SCNC: 30 MMOL/L (ref 21–32)
CREAT SERPL-MCNC: 1.04 MG/DL (ref 0.6–1.3)
CREAT UR-MCNC: 242 MG/DL
EOSINOPHIL # BLD AUTO: 0.2 THOUSAND/ΜL (ref 0–0.61)
EOSINOPHIL NFR BLD AUTO: 3 % (ref 0–6)
ERYTHROCYTE [DISTWIDTH] IN BLOOD BY AUTOMATED COUNT: 13.5 % (ref 11.6–15.1)
EST. AVERAGE GLUCOSE BLD GHB EST-MCNC: 160 MG/DL
GFR SERPL CREATININE-BSD FRML MDRD: 54 ML/MIN/1.73SQ M
GLUCOSE P FAST SERPL-MCNC: 135 MG/DL (ref 65–99)
HBA1C MFR BLD: 7.2 % (ref 4.2–6.3)
HCT VFR BLD AUTO: 42.4 % (ref 34.8–46.1)
HDLC SERPL-MCNC: 55 MG/DL (ref 40–60)
HGB BLD-MCNC: 13.9 G/DL (ref 11.5–15.4)
IMM GRANULOCYTES # BLD AUTO: 0.03 THOUSAND/UL (ref 0–0.2)
IMM GRANULOCYTES NFR BLD AUTO: 0 % (ref 0–2)
LDLC SERPL CALC-MCNC: 53 MG/DL (ref 0–100)
LYMPHOCYTES # BLD AUTO: 2.12 THOUSANDS/ΜL (ref 0.6–4.47)
LYMPHOCYTES NFR BLD AUTO: 30 % (ref 14–44)
MCH RBC QN AUTO: 30.3 PG (ref 26.8–34.3)
MCHC RBC AUTO-ENTMCNC: 32.8 G/DL (ref 31.4–37.4)
MCV RBC AUTO: 92 FL (ref 82–98)
MICROALBUMIN UR-MCNC: 51.7 MG/L (ref 0–20)
MICROALBUMIN/CREAT 24H UR: 21 MG/G CREATININE (ref 0–30)
MONOCYTES # BLD AUTO: 0.67 THOUSAND/ΜL (ref 0.17–1.22)
MONOCYTES NFR BLD AUTO: 9 % (ref 4–12)
NEUTROPHILS # BLD AUTO: 4.1 THOUSANDS/ΜL (ref 1.85–7.62)
NEUTS SEG NFR BLD AUTO: 57 % (ref 43–75)
NONHDLC SERPL-MCNC: 84 MG/DL
NRBC BLD AUTO-RTO: 0 /100 WBCS
PLATELET # BLD AUTO: 340 THOUSANDS/UL (ref 149–390)
PMV BLD AUTO: 9.9 FL (ref 8.9–12.7)
POTASSIUM SERPL-SCNC: 4 MMOL/L (ref 3.5–5.3)
PROT SERPL-MCNC: 7.7 G/DL (ref 6.4–8.2)
RBC # BLD AUTO: 4.59 MILLION/UL (ref 3.81–5.12)
SODIUM SERPL-SCNC: 141 MMOL/L (ref 136–145)
TRIGL SERPL-MCNC: 157 MG/DL
WBC # BLD AUTO: 7.16 THOUSAND/UL (ref 4.31–10.16)

## 2018-12-18 PROCEDURE — 83036 HEMOGLOBIN GLYCOSYLATED A1C: CPT

## 2018-12-18 PROCEDURE — 36415 COLL VENOUS BLD VENIPUNCTURE: CPT

## 2018-12-18 PROCEDURE — 85025 COMPLETE CBC W/AUTO DIFF WBC: CPT

## 2018-12-18 PROCEDURE — 80061 LIPID PANEL: CPT

## 2018-12-18 PROCEDURE — 82043 UR ALBUMIN QUANTITATIVE: CPT

## 2018-12-18 PROCEDURE — 82570 ASSAY OF URINE CREATININE: CPT

## 2018-12-18 PROCEDURE — 80053 COMPREHEN METABOLIC PANEL: CPT

## 2018-12-20 ENCOUNTER — OFFICE VISIT (OUTPATIENT)
Dept: FAMILY MEDICINE CLINIC | Facility: CLINIC | Age: 73
End: 2018-12-20
Payer: MEDICARE

## 2018-12-20 VITALS
HEART RATE: 78 BPM | HEIGHT: 62 IN | TEMPERATURE: 99.3 F | WEIGHT: 209.4 LBS | RESPIRATION RATE: 16 BRPM | BODY MASS INDEX: 38.53 KG/M2 | DIASTOLIC BLOOD PRESSURE: 70 MMHG | SYSTOLIC BLOOD PRESSURE: 120 MMHG

## 2018-12-20 DIAGNOSIS — E78.5 HYPERLIPIDEMIA, UNSPECIFIED HYPERLIPIDEMIA TYPE: ICD-10-CM

## 2018-12-20 DIAGNOSIS — E11.9 TYPE 2 DIABETES MELLITUS WITHOUT COMPLICATION, WITHOUT LONG-TERM CURRENT USE OF INSULIN (HCC): ICD-10-CM

## 2018-12-20 DIAGNOSIS — M25.551 PAIN OF BOTH HIP JOINTS: ICD-10-CM

## 2018-12-20 DIAGNOSIS — Z00.00 HEALTHCARE MAINTENANCE: ICD-10-CM

## 2018-12-20 DIAGNOSIS — R53.82 CHRONIC FATIGUE: Primary | ICD-10-CM

## 2018-12-20 DIAGNOSIS — M25.552 PAIN OF BOTH HIP JOINTS: ICD-10-CM

## 2018-12-20 PROBLEM — R68.89 FLU-LIKE SYMPTOMS: Status: RESOLVED | Noted: 2018-04-30 | Resolved: 2018-12-20

## 2018-12-20 PROBLEM — H01.001 BLEPHARITIS OF RIGHT UPPER EYELID: Status: RESOLVED | Noted: 2018-04-18 | Resolved: 2018-12-20

## 2018-12-20 PROCEDURE — 99214 OFFICE O/P EST MOD 30 MIN: CPT | Performed by: FAMILY MEDICINE

## 2018-12-20 RX ORDER — BIMATOPROST 0.01 %
DROPS OPHTHALMIC (EYE)
Refills: 3 | COMMUNITY
Start: 2018-12-13 | End: 2019-12-05

## 2018-12-20 NOTE — ASSESSMENT & PLAN NOTE
Hyperlipidemia - 68 y  o female being evaluated for HLD  No myalgias  Continue medication    Last LDL   Lab Results   Component Value Date    LDLCALC 53 12/18/2018   on statin due to dm2 anyway

## 2018-12-20 NOTE — ASSESSMENT & PLAN NOTE
Unclear etiology  Differential includes - thyroid (does have weight gain, hair loss, dry skin, no constipation)  poor sleep - potentially caused by   roberta - Depression/anxiety     Check TSH (order entered to add to blood drawn 2 days ago - will see if this works) Work on humidified air in cpap    Return in one month and will discuss mood/anxiety

## 2018-12-20 NOTE — PROGRESS NOTES
Ferny Edmond 1945 female MRN: [id number]    Family Medicine Follow-up Visit    ASSESSMENT/PLAN  Chronic fatigue  Unclear etiology  Differential includes - thyroid (does have weight gain, hair loss, dry skin, no constipation)  poor sleep - potentially caused by   rober[place] - Depression/anxiety     Check TSH (order entered to add to blood drawn 2 days ago - will see if this works) Work on humidified air in cpap  Return in one month and will discuss mood/anxiety      Type 2 diabetes mellitus without complication ([facility] )  Diabetes type 2 -   Lab Results   Component Value Date    HGBA1C 7 2 (H) 12/18/2018      Patient is controlled  Medication changes include none  Eye exam UTD - yes   Foot exam UTD - yes  ACEi - yes  Statin - yes  Discussed diet and exercise as part of the diabetic lifestyle  Pain of both hip joints  Most likely osteoarthritis - try tylenol as needed and let me know if worsening    Hyperlipidemia  Hyperlipidemia - 68 y  o female being evaluated for HLD  No myalgias  Continue medication  Last LDL   Lab Results   Component Value Date    LDLCALC 53 12/18/2018   on statin due to dm2 anyway    Healthcare maintenance  dxa - will have patient schedule at next visit  awv due      Future Appointments  Date Time Provider Jasen Joshua   2/28/2019 10:10 AM DO CHARLEY Minaya [facility]   5/15/2019 10:30 AM Rand Dos Santos PA-C NEURO [facility]   10/21/2019 1:00 PM Dom Powell MD [facility]          SUBJECTIVE  CC: Follow-up (4 month )      HPI:  Ferny Edmond is a 68 y o  female who presents for  Complains of fatigue  Falls asleep easily  Has trouble with sleep maintenance  Sleeps for around4 hours each night  She does wake up for various reasons but most recently because her cpap makes her airway dry  Discussed not using screens at night    She does admit to waking up and ruminating about things, or as she puts it, "she gets some of her best thinking done at night "  She states that she does not feel depressed but knows that she often gets anxious  Also complains of pain in her hips  Review of Systems    Historical Information   The patient history was reviewed as follows:    Past Medical History:   Diagnosis Date    Abnormal mammogram of right breast 06/29/2017    Acute medial meniscal tear, left, initial encounter 06/29/2017    Arthritis     Generalized anxiety disorder 08/31/2017    GERD (gastroesophageal reflux disease)     Herpes zoster 09/26/2016    Hyperlipidemia     Hypertension     Hypokalemia     Left medial knee pain     Localized osteoarthritis of left knee 03/15/2016    Post-traumatic headache 09/19/2017    Restless leg syndrome      Past Surgical History:   Procedure Laterality Date    ANKLE FRACTURE SURGERY Right     ARTHROSCOPY KNEE Left 5/26/2016    Procedure: ARTHROSCOPY KNEE, PARTILA MEDIAL MENISECTOMY;  Surgeon: Radha Naqvi MD;  Location: [facility] MAIN OR;  Service:    [facility] CHOLECYSTECTOMY      HYSTERECTOMY      SQUAMOUS CELL CARCINOMA EXCISION      forehead     Family History   Problem Relation Age of Onset    Stroke Mother     Stroke Father     Hypertension Other     Diabetes Other     Cerebral aneurysm Sister     Uterine cancer Sister       Social History   History   Alcohol Use    Yes     Comment: rare     History   Drug Use No     History   Smoking Status    Never Smoker   Smokeless Tobacco    Never Used       Medications:     Current Outpatient Prescriptions:     aspirin 81 MG tablet, Take 81 mg by mouth daily  , Disp: , Rfl:     atorvastatin (LIPITOR) 10 mg tablet, Take 1 tablet (10 mg total) by mouth daily, Disp: 90 tablet, Rfl: 1    fluorouracil (EFUDEX) 5 % cream, Apply 1 application topically daily, Disp: , Rfl: 1    gabapentin (NEURONTIN) 300 mg capsule, Take 1 capsule (300 mg total) by mouth 2 (two) times a day Take 1 half an hour before bedtime, Disp: 180 capsule, Rfl: 0    losartan (COZAAR) 50 mg tablet, Take 1 tablet (50 mg total) by mouth daily Losartan Potassium 50 MG Oral Tablet Take 1 tablet daily  Quantity: 90;  Refills: 1    Andrea Dawkins DO;  Started 27-June-2012 Active, Disp: 90 tablet, Rfl: 1    omeprazole (PriLOSEC) 20 mg delayed release capsule, Take 1 capsule (20 mg total) by mouth daily, Disp: 90 capsule, Rfl: 1    potassium chloride (K-DUR,KLOR-CON) 10 mEq tablet, , Disp: , Rfl:     triamterene-hydrochlorothiazide (DYAZIDE) 37 5-25 mg per capsule, Take 1 capsule by mouth daily, Disp: 90 capsule, Rfl: 1    verapamil (CALAN) 40 mg tablet, Take 1 tab qAM, 2 tabs qPM and 1 tab qHS  , Disp: 360 tablet, Rfl: 1    amLODIPine (NORVASC) 10 mg tablet, Take 0 5 tablets (5 mg total) by mouth daily (Patient not taking: Reported on 10/19/2018 ), Disp: 90 tablet, Rfl: 1    latanoprost (XALATAN) 0 005 % ophthalmic solution, Administer 1 drop to both eyes daily at bedtime, Disp: , Rfl: 6    LUMIGAN 0 01 % ophthalmic drops, INSTILL 1 DROP INTO BOTH EYES EVERY DAY AT NIGHT, Disp: , Rfl: 3    potassium chloride (K-DUR,KLOR-CON) 10 mEq tablet, Take 1 tablet (10 mEq total) by mouth 2 (two) times a day for 90 days (Patient not taking: Reported on 12/20/2018 ), Disp: 180 tablet, Rfl: 1  Allergies   Allergen Reactions    Penicillins Rash     itching       OBJECTIVE    Vitals:   Vitals:    12/20/18 1128   BP: 120/70   Pulse: 78   Resp: 16   Temp: 99 3 °F (37 4 °C)   Weight: 95 kg (209 lb 6 4 oz)   Height: 5' 2" (1 575 m)           Physical Exam

## 2018-12-20 NOTE — ASSESSMENT & PLAN NOTE
Diabetes type 2 -   Lab Results   Component Value Date    HGBA1C 7 2 (H) 12/18/2018      Patient is controlled  Medication changes include none  Eye exam UTD - yes   Foot exam UTD - yes  ACEi - yes  Statin - yes  Discussed diet and exercise as part of the diabetic lifestyle

## 2018-12-26 DIAGNOSIS — I10 HYPERTENSION, UNSPECIFIED TYPE: ICD-10-CM

## 2018-12-27 ENCOUNTER — APPOINTMENT (OUTPATIENT)
Dept: LAB | Facility: CLINIC | Age: 73
End: 2018-12-27
Payer: MEDICARE

## 2018-12-27 ENCOUNTER — TRANSCRIBE ORDERS (OUTPATIENT)
Dept: LAB | Facility: CLINIC | Age: 73
End: 2018-12-27

## 2018-12-27 DIAGNOSIS — R53.82 CHRONIC FATIGUE: ICD-10-CM

## 2018-12-27 DIAGNOSIS — I10 HYPERTENSION, UNSPECIFIED TYPE: ICD-10-CM

## 2018-12-27 LAB — TSH SERPL DL<=0.05 MIU/L-ACNC: 1.16 UIU/ML (ref 0.36–3.74)

## 2018-12-27 PROCEDURE — 36415 COLL VENOUS BLD VENIPUNCTURE: CPT

## 2018-12-27 PROCEDURE — 84443 ASSAY THYROID STIM HORMONE: CPT

## 2018-12-27 RX ORDER — LOSARTAN POTASSIUM 50 MG/1
50 TABLET ORAL DAILY
Qty: 90 TABLET | Refills: 0 | Status: SHIPPED | OUTPATIENT
Start: 2018-12-27 | End: 2018-12-27 | Stop reason: SDUPTHER

## 2018-12-28 RX ORDER — LOSARTAN POTASSIUM 50 MG/1
50 TABLET ORAL DAILY
Qty: 90 TABLET | Refills: 1 | Status: SHIPPED | OUTPATIENT
Start: 2018-12-28 | End: 2019-07-03 | Stop reason: SDUPTHER

## 2019-01-10 NOTE — TELEPHONE ENCOUNTER
Received auto refill from [facility] on Gabapentin  Called patient to confirm medication information  She states that she has decreased the Gabapentin to 400 mg daily on her own and is doing well on that dose  Patient reports she has enough Gabapentin until 2/11/2019 when she is scheduled to see Dr Luci Uribe  Scheduled her in sleep clinic because of concerns of dry mouth and now needing a Cpap with humidification  She is asking about new machine

## 2019-01-23 DIAGNOSIS — E78.5 HYPERLIPIDEMIA, UNSPECIFIED HYPERLIPIDEMIA TYPE: ICD-10-CM

## 2019-01-24 RX ORDER — ATORVASTATIN CALCIUM 10 MG/1
10 TABLET, FILM COATED ORAL DAILY
Qty: 90 TABLET | Refills: 1 | Status: SHIPPED | OUTPATIENT
Start: 2019-01-24 | End: 2019-09-17 | Stop reason: SDUPTHER

## 2019-01-25 DIAGNOSIS — M54.81 BILATERAL OCCIPITAL NEURALGIA: ICD-10-CM

## 2019-01-25 DIAGNOSIS — G44.85 STABBING HEADACHE: ICD-10-CM

## 2019-01-25 NOTE — TELEPHONE ENCOUNTER
From: Oly Fernández  Sent: 1/25/2019 3:02 PM EST  Subject: Medication Renewal Request    Oly Fernández would like a refill of the following medications:     verapamil (CALAN) 40 mg tablet CAROLEE Das   Patient Comment: Take 160 mg x day - 90 day supply please    Preferred pharmacy: [facility]/PHARMACY #[id number] : [phone]

## 2019-01-27 RX ORDER — VERAPAMIL HYDROCHLORIDE 40 MG/1
TABLET ORAL
Qty: 360 TABLET | Refills: 1 | Status: SHIPPED | OUTPATIENT
Start: 2019-01-27 | End: 2019-07-25 | Stop reason: SDUPTHER

## 2019-02-08 ENCOUNTER — TELEPHONE (OUTPATIENT)
Dept: SLEEP CENTER | Facility: CLINIC | Age: 74
End: 2019-02-08

## 2019-02-11 ENCOUNTER — OFFICE VISIT (OUTPATIENT)
Dept: SLEEP CENTER | Facility: CLINIC | Age: 74
End: 2019-02-11
Payer: MEDICARE

## 2019-02-11 VITALS
HEIGHT: 62 IN | SYSTOLIC BLOOD PRESSURE: 126 MMHG | HEART RATE: 74 BPM | WEIGHT: 211 LBS | DIASTOLIC BLOOD PRESSURE: 72 MMHG | BODY MASS INDEX: 38.83 KG/M2

## 2019-02-11 DIAGNOSIS — G25.81 RESTLESS LEGS SYNDROME: ICD-10-CM

## 2019-02-11 DIAGNOSIS — K21.9 GASTROESOPHAGEAL REFLUX DISEASE, ESOPHAGITIS PRESENCE NOT SPECIFIED: ICD-10-CM

## 2019-02-11 DIAGNOSIS — F41.1 GENERALIZED ANXIETY DISORDER: ICD-10-CM

## 2019-02-11 DIAGNOSIS — I10 BENIGN ESSENTIAL HYPERTENSION: ICD-10-CM

## 2019-02-11 DIAGNOSIS — Z99.89 OBSTRUCTIVE SLEEP APNEA ON CPAP: Primary | ICD-10-CM

## 2019-02-11 DIAGNOSIS — G47.33 OBSTRUCTIVE SLEEP APNEA ON CPAP: Primary | ICD-10-CM

## 2019-02-11 DIAGNOSIS — R68.2 DRY MOUTH: ICD-10-CM

## 2019-02-11 DIAGNOSIS — G47.9 SLEEP DISTURBANCE: ICD-10-CM

## 2019-02-11 PROCEDURE — 99214 OFFICE O/P EST MOD 30 MIN: CPT | Performed by: INTERNAL MEDICINE

## 2019-02-11 RX ORDER — TIMOLOL MALEATE 5 MG/ML
SOLUTION/ DROPS OPHTHALMIC
Refills: 3 | COMMUNITY
Start: 2019-01-25 | End: 2020-07-17

## 2019-02-11 RX ORDER — GABAPENTIN 300 MG/1
300 CAPSULE ORAL
Qty: 90 CAPSULE | Refills: 3 | Status: SHIPPED | OUTPATIENT
Start: 2019-02-11 | End: 2019-09-09 | Stop reason: DRUGHIGH

## 2019-02-11 NOTE — PROGRESS NOTES
Follow-Up Note - [address]  68 y o  female  :1945  [id number]    CC: I saw this patient for follow-up in clinic today for her Sleep Disordered Breathing, Coexisting Sleep and Medical Problems  PFSH, Problem List, Medications & Allergies were reviewed in EMR  Interval changes: none reported  She  has a past medical history of Abnormal mammogram of right breast (2017), Acute medial meniscal tear, left, initial encounter (2017), Arthritis, Generalized anxiety disorder (2017), GERD (gastroesophageal reflux disease), Herpes zoster (2016), Hyperlipidemia, Hypertension, Hypokalemia, Left medial knee pain, Localized osteoarthritis of left knee (03/15/2016), Post-traumatic headache (2017), and Restless leg syndrome  She has a current medication list which includes the following prescription(s): aspirin, atorvastatin, fluorouracil, gabapentin, losartan, lumigan, omeprazole, potassium chloride, timolol, triamterene-hydrochlorothiazide, verapamil, amlodipine, latanoprost, and potassium chloride  ROS: Reviewed (see attached)  She has postnasal drip that is worse on awakening but needs to clear her throat frequently  She has musculoskeletal aches and pains but not disturbing sleep  She no longer has occipital neuralgia  She has anxiety and racing thoughts when she awakens at night  DATA REVIEWED: compliance data download shows  using PAP > 4 hours/night 100% of the time  HERVE (estimated) 0 2/hour at  pressure of 14cm H2O     SUBJECTIVE: Francisco Javier Austin reports no  difficulty tolerating PAP;  · She is experiencing significant adverse effects: dry mouth and mask leaks   · She is benefitting from use and reports: sleeping better   · She feels restless leg symptoms are controlled on gabapentin  She is not aware of jerking movements during sleep    Sleep Routine: She reports getting 5 - 6 sleep out of up to 8 hours in bed; she has no difficulty initiating, but reports diffculty maintaining sleep   She awakens spontaneously feeling unrefreshed  She has excessive drowsiness and tends to doze off when sedentary for up to an hour  She rated herself at Total score: 14 /24 on the Sherman sleepiness scale  Habits: reports that she has never smoked  She has never used smokeless tobacco ,  reports that she drinks alcohol ,  reports that she does not use drugs  , Caffeine use: limited , Exercise routine: none   OBJECTIVE: /72   Pulse 74   Ht 5' 2" (1 575 m)   Wt 95 7 kg (211 lb)   BMI 38 59 kg/m²    Patient is well groomed; well appearing  Skin/Extrem: warm & dry; col & hydration normal; no edema  Psych: cooperativeand in no distress  Mental state appears normal   CNS: Alert, orientated, clear & coherent speech  H&N: EOMI; NC/AT:no facial pressure marks, no rashes  Neck Circumference: 46 5 cm  ENMT Mucus membranes normal Nasal airway:patent  Oral airway: crowded  Resp:effort is normal CVS: RRR ABD:truncal obesity MSK:Gait normal     ASSESSMENT: Primary Sleep/Secondary(to Medical or Psych conditions) & comorbidities   1  Obstructive sleep apnea on CPAP  Cpap DME    Cpap DME   2  Dry mouth     3  Sleep disturbance     4  Restless legs syndrome  gabapentin (NEURONTIN) 300 mg capsule   5  Benign essential hypertension     6  Gastroesophageal reflux disease, esophagitis presence not specified     7  Generalized anxiety disorder         PLAN:  1  Treatment with  PAP is medically necessary and Gregoria Navarro is agreable to continue use  2  Care of equipment, methods to improve comfort using PAP and importance of compliance with therapy were discussed  3  Pressure settin cm H2O     4  The patient's current unit has almost reached its 5 yr reasonable, useful life and needs to be replaced  Current machine does not have a humidifier and a re-placement may resolve her dry mouth  5  Rx provided to replace supplies and Care coordinated with DME provider     6   I advised on strategies to improve dryness  Specifically, adequate treatment of nasal allergies, using Biotene mouthwash &/or Xylimelt  7  Continue gabapentin 300 mg q h s  8  Cognitive behavioral therapy was initiated, Sleep Hygiene and behavioral techniques to manage Insomnia were discussed  Specifically limiting time in bed to 7-1/2 hours or less, re-commencing her exercise routine and on relaxation techniques  9  Strategies for weight reduction were discussed  10  Follow-up is advised in or sooner if needed to monitor progress, compliance and to adjust therapy  Thank you for allowing me to participate in the care of this patient      Sincerely,    Authenticated electronically by Anna Reyes MD on 19/45/35   Board Certified Specialist

## 2019-02-11 NOTE — PROGRESS NOTES
Review of Systems      Genitourinary none   Cardiology none   Gastrointestinal none   Neurology need to move extremities   Constitutional fatigue   Integumentary none   Psychiatry none   Musculoskeletal joint pain, muscle aches, legs twitching/jerking and leg cramps   Pulmonary none   ENT throat clearing and ringing in ears   Endocrine excessive thirst   Hematological none

## 2019-03-07 ENCOUNTER — OFFICE VISIT (OUTPATIENT)
Dept: FAMILY MEDICINE CLINIC | Facility: CLINIC | Age: 74
End: 2019-03-07

## 2019-03-07 VITALS
TEMPERATURE: 99.1 F | SYSTOLIC BLOOD PRESSURE: 124 MMHG | DIASTOLIC BLOOD PRESSURE: 80 MMHG | RESPIRATION RATE: 18 BRPM | HEART RATE: 82 BPM | WEIGHT: 208.8 LBS | HEIGHT: 62 IN | BODY MASS INDEX: 38.42 KG/M2

## 2019-03-07 DIAGNOSIS — M25.552 LEFT HIP PAIN: ICD-10-CM

## 2019-03-07 DIAGNOSIS — R53.82 CHRONIC FATIGUE: Primary | ICD-10-CM

## 2019-03-07 PROCEDURE — 99214 OFFICE O/P EST MOD 30 MIN: CPT | Performed by: FAMILY MEDICINE

## 2019-03-07 NOTE — ASSESSMENT & PLAN NOTE
Seems like SI joint pain  Try NSAID for 3-4 days then PT and if not improving then ortho referral - given all the information for all of these interventions

## 2019-03-07 NOTE — PATIENT INSTRUCTIONS
Take aleve 440mg twice a day for 3-4 days and if not improving then do Physical therapy and if not improving then call orthopedics

## 2019-03-07 NOTE — PROGRESS NOTES
Shayan Whitney 1945 female MRN: [id number]    Family Medicine Follow-up Visit    ASSESSMENT/PLAN  No problem-specific Assessment & Plan notes found for this encounter  Future Appointments   Date Time Provider Jasen Joshua   5/16/2019  1:15 PM Jamar Bautista PA-C [facility]   5/23/2019  9:40 AM Sujata Hansen DO SW [facility]   10/21/2019  1:00 PM Tanner Diaz MD [facility]          SUBJECTIVE  CC: Follow-up and Hip Pain (left)      HPI:  Shayan Whitney is a 69 y o  female who presents for  Here to follow up on fatigue  Was briefly evaluated at last visit  TSH was done and was normal    Thought this might be related to depression/anxiety  Currently complaining of fatigue   She did get a new cpap and is going to have another visit with sleep medicine in April  Her complaints are that she gets tired in the afternoon and takes a nap and then has a hard time to get up after her nap  After her most recent visit with sleep she feels a litle better and is getting about 7 hours/night which is better  Still having trouble after nap  Hip pain - left sided - radiates up the back but not further  Antalgic gait  She feels that this is limiting her ability to walk and be active  PHQ2 - negative  Review of Systems   Constitutional: Negative for activity change, chills, fever and unexpected weight change  HENT: Negative for congestion  Eyes: Negative for visual disturbance  Respiratory: Negative for cough, chest tightness, shortness of breath and wheezing  Cardiovascular: Negative for chest pain  Gastrointestinal: Negative for abdominal pain, diarrhea and nausea  Endocrine: Negative for cold intolerance and heat intolerance  Musculoskeletal: Negative for arthralgias and myalgias  Skin: Negative for color change  Neurological: Negative for dizziness     Psychiatric/Behavioral: Negative for agitation, dysphoric mood and sleep disturbance  Historical Information   The patient history was reviewed as follows:    Past Medical History:   Diagnosis Date    Abnormal mammogram of right breast 06/29/2017    Acute medial meniscal tear, left, initial encounter 06/29/2017    Arthritis     Generalized anxiety disorder 08/31/2017    GERD (gastroesophageal reflux disease)     Herpes zoster 09/26/2016    Hyperlipidemia     Hypertension     Hypokalemia     Left medial knee pain     Localized osteoarthritis of left knee 03/15/2016    Post-traumatic headache 09/19/2017    Restless leg syndrome      Past Surgical History:   Procedure Laterality Date    ANKLE FRACTURE SURGERY Right     ARTHROSCOPY KNEE Left 5/26/2016    Procedure: ARTHROSCOPY KNEE, PARTILA MEDIAL MENISECTOMY;  Surgeon: Noemi Rangel MD;  [facility];  Service:    Deion Calvillo CHOLECYSTECTOMY      HYSTERECTOMY      SQUAMOUS CELL CARCINOMA EXCISION      forehead     Family History   Problem Relation Age of Onset    Stroke Mother     Stroke Father     Hypertension Other     Diabetes Other     Cerebral aneurysm Sister     Uterine cancer Sister       Social History   Social History     Substance and Sexual Activity   Alcohol Use Yes    Comment: rare     Social History     Substance and Sexual Activity   Drug Use No     Social History     Tobacco Use   Smoking Status Never Smoker   Smokeless Tobacco Never Used       Medications:     Current Outpatient Medications:     aspirin 81 MG tablet, Take 81 mg by mouth daily  , Disp: , Rfl:     atorvastatin (LIPITOR) 10 mg tablet, Take 1 tablet (10 mg total) by mouth daily, Disp: 90 tablet, Rfl: 1    fluorouracil (EFUDEX) 5 % cream, Apply 1 application topically daily, Disp: , Rfl: 1    gabapentin (NEURONTIN) 300 mg capsule, Take 1 capsule (300 mg total) by mouth daily at bedtime for 360 days Take 1 half an hour before bedtime, Disp: 90 capsule, Rfl: 3    losartan (COZAAR) 50 mg tablet, Take 1 tablet (50 mg total) by mouth daily Losartan Potassium 50 MG Oral Tablet Take 1 tablet daily  Quantity: 90;  Refills: 1    Metzgar, Roselynn Curling DO;  Started 27-June-2012 Active, Disp: 90 tablet, Rfl: 1    LUMIGAN 0 01 % ophthalmic drops, INSTILL 1 DROP INTO BOTH EYES EVERY DAY AT NIGHT, Disp: , Rfl: 3    omeprazole (PriLOSEC) 20 mg delayed release capsule, Take 1 capsule (20 mg total) by mouth daily, Disp: 90 capsule, Rfl: 1    potassium chloride (K-DUR,KLOR-CON) 10 mEq tablet, , Disp: , Rfl:     timolol (TIMOPTIC) 0 5 % ophthalmic solution, INSTILL 1 DROP INTO BOTH EYES IN THE MORNING, Disp: , Rfl: 3    triamterene-hydrochlorothiazide (DYAZIDE) 37 5-25 mg per capsule, Take 1 capsule by mouth daily, Disp: 90 capsule, Rfl: 1    verapamil (CALAN) 40 mg tablet, Take 1 tab qAM, 2 tabs qPM and 1 tab qHS  , Disp: 360 tablet, Rfl: 1    latanoprost (XALATAN) 0 005 % ophthalmic solution, Administer 1 drop to both eyes daily at bedtime, Disp: , Rfl: 6  Allergies   Allergen Reactions    Penicillins Rash     itching       OBJECTIVE    Vitals:   Vitals:    03/07/19 1136   BP: 124/80   Pulse: 82   Resp: 18   Temp: 99 1 °F (37 3 °C)   Weight: 94 7 kg (208 lb 12 8 oz)   Height: 5' 2" (1 575 m)           Physical Exam   Constitutional: She is oriented to person, place, and time  She appears well-developed and well-nourished  HENT:   Head: Normocephalic and atraumatic  Cardiovascular: Normal rate, regular rhythm and normal heart sounds  Pulmonary/Chest: Effort normal and breath sounds normal    Abdominal: Soft  Bowel sounds are normal    Musculoskeletal: She exhibits tenderness  Somewhat of an antalgic gait  Tenderness over SI joint on the left  Fair ROM  No actual hip joint pain  Neurological: She is alert and oriented to person, place, and time  Skin: Skin is warm and dry  Psychiatric: She has a normal mood and affect   Her behavior is normal  Judgment normal

## 2019-04-11 DIAGNOSIS — I10 ESSENTIAL HYPERTENSION: ICD-10-CM

## 2019-04-12 RX ORDER — TRIAMTERENE AND HYDROCHLOROTHIAZIDE 37.5; 25 MG/1; MG/1
CAPSULE ORAL
Qty: 90 CAPSULE | Refills: 1 | Status: SHIPPED | OUTPATIENT
Start: 2019-04-12 | End: 2019-10-04 | Stop reason: SDUPTHER

## 2019-04-16 ENCOUNTER — TELEPHONE (OUTPATIENT)
Dept: SLEEP CENTER | Facility: CLINIC | Age: 74
End: 2019-04-16

## 2019-04-16 DIAGNOSIS — G47.33 OSA (OBSTRUCTIVE SLEEP APNEA): Primary | ICD-10-CM

## 2019-04-17 ENCOUNTER — TELEPHONE (OUTPATIENT)
Dept: FAMILY MEDICINE CLINIC | Facility: CLINIC | Age: 74
End: 2019-04-17

## 2019-04-21 DIAGNOSIS — I10 BENIGN ESSENTIAL HYPERTENSION: ICD-10-CM

## 2019-04-22 RX ORDER — POTASSIUM CHLORIDE 750 MG/1
TABLET, EXTENDED RELEASE ORAL
Qty: 180 TABLET | Refills: 1 | Status: SHIPPED | OUTPATIENT
Start: 2019-04-22 | End: 2019-10-19 | Stop reason: SDUPTHER

## 2019-04-25 DIAGNOSIS — G47.33 OSA (OBSTRUCTIVE SLEEP APNEA): Primary | ICD-10-CM

## 2019-04-25 DIAGNOSIS — I10 BENIGN ESSENTIAL HYPERTENSION: ICD-10-CM

## 2019-05-14 DIAGNOSIS — Z12.31 ENCOUNTER FOR SCREENING MAMMOGRAM FOR MALIGNANT NEOPLASM OF BREAST: Primary | ICD-10-CM

## 2019-05-16 ENCOUNTER — OFFICE VISIT (OUTPATIENT)
Dept: NEUROLOGY | Facility: CLINIC | Age: 74
End: 2019-05-16
Payer: MEDICARE

## 2019-05-16 VITALS
SYSTOLIC BLOOD PRESSURE: 112 MMHG | HEART RATE: 76 BPM | DIASTOLIC BLOOD PRESSURE: 74 MMHG | BODY MASS INDEX: 38.04 KG/M2 | WEIGHT: 208 LBS

## 2019-05-16 DIAGNOSIS — G25.81 RESTLESS LEGS SYNDROME: ICD-10-CM

## 2019-05-16 DIAGNOSIS — M54.81 BILATERAL OCCIPITAL NEURALGIA: Primary | ICD-10-CM

## 2019-05-16 PROCEDURE — 99214 OFFICE O/P EST MOD 30 MIN: CPT | Performed by: PHYSICIAN ASSISTANT

## 2019-05-22 PROBLEM — E66.01 MORBID OBESITY (HCC): Status: ACTIVE | Noted: 2019-05-22

## 2019-05-23 ENCOUNTER — OFFICE VISIT (OUTPATIENT)
Dept: FAMILY MEDICINE CLINIC | Facility: CLINIC | Age: 74
End: 2019-05-23

## 2019-05-23 VITALS
RESPIRATION RATE: 16 BRPM | TEMPERATURE: 99.5 F | HEART RATE: 60 BPM | SYSTOLIC BLOOD PRESSURE: 120 MMHG | WEIGHT: 211.4 LBS | BODY MASS INDEX: 38.9 KG/M2 | DIASTOLIC BLOOD PRESSURE: 72 MMHG | HEIGHT: 62 IN

## 2019-05-23 DIAGNOSIS — J44.9 OSA AND COPD OVERLAP SYNDROME (HCC): ICD-10-CM

## 2019-05-23 DIAGNOSIS — M25.552 PAIN OF BOTH HIP JOINTS: ICD-10-CM

## 2019-05-23 DIAGNOSIS — G47.33 OBSTRUCTIVE SLEEP APNEA ON CPAP: ICD-10-CM

## 2019-05-23 DIAGNOSIS — E78.5 HYPERLIPIDEMIA, UNSPECIFIED HYPERLIPIDEMIA TYPE: ICD-10-CM

## 2019-05-23 DIAGNOSIS — G47.33 OSA AND COPD OVERLAP SYNDROME (HCC): ICD-10-CM

## 2019-05-23 DIAGNOSIS — E66.01 MORBID OBESITY (HCC): ICD-10-CM

## 2019-05-23 DIAGNOSIS — I10 BENIGN ESSENTIAL HYPERTENSION: ICD-10-CM

## 2019-05-23 DIAGNOSIS — Z00.00 HEALTHCARE MAINTENANCE: Primary | ICD-10-CM

## 2019-05-23 DIAGNOSIS — Z99.89 OBSTRUCTIVE SLEEP APNEA ON CPAP: ICD-10-CM

## 2019-05-23 DIAGNOSIS — E11.9 TYPE 2 DIABETES MELLITUS WITHOUT COMPLICATION, WITHOUT LONG-TERM CURRENT USE OF INSULIN (HCC): ICD-10-CM

## 2019-05-23 DIAGNOSIS — M25.551 PAIN OF BOTH HIP JOINTS: ICD-10-CM

## 2019-05-23 PROCEDURE — 99214 OFFICE O/P EST MOD 30 MIN: CPT | Performed by: FAMILY MEDICINE

## 2019-05-23 RX ORDER — BRINZOLAMIDE 10 MG/ML
2 SUSPENSION/ DROPS OPHTHALMIC 2 TIMES DAILY
COMMUNITY
End: 2021-03-18

## 2019-05-29 ENCOUNTER — APPOINTMENT (OUTPATIENT)
Dept: LAB | Facility: CLINIC | Age: 74
End: 2019-05-29
Payer: MEDICARE

## 2019-05-29 ENCOUNTER — TELEPHONE (OUTPATIENT)
Dept: SLEEP CENTER | Facility: CLINIC | Age: 74
End: 2019-05-29

## 2019-05-29 DIAGNOSIS — G25.81 RESTLESS LEGS SYNDROME: Primary | ICD-10-CM

## 2019-05-29 DIAGNOSIS — E78.5 HYPERLIPIDEMIA, UNSPECIFIED HYPERLIPIDEMIA TYPE: ICD-10-CM

## 2019-05-29 DIAGNOSIS — E11.9 TYPE 2 DIABETES MELLITUS WITHOUT COMPLICATION, WITHOUT LONG-TERM CURRENT USE OF INSULIN (HCC): ICD-10-CM

## 2019-05-29 DIAGNOSIS — I10 BENIGN ESSENTIAL HYPERTENSION: ICD-10-CM

## 2019-05-29 LAB
ALBUMIN SERPL BCP-MCNC: 3.8 G/DL (ref 3.5–5)
ALP SERPL-CCNC: 84 U/L (ref 46–116)
ALT SERPL W P-5'-P-CCNC: 43 U/L (ref 12–78)
ANION GAP SERPL CALCULATED.3IONS-SCNC: 8 MMOL/L (ref 4–13)
AST SERPL W P-5'-P-CCNC: 30 U/L (ref 5–45)
BASOPHILS # BLD AUTO: 0.04 THOUSANDS/ΜL (ref 0–0.1)
BASOPHILS NFR BLD AUTO: 1 % (ref 0–1)
BILIRUB SERPL-MCNC: 0.6 MG/DL (ref 0.2–1)
BUN SERPL-MCNC: 14 MG/DL (ref 5–25)
CALCIUM SERPL-MCNC: 9.8 MG/DL (ref 8.3–10.1)
CHLORIDE SERPL-SCNC: 102 MMOL/L (ref 100–108)
CHOLEST SERPL-MCNC: 126 MG/DL (ref 50–200)
CO2 SERPL-SCNC: 29 MMOL/L (ref 21–32)
CREAT SERPL-MCNC: 1 MG/DL (ref 0.6–1.3)
EOSINOPHIL # BLD AUTO: 0.21 THOUSAND/ΜL (ref 0–0.61)
EOSINOPHIL NFR BLD AUTO: 3 % (ref 0–6)
ERYTHROCYTE [DISTWIDTH] IN BLOOD BY AUTOMATED COUNT: 13.1 % (ref 11.6–15.1)
EST. AVERAGE GLUCOSE BLD GHB EST-MCNC: 151 MG/DL
GFR SERPL CREATININE-BSD FRML MDRD: 56 ML/MIN/1.73SQ M
GLUCOSE P FAST SERPL-MCNC: 139 MG/DL (ref 65–99)
HBA1C MFR BLD: 6.9 % (ref 4.2–6.3)
HCT VFR BLD AUTO: 41.1 % (ref 34.8–46.1)
HDLC SERPL-MCNC: 48 MG/DL (ref 40–60)
HGB BLD-MCNC: 13.7 G/DL (ref 11.5–15.4)
IMM GRANULOCYTES # BLD AUTO: 0.04 THOUSAND/UL (ref 0–0.2)
IMM GRANULOCYTES NFR BLD AUTO: 1 % (ref 0–2)
LDLC SERPL CALC-MCNC: 48 MG/DL (ref 0–100)
LYMPHOCYTES # BLD AUTO: 1.91 THOUSANDS/ΜL (ref 0.6–4.47)
LYMPHOCYTES NFR BLD AUTO: 26 % (ref 14–44)
MCH RBC QN AUTO: 31.4 PG (ref 26.8–34.3)
MCHC RBC AUTO-ENTMCNC: 33.3 G/DL (ref 31.4–37.4)
MCV RBC AUTO: 94 FL (ref 82–98)
MONOCYTES # BLD AUTO: 0.56 THOUSAND/ΜL (ref 0.17–1.22)
MONOCYTES NFR BLD AUTO: 8 % (ref 4–12)
NEUTROPHILS # BLD AUTO: 4.52 THOUSANDS/ΜL (ref 1.85–7.62)
NEUTS SEG NFR BLD AUTO: 61 % (ref 43–75)
NONHDLC SERPL-MCNC: 78 MG/DL
NRBC BLD AUTO-RTO: 0 /100 WBCS
PLATELET # BLD AUTO: 348 THOUSANDS/UL (ref 149–390)
PMV BLD AUTO: 10.1 FL (ref 8.9–12.7)
POTASSIUM SERPL-SCNC: 4 MMOL/L (ref 3.5–5.3)
PROT SERPL-MCNC: 7.6 G/DL (ref 6.4–8.2)
RBC # BLD AUTO: 4.36 MILLION/UL (ref 3.81–5.12)
SODIUM SERPL-SCNC: 139 MMOL/L (ref 136–145)
TRIGL SERPL-MCNC: 150 MG/DL
WBC # BLD AUTO: 7.28 THOUSAND/UL (ref 4.31–10.16)

## 2019-05-29 PROCEDURE — 83036 HEMOGLOBIN GLYCOSYLATED A1C: CPT

## 2019-05-29 PROCEDURE — 85025 COMPLETE CBC W/AUTO DIFF WBC: CPT

## 2019-05-29 PROCEDURE — 80053 COMPREHEN METABOLIC PANEL: CPT

## 2019-05-29 PROCEDURE — 80061 LIPID PANEL: CPT

## 2019-05-29 PROCEDURE — 36415 COLL VENOUS BLD VENIPUNCTURE: CPT

## 2019-05-29 RX ORDER — GABAPENTIN 100 MG/1
100 CAPSULE ORAL
Qty: 90 CAPSULE | Refills: 0 | Status: SHIPPED | OUTPATIENT
Start: 2019-05-29 | End: 2019-08-12 | Stop reason: SDUPTHER

## 2019-06-03 ENCOUNTER — HOSPITAL ENCOUNTER (OUTPATIENT)
Dept: RADIOLOGY | Age: 74
Discharge: HOME | End: 2019-06-03
Payer: MEDICARE

## 2019-06-03 VITALS — HEIGHT: 63 IN | BODY MASS INDEX: 36.86 KG/M2 | WEIGHT: 208 LBS

## 2019-06-03 DIAGNOSIS — Z12.31 ENCOUNTER FOR SCREENING MAMMOGRAM FOR MALIGNANT NEOPLASM OF BREAST: ICD-10-CM

## 2019-06-03 PROCEDURE — 77067 SCR MAMMO BI INCL CAD: CPT

## 2019-06-03 PROCEDURE — 77063 BREAST TOMOSYNTHESIS BI: CPT

## 2019-06-07 ENCOUNTER — HOSPITAL ENCOUNTER (OUTPATIENT)
Dept: ULTRASOUND IMAGING | Facility: CLINIC | Age: 74
Discharge: HOME | End: 2019-06-07
Payer: MEDICARE

## 2019-06-07 ENCOUNTER — HOSPITAL ENCOUNTER (OUTPATIENT)
Dept: MAMMOGRAPHY | Facility: CLINIC | Age: 74
Discharge: HOME | End: 2019-06-07
Payer: MEDICARE

## 2019-06-07 VITALS — BODY MASS INDEX: 36.86 KG/M2 | HEIGHT: 63 IN | WEIGHT: 208 LBS

## 2019-06-07 DIAGNOSIS — R92.8 ABNORMAL MAMMOGRAM: ICD-10-CM

## 2019-06-07 PROCEDURE — 77065 DX MAMMO INCL CAD UNI: CPT

## 2019-06-07 PROCEDURE — 76642 ULTRASOUND BREAST LIMITED: CPT

## 2019-06-07 PROCEDURE — G0279 TOMOSYNTHESIS, MAMMO: HCPCS

## 2019-06-10 ENCOUNTER — HOSPITAL ENCOUNTER (OUTPATIENT)
Dept: SLEEP CENTER | Facility: CLINIC | Age: 74
Discharge: HOME | End: 2019-06-10
Payer: MEDICARE

## 2019-06-10 DIAGNOSIS — G47.33 OSA (OBSTRUCTIVE SLEEP APNEA): ICD-10-CM

## 2019-06-10 PROCEDURE — 95811 POLYSOM 6/>YRS CPAP 4/> PARM: CPT

## 2019-06-11 DIAGNOSIS — G47.33 OSA (OBSTRUCTIVE SLEEP APNEA): Primary | ICD-10-CM

## 2019-06-12 ENCOUNTER — TELEPHONE (OUTPATIENT)
Dept: SLEEP CENTER | Facility: CLINIC | Age: 74
End: 2019-06-12

## 2019-06-20 ENCOUNTER — OFFICE VISIT (OUTPATIENT)
Dept: PODIATRY | Facility: CLINIC | Age: 74
End: 2019-06-20
Payer: MEDICARE

## 2019-06-20 VITALS
WEIGHT: 208 LBS | HEART RATE: 78 BPM | DIASTOLIC BLOOD PRESSURE: 84 MMHG | HEIGHT: 62 IN | BODY MASS INDEX: 38.28 KG/M2 | SYSTOLIC BLOOD PRESSURE: 147 MMHG

## 2019-06-20 DIAGNOSIS — E11.9 TYPE 2 DIABETES MELLITUS WITHOUT COMPLICATION, WITHOUT LONG-TERM CURRENT USE OF INSULIN (HCC): Primary | ICD-10-CM

## 2019-06-20 DIAGNOSIS — M19.071 PRIMARY OSTEOARTHRITIS OF RIGHT ANKLE: ICD-10-CM

## 2019-06-20 DIAGNOSIS — M24.874 OTHER SPECIFIC JOINT DERANGEMENTS OF RIGHT FOOT, NOT ELSEWHERE CLASSIFIED: ICD-10-CM

## 2019-06-20 PROCEDURE — 99213 OFFICE O/P EST LOW 20 MIN: CPT | Performed by: PODIATRIST

## 2019-06-20 PROCEDURE — 20600 DRAIN/INJ JOINT/BURSA W/O US: CPT | Performed by: PODIATRIST

## 2019-06-20 RX ORDER — LIDOCAINE HYDROCHLORIDE 10 MG/ML
2 INJECTION, SOLUTION EPIDURAL; INFILTRATION; INTRACAUDAL; PERINEURAL ONCE
Status: COMPLETED | OUTPATIENT
Start: 2019-06-20 | End: 2019-06-20

## 2019-06-20 RX ORDER — TRIAMCINOLONE ACETONIDE 40 MG/ML
20 INJECTION, SUSPENSION INTRA-ARTICULAR; INTRAMUSCULAR ONCE
Status: COMPLETED | OUTPATIENT
Start: 2019-06-20 | End: 2019-06-20

## 2019-06-20 RX ADMIN — LIDOCAINE HYDROCHLORIDE 2 ML: 10 INJECTION, SOLUTION EPIDURAL; INFILTRATION; INTRACAUDAL; PERINEURAL at 14:33

## 2019-06-20 RX ADMIN — TRIAMCINOLONE ACETONIDE 20 MG: 40 INJECTION, SUSPENSION INTRA-ARTICULAR; INTRAMUSCULAR at 14:34

## 2019-07-01 ENCOUNTER — OFFICE VISIT (OUTPATIENT)
Dept: SLEEP CENTER | Facility: CLINIC | Age: 74
End: 2019-07-01
Payer: MEDICARE

## 2019-07-01 VITALS
SYSTOLIC BLOOD PRESSURE: 126 MMHG | DIASTOLIC BLOOD PRESSURE: 64 MMHG | BODY MASS INDEX: 38.83 KG/M2 | HEIGHT: 62 IN | HEART RATE: 68 BPM | WEIGHT: 211 LBS

## 2019-07-01 DIAGNOSIS — I10 BENIGN ESSENTIAL HYPERTENSION: ICD-10-CM

## 2019-07-01 DIAGNOSIS — G25.81 RESTLESS LEGS SYNDROME: ICD-10-CM

## 2019-07-01 DIAGNOSIS — K21.9 GASTROESOPHAGEAL REFLUX DISEASE, ESOPHAGITIS PRESENCE NOT SPECIFIED: ICD-10-CM

## 2019-07-01 DIAGNOSIS — G47.9 SLEEP DISTURBANCE: ICD-10-CM

## 2019-07-01 DIAGNOSIS — E66.9 OBESITY (BMI 30-39.9): ICD-10-CM

## 2019-07-01 DIAGNOSIS — M54.81 BILATERAL OCCIPITAL NEURALGIA: ICD-10-CM

## 2019-07-01 DIAGNOSIS — F41.1 GENERALIZED ANXIETY DISORDER: ICD-10-CM

## 2019-07-01 DIAGNOSIS — R68.2 DRY MOUTH: ICD-10-CM

## 2019-07-01 DIAGNOSIS — G47.33 OSA (OBSTRUCTIVE SLEEP APNEA): Primary | ICD-10-CM

## 2019-07-01 PROCEDURE — 99214 OFFICE O/P EST MOD 30 MIN: CPT | Performed by: INTERNAL MEDICINE

## 2019-07-01 NOTE — PROGRESS NOTES
Follow-up Note - [address]  69 y o  female  :1945  DFK:[id number]    I saw Minal Lopez in sleep clinic today for her sleep complaints & medical conditions  Patient had a split sleep study and is here to review results and to initiate therapy  The diagnostic portion demonstrated  : AHI of 13 per hour,   and had no stage REM  Intermittent snoring of moderate intensity was noted  Minimum oxygen saturation was 88 %  During the therapeutic portion of the study, his sleep disordered breathing was adequately remediated with nasal CPAP at 10 cm H2O  Mean oxygen saturation was 93% with brief desaturations to a triston of 87%  There were mild periodic limb movements of sleep  PFSH, Problem List, Medications & Allergies were reviewed in EMR  Interval changes: none reported  She  has a past medical history of Abnormal mammogram of right breast (2017), Acute medial meniscal tear, left, initial encounter (2017), Arthritis, Generalized anxiety disorder (2017), GERD (gastroesophageal reflux disease), Herpes zoster (2016), Hyperlipidemia, Hypertension, Hypokalemia, Left medial knee pain, Localized osteoarthritis of left knee (03/15/2016), Post-traumatic headache (2017), and Restless leg syndrome  She has a current medication list which includes the following prescription(s): aspirin, atorvastatin, brinzolamide, fluorouracil, gabapentin, gabapentin, klor-con m10, latanoprost, losartan, lumigan, omeprazole, potassium chloride, timolol, triamterene-hydrochlorothiazide, and verapamil  ROS: reviewed see attached  HPI:  She is using CPAP at 14 cm H2O presently  She feels restless leg symptoms and periodic limb movements of sleep are better controlled with gabapentin at 400 mg q h s       Sleep Routine: No interval changes:  She is getting 4-6 hours sleep and has frequent interruptions of sleep    She awakens feeling under refreshed, has excessive drowsiness and takes daytime naps  She rated herself at Total score: 9 /24 on the Yale sleepiness scale  Habits:  reports that she has never smoked  She has never used smokeless tobacco  She reports that she drinks alcohol  She reports that she does not use drugs  EXAM: /64   Pulse 68   Ht 5' 2" (1 575 m)   Wt 95 7 kg (211 lb)   BMI 38 59 kg/m²     Patient is well groomed; well appearing  Skin/Extrem: warm & dry; col & hydration normal; no edema  Psych: cooperativeand in no distress  Mental state appears normal   CNS: Alert, orientated, clear & coherent speech  H&N: EOMI; NC/AT:no facial pressure marks, no rashes  ENMT Mucus membranes appear normal Nasal airway:patent  Oral airway:  crowded  Resp:effort is normal CVS: RRR ABD:truncal obesity MSK:Gait normal     IMPRESSION: Primary Sleep/Secondary(to Medical or Psych conditions) & comorbidities   1  JAYA (obstructive sleep apnea)     2  Restless legs syndrome     3  Sleep disturbance     4  Dry mouth     5  Benign essential hypertension     6  Gastroesophageal reflux disease, esophagitis presence not specified     7  Generalized anxiety disorder     8  Bilateral occipital neuralgia     9  Obesity (BMI 30-39  9)       PLAN:    1  I reviewed results of the Sleep studies with the patient  2  With respect to above conditions, I counseled on pathophysiology, diagnosis, treatment options, risks and benefits; inter-relationship and effects on symptoms and comorbidities; risks of no treatment; costs and insurance aspects  3  Patient elected positive airway pressure therapy and care coordinated with the DME provider for set up in clinic today  Pressure setting 10-14 cm H2O  4  Need for compliance with therapy and weight reduction were emphasized  5  Continue gabapentin 400 mg q h s   6  Cognitive behavioral therapy was initiated, Sleep Hygiene and behavioral techniques to manage Insomnia were discussed         7  Follow-up to be scheduled in 2 months to monitor compliance, progress and to adjust therapy  Thank you for allowing me to participate in the care of this patient  I will keep you apprised of developments      Sincerely,    Authenticated electronically by Phan Panchal MD on 70/72/04   Board Certified Specialist

## 2019-07-01 NOTE — PROGRESS NOTES
Review of Systems      Genitourinary none   Cardiology none   Gastrointestinal none   Neurology need to move extremities   Constitutional none   Integumentary none   Psychiatry none   Musculoskeletal legs twitching/jerking   Pulmonary none   ENT throat clearing   Endocrine excessive thirst   Hematological none

## 2019-07-03 DIAGNOSIS — I10 HYPERTENSION, UNSPECIFIED TYPE: ICD-10-CM

## 2019-07-03 RX ORDER — LOSARTAN POTASSIUM 50 MG/1
TABLET ORAL
Qty: 90 TABLET | Refills: 1 | Status: SHIPPED | OUTPATIENT
Start: 2019-07-03 | End: 2020-01-04

## 2019-07-11 ENCOUNTER — OFFICE VISIT (OUTPATIENT)
Dept: PODIATRY | Facility: CLINIC | Age: 74
End: 2019-07-11
Payer: MEDICARE

## 2019-07-11 VITALS
DIASTOLIC BLOOD PRESSURE: 70 MMHG | WEIGHT: 204.2 LBS | HEART RATE: 73 BPM | HEIGHT: 62 IN | BODY MASS INDEX: 37.58 KG/M2 | SYSTOLIC BLOOD PRESSURE: 139 MMHG

## 2019-07-11 DIAGNOSIS — M19.071 PRIMARY OSTEOARTHRITIS OF RIGHT FOOT: Primary | ICD-10-CM

## 2019-07-11 PROCEDURE — 20600 DRAIN/INJ JOINT/BURSA W/O US: CPT | Performed by: PODIATRIST

## 2019-07-11 RX ORDER — BUPIVACAINE HYDROCHLORIDE 5 MG/ML
1 INJECTION, SOLUTION EPIDURAL; INTRACAUDAL ONCE
Status: COMPLETED | OUTPATIENT
Start: 2019-07-11 | End: 2019-07-11

## 2019-07-11 RX ORDER — BRIMONIDINE TARTRATE 2 MG/ML
SOLUTION/ DROPS OPHTHALMIC
Refills: 3 | COMMUNITY
Start: 2019-06-21 | End: 2019-12-05

## 2019-07-11 RX ORDER — TRIAMCINOLONE ACETONIDE 40 MG/ML
20 INJECTION, SUSPENSION INTRA-ARTICULAR; INTRAMUSCULAR ONCE
Status: COMPLETED | OUTPATIENT
Start: 2019-07-11 | End: 2019-07-11

## 2019-07-11 RX ADMIN — TRIAMCINOLONE ACETONIDE 20 MG: 40 INJECTION, SUSPENSION INTRA-ARTICULAR; INTRAMUSCULAR at 14:48

## 2019-07-11 RX ADMIN — BUPIVACAINE HYDROCHLORIDE 1 ML: 5 INJECTION, SOLUTION EPIDURAL; INTRACAUDAL at 14:48

## 2019-07-11 NOTE — PROGRESS NOTES
PATIENT:  Patti Mckinney  1945       ASSESSMENT:     1  Primary osteoarthritis of right foot               PLAN:  Patient was counseled and educated on the condition and the diagnosis  The diagnosis, treatment options and prognosis were discussed with the patient  Treatment options were discussed and the patient wished to proceed with an injection  Injection was given as in the procedure  Instructed supportive care, icing, and resting  Possible sinus tarsi injection depending on the progress  RA in 4 weeks  Small joint arthrocentesis: R intertarsal  Date/Time: 7/11/2019 2:40 PM  Consent given by: patient  Site marked: site marked  Timeout: Immediately prior to procedure a time out was called to verify the correct patient, procedure, equipment, support staff and site/side marked as required   Supporting Documentation  Indications: pain   Procedure Details  Location: foot - R intertarsal ([address])  Needle size: 25 G  Ultrasound guidance: no  Approach: dorsal    Patient tolerance: patient tolerated the procedure well with no immediate complications      Subjective:       HPI  The patient presents for evaluation of right ankle pain  Pain is 90% better  She has pain around dorsomedial right foot with walking  No prior injury  The following portions of the patient's history were reviewed and updated as appropriate: allergies, current medications, past family history, past medical history, past social history, past surgical history and problem list   All pertinent labs and images were reviewed        Past Medical History  Past Medical History:   Diagnosis Date    Abnormal mammogram of right breast 06/29/2017    Acute medial meniscal tear, left, initial encounter 06/29/2017    Arthritis     Generalized anxiety disorder 08/31/2017    GERD (gastroesophageal reflux disease)     Herpes zoster 09/26/2016    Hyperlipidemia     Hypertension     Hypokalemia     Left medial knee pain     Localized osteoarthritis of left knee 03/15/2016    Post-traumatic headache 09/19/2017    Restless leg syndrome        Past Surgical History  Past Surgical History:   Procedure Laterality Date    ANKLE FRACTURE SURGERY Right     ARTHROSCOPY KNEE Left 5/26/2016    Procedure: ARTHROSCOPY KNEE, PARTILA MEDIAL MENISECTOMY;  Surgeon: Bisi Youssef MD;  [facility];  Service:    Angeles Bender CHOLECYSTECTOMY      HYSTERECTOMY      57    OOPHORECTOMY      65    SQUAMOUS CELL CARCINOMA EXCISION      forehead        Allergies:  Penicillins    Medications:  Current Outpatient Medications   Medication Sig Dispense Refill    aspirin 81 MG tablet Take 81 mg by mouth daily   atorvastatin (LIPITOR) 10 mg tablet Take 1 tablet (10 mg total) by mouth daily 90 tablet 1    brimonidine tartrate 0 2 % ophthalmic solution INSTILL 1 DROP INTO BOTH EYES TWICE A DAY  3    brinzolamide (AZOPT) 1 % ophthalmic suspension Administer 2 drops to both eyes 2 (two) times a day      fluorouracil (EFUDEX) 5 % cream Apply 1 application topically daily  1    gabapentin (NEURONTIN) 100 mg capsule Take 1 capsule (100 mg total) by mouth daily at bedtime Take 1 half hour before bedtime   90 capsule 0    gabapentin (NEURONTIN) 300 mg capsule Take 1 capsule (300 mg total) by mouth daily at bedtime for 360 days Take 1 half an hour before bedtime 90 capsule 3    KLOR-CON M10 10 MEQ tablet TAKE 1 TABLET (10 MEQ TOTAL) BY MOUTH 2 (TWO) TIMES A DAY FOR 90 DAYS 180 tablet 1    latanoprost (XALATAN) 0 005 % ophthalmic solution Administer 1 drop to both eyes daily at bedtime  6    losartan (COZAAR) 50 mg tablet TAKE 1 TABLET BY MOUTH EVERY DAY 90 tablet 1    LUMIGAN 0 01 % ophthalmic drops INSTILL 1 DROP INTO BOTH EYES EVERY DAY AT NIGHT  3    omeprazole (PriLOSEC) 20 mg delayed release capsule Take 1 capsule (20 mg total) by mouth daily 90 capsule 1    potassium chloride (K-DUR,KLOR-CON) 10 mEq tablet       timolol (TIMOPTIC) 0 5 % ophthalmic solution INSTILL 1 DROP INTO BOTH EYES IN THE MORNING  3    triamterene-hydrochlorothiazide (DYAZIDE) 37 5-25 mg per capsule TAKE 1 CAPSULE BY MOUTH EVERY DAY 90 capsule 1    verapamil (CALAN) 40 mg tablet Take 1 tab qAM, 2 tabs qPM and 1 tab qHS  360 tablet 1     No current facility-administered medications for this visit  Social History:  Social History     Socioeconomic History    Marital status: /Civil Union     Spouse name: None    Number of children: None    Years of education: None    Highest education level: None   Occupational History    None   Social Needs    Financial resource strain: None    Food insecurity:     Worry: None     Inability: None    Transportation needs:     Medical: None     Non-medical: None   Tobacco Use    Smoking status: Never Smoker    Smokeless tobacco: Never Used   Substance and Sexual Activity    Alcohol use: Yes     Comment: rare    Drug use: No    Sexual activity: None   Lifestyle    Physical activity:     Days per week: None     Minutes per session: None    Stress: None   Relationships    Social connections:     Talks on phone: None     Gets together: None     Attends Bahai service: None     Active member of club or organization: None     Attends meetings of clubs or organizations: None     Relationship status: None    Intimate partner violence:     Fear of current or ex partner: None     Emotionally abused: None     Physically abused: None     Forced sexual activity: None   Other Topics Concern    None   Social History Narrative    None          Review of Systems   Constitutional: Negative for chills and fever  Respiratory: Negative for cough and shortness of breath  Cardiovascular: Negative for chest pain and palpitations  Gastrointestinal: Negative for diarrhea, nausea and vomiting  Skin: Negative for wound  Neurological: Negative for weakness and numbness           Objective:      /70   Pulse 73   Ht 5' 2" (1 575 m) Comment: verbal  Wt 92 6 kg (204 lb 3 2 oz)   BMI 37 35 kg/m²          Physical Exam   Constitutional: She is oriented to person, place, and time  She appears well-developed  No distress  Cardiovascular: Normal rate, regular rhythm and intact distal pulses  Pulmonary/Chest: Effort normal and breath sounds normal  No respiratory distress  Musculoskeletal:   Chronic LE edema noted  No pain right ankle with palpation and ROM  Minimal tenderness around sinus tarsi right foot with decreased STJ ROM  Focal Pain on dorsal right 1st MCJ  No acute edema  Neurological: She is alert and oriented to person, place, and time  No sensory deficit  Skin: Skin is warm  Capillary refill takes less than 2 seconds  No rash noted  No erythema  Vitals reviewed

## 2019-07-25 DIAGNOSIS — G44.85 STABBING HEADACHE: ICD-10-CM

## 2019-07-25 DIAGNOSIS — K21.9 GASTROESOPHAGEAL REFLUX DISEASE WITHOUT ESOPHAGITIS: ICD-10-CM

## 2019-07-25 DIAGNOSIS — M54.81 BILATERAL OCCIPITAL NEURALGIA: ICD-10-CM

## 2019-07-25 RX ORDER — VERAPAMIL HYDROCHLORIDE 40 MG/1
TABLET ORAL
Qty: 360 TABLET | Refills: 1 | Status: SHIPPED | OUTPATIENT
Start: 2019-07-25 | End: 2020-01-20

## 2019-07-29 RX ORDER — OMEPRAZOLE 20 MG/1
CAPSULE, DELAYED RELEASE ORAL
Qty: 90 CAPSULE | Refills: 1 | Status: SHIPPED | OUTPATIENT
Start: 2019-07-29 | End: 2020-01-27

## 2019-07-31 ENCOUNTER — TELEPHONE (OUTPATIENT)
Dept: FAMILY MEDICINE CLINIC | Facility: CLINIC | Age: 74
End: 2019-07-31

## 2019-07-31 NOTE — TELEPHONE ENCOUNTER
Patient of Dr Esperanza Pringle,  Requesting lab orders prior to appt scheduled for 12/05/19 at 10:50 am   Please let her know when generated in sytem

## 2019-08-02 DIAGNOSIS — E11.9 TYPE 2 DIABETES MELLITUS WITHOUT COMPLICATION, WITHOUT LONG-TERM CURRENT USE OF INSULIN (HCC): Primary | ICD-10-CM

## 2019-08-02 DIAGNOSIS — I10 BENIGN ESSENTIAL HYPERTENSION: ICD-10-CM

## 2019-08-02 DIAGNOSIS — E78.5 HYPERLIPIDEMIA, UNSPECIFIED HYPERLIPIDEMIA TYPE: ICD-10-CM

## 2019-08-02 NOTE — TELEPHONE ENCOUNTER
I had actually placed orders for both her and  at their last visit but I placed them again so that the expected date was 11/2  Thanks

## 2019-08-08 ENCOUNTER — OFFICE VISIT (OUTPATIENT)
Dept: PODIATRY | Facility: CLINIC | Age: 74
End: 2019-08-08
Payer: MEDICARE

## 2019-08-08 VITALS
SYSTOLIC BLOOD PRESSURE: 143 MMHG | DIASTOLIC BLOOD PRESSURE: 70 MMHG | HEART RATE: 66 BPM | BODY MASS INDEX: 38.24 KG/M2 | HEIGHT: 62 IN | WEIGHT: 207.8 LBS

## 2019-08-08 DIAGNOSIS — M19.071 PRIMARY OSTEOARTHRITIS OF RIGHT FOOT: Primary | ICD-10-CM

## 2019-08-08 DIAGNOSIS — E11.9 TYPE 2 DIABETES MELLITUS WITHOUT COMPLICATION, WITHOUT LONG-TERM CURRENT USE OF INSULIN (HCC): ICD-10-CM

## 2019-08-08 PROCEDURE — 99213 OFFICE O/P EST LOW 20 MIN: CPT | Performed by: PODIATRIST

## 2019-08-08 NOTE — PROGRESS NOTES
PATIENT:  Criss Scott  1945       ASSESSMENT:     1  Primary osteoarthritis of right foot     2  Type 2 diabetes mellitus without complication, without long-term current use of insulin ([facility])               PLAN:  Patient was counseled and educated on the condition and the diagnosis  The diagnosis, treatment options and prognosis were discussed with the patient  She responded well to the treatment  Instructed supportive care, icing, home exercise, and arch support  Discussed proper diet and exercise to control BS  Subjective:       HPI  The patient presents for evaluation of right foot pain  Her pain is resolved  No new complaint  No stiffness  No numbness in her feet  She is borderline diabetes without any medication  The following portions of the patient's history were reviewed and updated as appropriate: allergies, current medications, past family history, past medical history, past social history, past surgical history and problem list   All pertinent labs and images were reviewed        Past Medical History  Past Medical History:   Diagnosis Date    Abnormal mammogram of right breast 06/29/2017    Acute medial meniscal tear, left, initial encounter 06/29/2017    Arthritis     Generalized anxiety disorder 08/31/2017    GERD (gastroesophageal reflux disease)     Herpes zoster 09/26/2016    Hyperlipidemia     Hypertension     Hypokalemia     Left medial knee pain     Localized osteoarthritis of left knee 03/15/2016    Post-traumatic headache 09/19/2017    Restless leg syndrome        Past Surgical History  Past Surgical History:   Procedure Laterality Date    ANKLE FRACTURE SURGERY Right     ARTHROSCOPY KNEE Left 5/26/2016    Procedure: ARTHROSCOPY KNEE, PARTILA MEDIAL MENISECTOMY;  Surgeon: Evalyn Collet, MD;  [facility];  Service:    Brandi Yañez CHOLECYSTECTOMY      HYSTERECTOMY      56    OOPHORECTOMY      56    SQUAMOUS CELL CARCINOMA EXCISION forehead        Allergies:  Penicillins    Medications:  Current Outpatient Medications   Medication Sig Dispense Refill    aspirin 81 MG tablet Take 81 mg by mouth daily   atorvastatin (LIPITOR) 10 mg tablet Take 1 tablet (10 mg total) by mouth daily 90 tablet 1    brimonidine tartrate 0 2 % ophthalmic solution INSTILL 1 DROP INTO BOTH EYES TWICE A DAY  3    brinzolamide (AZOPT) 1 % ophthalmic suspension Administer 2 drops to both eyes 2 (two) times a day      fluorouracil (EFUDEX) 5 % cream Apply 1 application topically daily  1    gabapentin (NEURONTIN) 100 mg capsule Take 1 capsule (100 mg total) by mouth daily at bedtime Take 1 half hour before bedtime  90 capsule 0    gabapentin (NEURONTIN) 300 mg capsule Take 1 capsule (300 mg total) by mouth daily at bedtime for 360 days Take 1 half an hour before bedtime 90 capsule 3    KLOR-CON M10 10 MEQ tablet TAKE 1 TABLET (10 MEQ TOTAL) BY MOUTH 2 (TWO) TIMES A DAY FOR 90 DAYS 180 tablet 1    latanoprost (XALATAN) 0 005 % ophthalmic solution Administer 1 drop to both eyes daily at bedtime  6    losartan (COZAAR) 50 mg tablet TAKE 1 TABLET BY MOUTH EVERY DAY 90 tablet 1    LUMIGAN 0 01 % ophthalmic drops INSTILL 1 DROP INTO BOTH EYES EVERY DAY AT NIGHT  3    omeprazole (PriLOSEC) 20 mg delayed release capsule TAKE 1 CAPSULE BY MOUTH EVERY DAY 90 capsule 1    potassium chloride (K-DUR,KLOR-CON) 10 mEq tablet       timolol (TIMOPTIC) 0 5 % ophthalmic solution INSTILL 1 DROP INTO BOTH EYES IN THE MORNING  3    triamterene-hydrochlorothiazide (DYAZIDE) 37 5-25 mg per capsule TAKE 1 CAPSULE BY MOUTH EVERY DAY 90 capsule 1    verapamil (CALAN) 40 mg tablet TAKE 1 TABLET BY MOUTH EVERY MORNING , 2 TABLETS EVERY EVENING TAKE 1 TABLET AT BEDTIME 360 tablet 1     No current facility-administered medications for this visit          Social History:  Social History     Socioeconomic History    Marital status: /Civil Union     Spouse name: None    Number of children: None    Years of education: None    Highest education level: None   Occupational History    None   Social Needs    Financial resource strain: None    Food insecurity:     Worry: None     Inability: None    Transportation needs:     Medical: None     Non-medical: None   Tobacco Use    Smoking status: Never Smoker    Smokeless tobacco: Never Used   Substance and Sexual Activity    Alcohol use: Yes     Comment: rare    Drug use: No    Sexual activity: None   Lifestyle    Physical activity:     Days per week: None     Minutes per session: None    Stress: None   Relationships    Social connections:     Talks on phone: None     Gets together: None     Attends Synagogue service: None     Active member of club or organization: None     Attends meetings of clubs or organizations: None     Relationship status: None    Intimate partner violence:     Fear of current or ex partner: None     Emotionally abused: None     Physically abused: None     Forced sexual activity: None   Other Topics Concern    None   Social History Narrative    None          Review of Systems   Constitutional: Negative for chills and fever  Respiratory: Negative for cough and shortness of breath  Cardiovascular: Negative for chest pain and palpitations  Gastrointestinal: Negative for diarrhea, nausea and vomiting  Skin: Negative for wound  Neurological: Negative for weakness and numbness  Objective:      /70   Pulse 66   Ht 5' 2" (1 575 m)   Wt 94 3 kg (207 lb 12 8 oz)   BMI 38 01 kg/m²          Physical Exam   Constitutional: She is oriented to person, place, and time  She appears well-developed  No distress  Cardiovascular: Normal rate, regular rhythm and intact distal pulses  Pulmonary/Chest: Effort normal and breath sounds normal  No respiratory distress  Musculoskeletal:   Chronic LE edema noted  No pain right ankle with palpation and ROM    No tenderness around sinus tarsi right foot with improved STJ ROM  No Pain on right 1st MCJ with palpation or ROM  No acute edema  Neurological: She is alert and oriented to person, place, and time  No sensory deficit  Skin: Skin is warm  Capillary refill takes less than 2 seconds  No rash noted  No erythema  Vitals reviewed

## 2019-08-12 DIAGNOSIS — G44.85 STABBING HEADACHE: ICD-10-CM

## 2019-08-12 DIAGNOSIS — M54.81 BILATERAL OCCIPITAL NEURALGIA: ICD-10-CM

## 2019-08-12 DIAGNOSIS — G25.81 RESTLESS LEGS SYNDROME: ICD-10-CM

## 2019-08-12 RX ORDER — GABAPENTIN 100 MG/1
100 CAPSULE ORAL
Qty: 90 CAPSULE | Refills: 0 | Status: SHIPPED | OUTPATIENT
Start: 2019-08-12 | End: 2019-09-09 | Stop reason: DRUGHIGH

## 2019-08-12 NOTE — TELEPHONE ENCOUNTER
Patient left message she need refill on 100 mg Gabapentin capsules     Her next appointment is 9/9/2019

## 2019-09-09 ENCOUNTER — OFFICE VISIT (OUTPATIENT)
Dept: SLEEP CENTER | Facility: CLINIC | Age: 74
End: 2019-09-09
Payer: MEDICARE

## 2019-09-09 VITALS
WEIGHT: 208 LBS | SYSTOLIC BLOOD PRESSURE: 110 MMHG | HEIGHT: 62 IN | BODY MASS INDEX: 38.28 KG/M2 | DIASTOLIC BLOOD PRESSURE: 70 MMHG

## 2019-09-09 DIAGNOSIS — E66.9 OBESITY (BMI 30-39.9): ICD-10-CM

## 2019-09-09 DIAGNOSIS — G47.33 OBSTRUCTIVE SLEEP APNEA: Primary | ICD-10-CM

## 2019-09-09 DIAGNOSIS — G25.81 RESTLESS LEGS SYNDROME: ICD-10-CM

## 2019-09-09 DIAGNOSIS — F41.1 GENERALIZED ANXIETY DISORDER: ICD-10-CM

## 2019-09-09 DIAGNOSIS — G47.9 SLEEP DISTURBANCE: ICD-10-CM

## 2019-09-09 DIAGNOSIS — R40.0 DAYTIME SLEEPINESS: ICD-10-CM

## 2019-09-09 DIAGNOSIS — R68.2 DRY MOUTH: ICD-10-CM

## 2019-09-09 DIAGNOSIS — I10 BENIGN ESSENTIAL HYPERTENSION: ICD-10-CM

## 2019-09-09 PROCEDURE — 99214 OFFICE O/P EST MOD 30 MIN: CPT | Performed by: INTERNAL MEDICINE

## 2019-09-09 RX ORDER — GABAPENTIN 100 MG/1
100 CAPSULE ORAL
Qty: 90 CAPSULE | Refills: 0 | Status: SHIPPED | OUTPATIENT
Start: 2019-09-09 | End: 2020-02-04

## 2019-09-09 NOTE — PATIENT INSTRUCTIONS

## 2019-09-09 NOTE — PROGRESS NOTES
Follow-Up Note - [address]  69 y o  female  :1945  CWQ:[id number]    CC: I saw this patient for follow-up in clinic today for her Sleep Disordered Breathing, Coexisting Sleep and Medical Problems  She got a replacement CPAP machine around 2 months ago  Patient had a split sleep study in 2019: The diagnostic portion demonstrated  : AHI of 13 per hour,   and had no stage REM  Intermittent snoring of moderate intensity was noted  Minimum oxygen saturation was 88 %  During the therapeutic portion of the study, his sleep disordered breathing was adequately remediated with nasal CPAP at 10 cm H2O  Mean oxygen saturation was 93% with brief desaturations to a triston of 87%  There were mild periodic limb movements of sleep  PFSH, Problem List, Medications & Allergies were reviewed in EMR  Interval changes: none reported  She  has a past medical history of Abnormal mammogram of right breast (2017), Acute medial meniscal tear, left, initial encounter (2017), Arthritis, Generalized anxiety disorder (2017), GERD (gastroesophageal reflux disease), Herpes zoster (2016), Hyperlipidemia, Hypertension, Hypokalemia, Left medial knee pain, Localized osteoarthritis of left knee (03/15/2016), Post-traumatic headache (2017), and Restless leg syndrome  She has a current medication list which includes the following prescription(s): aspirin, atorvastatin, brimonidine tartrate, brinzolamide, fluorouracil, gabapentin, gabapentin, klor-con m10, latanoprost, losartan, lumigan, omeprazole, potassium chloride, timolol, triamterene-hydrochlorothiazide, and verapamil  ROS: Reviewed (see attached)  She has ongoing anxiety  DATA REVIEWED:  using PAP > 4 hours/night 100% of the time   Estimated HERVE 0 9/hour at pressure of 10cm H2O     SUBJECTIVE: Regarding use of PAP, Nasim Gilmore reports:   · She is experiencing no  adverse effects:   · She is   benefiting from use: sleeping better   · Restless leg symptoms are controlled with gabapentin at 400 mg  Sleep Routine: She reports getting 6-7 hrs sleep  ; she has no difficulty initiating or maintaining sleep   She awakens spontaneously and is not always refreshed  She has excessive drowsiness and dozes off in the afternoons for up to 45 minutes    She rated herself at Total score: 8 /24 on the [place] sleepiness scale  Habits: reports that she has never smoked  She has never used smokeless tobacco ,  reports that she drinks alcohol ,  reports that she does not use drugs  , Caffeine use: limited , Exercise routine: sometimes   OBJECTIVE: /70   Ht 5' 2" (1 575 m)   Wt 94 3 kg (208 lb)   BMI 38 04 kg/m²    Constitutional: Patient is well groomed; well appearing  Skin/Extrem: warm & dry; col & hydration normal; no edema  Psych: cooperativeand in no distress  Mental State appears normal, Anxious  CNS: Alert, orientated, clear & coherent speech  H&N: EOMI; NC/AT:no facial pressure marks, no rashes  "    ENMT Mucus membranes normal Nasal airway:patent  Oral airway: crowded  Resp:effort is normal CVS: RRR ABD:truncal obesity MSK:Gait normal     ASSESSMENT: Primary Sleep/Secondary(to Medical or Psych conditions) & comorbidities   1  Obstructive sleep apnea     2  Restless legs syndrome     3  Sleep disturbance     4  Daytime sleepiness     5  Dry mouth     6  Generalized anxiety disorder     7  Benign essential hypertension     8  Obesity (BMI 30-39  9)       PLAN:  1  Treatment with  PAP is medically necessary and Edmar Jain is agreable to continue use  2  Care of equipment, methods to improve comfort using PAP and importance of compliance with therapy were discussed  3  Pressure setting: continue 10 cmH2O     4  Rx provided to replace supplies and Care coordinated with DME provider  5  Continue gabapentin 400 mg q h s  6  Strategies for weight reduction were discussed      7  Follow-up is advised in 1 year or sooner if needed to monitor progress, compliance and to adjust therapy  Thank you for allowing me to participate in the care of this patient      Sincerely,    Authenticated electronically by Chintan Hayden MD on 63/28/42   Board Certified Specialist

## 2019-09-09 NOTE — PROGRESS NOTES
Review of Systems      Genitourinary none   Cardiology none   Gastrointestinal none   Neurology balance problems   Constitutional none   Integumentary none   Psychiatry none   Musculoskeletal none   Pulmonary none   ENT throat clearing   Endocrine none   Hematological none

## 2019-09-11 ENCOUNTER — TELEPHONE (OUTPATIENT)
Dept: SLEEP CENTER | Facility: CLINIC | Age: 74
End: 2019-09-11

## 2019-09-17 DIAGNOSIS — E78.5 HYPERLIPIDEMIA, UNSPECIFIED HYPERLIPIDEMIA TYPE: ICD-10-CM

## 2019-09-18 RX ORDER — ATORVASTATIN CALCIUM 10 MG/1
TABLET, FILM COATED ORAL
Qty: 90 TABLET | Refills: 1 | Status: SHIPPED | OUTPATIENT
Start: 2019-09-18 | End: 2020-03-18

## 2019-10-04 DIAGNOSIS — I10 ESSENTIAL HYPERTENSION: ICD-10-CM

## 2019-10-05 RX ORDER — TRIAMTERENE AND HYDROCHLOROTHIAZIDE 37.5; 25 MG/1; MG/1
CAPSULE ORAL
Qty: 90 CAPSULE | Refills: 1 | Status: SHIPPED | OUTPATIENT
Start: 2019-10-05 | End: 2020-04-02

## 2019-10-19 DIAGNOSIS — I10 BENIGN ESSENTIAL HYPERTENSION: ICD-10-CM

## 2019-10-21 RX ORDER — POTASSIUM CHLORIDE 750 MG/1
TABLET, EXTENDED RELEASE ORAL
Qty: 180 TABLET | Refills: 1 | Status: SHIPPED | OUTPATIENT
Start: 2019-10-21 | End: 2020-04-17

## 2019-11-26 ENCOUNTER — APPOINTMENT (OUTPATIENT)
Dept: LAB | Facility: CLINIC | Age: 74
End: 2019-11-26
Payer: MEDICARE

## 2019-11-26 DIAGNOSIS — I10 BENIGN ESSENTIAL HYPERTENSION: ICD-10-CM

## 2019-11-26 DIAGNOSIS — E78.5 HYPERLIPIDEMIA, UNSPECIFIED HYPERLIPIDEMIA TYPE: ICD-10-CM

## 2019-11-26 DIAGNOSIS — E11.9 TYPE 2 DIABETES MELLITUS WITHOUT COMPLICATION, WITHOUT LONG-TERM CURRENT USE OF INSULIN (HCC): ICD-10-CM

## 2019-11-26 LAB
ALBUMIN SERPL BCP-MCNC: 3.8 G/DL (ref 3.5–5)
ALP SERPL-CCNC: 84 U/L (ref 46–116)
ALT SERPL W P-5'-P-CCNC: 41 U/L (ref 12–78)
ANION GAP SERPL CALCULATED.3IONS-SCNC: 13 MMOL/L (ref 4–13)
AST SERPL W P-5'-P-CCNC: 21 U/L (ref 5–45)
BASOPHILS # BLD AUTO: 0.05 THOUSANDS/ΜL (ref 0–0.1)
BASOPHILS NFR BLD AUTO: 1 % (ref 0–1)
BILIRUB SERPL-MCNC: 0.44 MG/DL (ref 0.2–1)
BUN SERPL-MCNC: 19 MG/DL (ref 5–25)
CALCIUM SERPL-MCNC: 9.3 MG/DL (ref 8.3–10.1)
CHLORIDE SERPL-SCNC: 104 MMOL/L (ref 100–108)
CHOLEST SERPL-MCNC: 134 MG/DL (ref 50–200)
CO2 SERPL-SCNC: 28 MMOL/L (ref 21–32)
CREAT SERPL-MCNC: 1.01 MG/DL (ref 0.6–1.3)
CREAT UR-MCNC: 219 MG/DL
EOSINOPHIL # BLD AUTO: 0.22 THOUSAND/ΜL (ref 0–0.61)
EOSINOPHIL NFR BLD AUTO: 4 % (ref 0–6)
ERYTHROCYTE [DISTWIDTH] IN BLOOD BY AUTOMATED COUNT: 13.3 % (ref 11.6–15.1)
EST. AVERAGE GLUCOSE BLD GHB EST-MCNC: 160 MG/DL
GFR SERPL CREATININE-BSD FRML MDRD: 55 ML/MIN/1.73SQ M
GLUCOSE P FAST SERPL-MCNC: 140 MG/DL (ref 65–99)
HBA1C MFR BLD: 7.2 % (ref 4.2–6.3)
HCT VFR BLD AUTO: 41.5 % (ref 34.8–46.1)
HDLC SERPL-MCNC: 49 MG/DL
HGB BLD-MCNC: 13.9 G/DL (ref 11.5–15.4)
IMM GRANULOCYTES # BLD AUTO: 0.03 THOUSAND/UL (ref 0–0.2)
IMM GRANULOCYTES NFR BLD AUTO: 1 % (ref 0–2)
LDLC SERPL CALC-MCNC: 54 MG/DL (ref 0–100)
LYMPHOCYTES # BLD AUTO: 1.93 THOUSANDS/ΜL (ref 0.6–4.47)
LYMPHOCYTES NFR BLD AUTO: 31 % (ref 14–44)
MCH RBC QN AUTO: 32 PG (ref 26.8–34.3)
MCHC RBC AUTO-ENTMCNC: 33.5 G/DL (ref 31.4–37.4)
MCV RBC AUTO: 95 FL (ref 82–98)
MICROALBUMIN UR-MCNC: 53 MG/L (ref 0–20)
MICROALBUMIN/CREAT 24H UR: 24 MG/G CREATININE (ref 0–30)
MONOCYTES # BLD AUTO: 0.56 THOUSAND/ΜL (ref 0.17–1.22)
MONOCYTES NFR BLD AUTO: 9 % (ref 4–12)
NEUTROPHILS # BLD AUTO: 3.41 THOUSANDS/ΜL (ref 1.85–7.62)
NEUTS SEG NFR BLD AUTO: 54 % (ref 43–75)
NONHDLC SERPL-MCNC: 85 MG/DL
NRBC BLD AUTO-RTO: 0 /100 WBCS
PLATELET # BLD AUTO: 331 THOUSANDS/UL (ref 149–390)
PMV BLD AUTO: 10.3 FL (ref 8.9–12.7)
POTASSIUM SERPL-SCNC: 3.7 MMOL/L (ref 3.5–5.3)
PROT SERPL-MCNC: 7.5 G/DL (ref 6.4–8.2)
RBC # BLD AUTO: 4.35 MILLION/UL (ref 3.81–5.12)
SODIUM SERPL-SCNC: 145 MMOL/L (ref 136–145)
TRIGL SERPL-MCNC: 156 MG/DL
WBC # BLD AUTO: 6.2 THOUSAND/UL (ref 4.31–10.16)

## 2019-11-26 PROCEDURE — 80061 LIPID PANEL: CPT

## 2019-11-26 PROCEDURE — 82570 ASSAY OF URINE CREATININE: CPT

## 2019-11-26 PROCEDURE — 85025 COMPLETE CBC W/AUTO DIFF WBC: CPT

## 2019-11-26 PROCEDURE — 80053 COMPREHEN METABOLIC PANEL: CPT

## 2019-11-26 PROCEDURE — 36415 COLL VENOUS BLD VENIPUNCTURE: CPT

## 2019-11-26 PROCEDURE — 83036 HEMOGLOBIN GLYCOSYLATED A1C: CPT

## 2019-11-26 PROCEDURE — 82043 UR ALBUMIN QUANTITATIVE: CPT

## 2019-11-29 ENCOUNTER — OFFICE VISIT (OUTPATIENT)
Dept: NEUROLOGY | Facility: CLINIC | Age: 74
End: 2019-11-29
Payer: MEDICARE

## 2019-11-29 VITALS
HEART RATE: 74 BPM | SYSTOLIC BLOOD PRESSURE: 142 MMHG | DIASTOLIC BLOOD PRESSURE: 84 MMHG | BODY MASS INDEX: 38.04 KG/M2 | WEIGHT: 208 LBS

## 2019-11-29 DIAGNOSIS — G44.85 STABBING HEADACHE: ICD-10-CM

## 2019-11-29 DIAGNOSIS — I10 BENIGN ESSENTIAL HYPERTENSION: ICD-10-CM

## 2019-11-29 DIAGNOSIS — G25.81 RESTLESS LEGS SYNDROME: ICD-10-CM

## 2019-11-29 DIAGNOSIS — G47.33 OBSTRUCTIVE SLEEP APNEA: ICD-10-CM

## 2019-11-29 DIAGNOSIS — M54.81 BILATERAL OCCIPITAL NEURALGIA: Primary | ICD-10-CM

## 2019-11-29 PROCEDURE — 99215 OFFICE O/P EST HI 40 MIN: CPT | Performed by: PHYSICIAN ASSISTANT

## 2019-11-29 NOTE — PROGRESS NOTES
Patient ID: Umu Panchal is a 69 y o  female  Assessment/Plan:     Diagnoses and all orders for this visit:    Bilateral occipital neuralgia    Restless legs syndrome    Obstructive sleep apnea    Stabbing headache    Benign essential hypertension          78-year-old female with posttraumatic headaches, mainly bilateral occipital neuralgia  She had relief of most of her headaches on a combination of verapamil and gabapentin  Verapamil helped the most   She decreased verapamil since last seen from 4 doses a day of the 40 mg tab, to 3 doses a day, and she still is doing well  She is not sure if she should continue to wean verapamil so that she can go back on amlodipine as this was used for hypertension in the past   I am okay with her staying on the verapamil for hypertension but her family physician may have a different opinion  She may want her to go back on amlodipine  If this is the case she can continue to slowly wean the verapamil and transition to amlodipine  She will discuss this with her family doctor at the next appointment  For now continue verapamil as dosed  If headaches worsen as she is weaning she will just go back on the lowest effective dose of verapamil  If her PCP feels that verapamil is adequate for treating hypertension then she should stay on it for HA prevention  Continue gabapentin q h s  For restless leg, and possibly also preventing HAs  Continue CPAP every night  The patient should not hesitate to call me prior to her follow up with any questions or concerns  The patient was instructed to urgently call 911 or present to the nearest emergency room with any new or worsening neurological deficits  Subjective:    HPI    Ms Umu Panchal is a very pleasant 73 yo female here for headache f/u      She is doing very well on verapamil and gabapentin, denies s/e to both   She had no more HA relief with the IR verapamil than the extended release verapamil    She currently takes 40 mg TID (weaned since last seen from: 40 mg qAM, 80 mg qPM (around 2/3pm) and 40 mg qHS)  She is almost headache-free  When she gets a headache in the frontal or occipital region is barely noticeable and she does not take anything for it      She does not get any severe HAs as she did prior to starting verapamil      She will use caffeine, Excedrin migraine, aleve or tylenol prn headache which works great  She does not take more than 2-3 analgesics per week in total      Denies sleep issues, continues with CPAP      Past history:  Tried: Aleve, Excedrin migraine- works best, gabapentin (higher dose may have caused ankle swelling, improved when she decreased the dose), verapamil-finds this very effective at the current dose:  40 q a m /80 q p m (3-4 pm)  /40 q h s      The patient has had a right occipital nerve block with Dr Alycia Mccarty in pain management  She felt that it was very effective for her occipital headache, but she continues to have pain in the entire temporo-parietal region on the right side, which is dull in character and not a/w n/v or light or sound sensitivity  Sometimes she gets a sharp, stabbing and transient pain above the right eye        She has 2nd injection recently which resolved the headaches, and now she only gets a dull headache every afternoon for which she does not take anything, and when it becomes very severe she uses the Tylenol with efficacy   She denies any side effects to her current medication regimen for HA      She also stopped amlodipine since she is on verapamil and this dual therapy seemed to worsen distal LE swelling  BP has been well controlled      H/o HTN and HLD, well controlled with medication  She has sleep apnea and is following with sleep specialist  On CPAP uses it compliantly (nasal cannula)  history of aneurysm in sister- currently being watched  family history of strokes in both parents (does not know if ischemic or hemorrhagic)   No history of heart disease, kidney disease, blood clots  The following portions of the patient's history were reviewed and updated as appropriate:   She  has a past medical history of Abnormal mammogram of right breast (06/29/2017), Acute medial meniscal tear, left, initial encounter (06/29/2017), Arthritis, Generalized anxiety disorder (08/31/2017), GERD (gastroesophageal reflux disease), Herpes zoster (09/26/2016), Hyperlipidemia, Hypertension, Hypokalemia, Left medial knee pain, Localized osteoarthritis of left knee (03/15/2016), Post-traumatic headache (09/19/2017), and Restless leg syndrome  She   Patient Active Problem List    Diagnosis Date Noted    Morbid obesity ([facility] ) 05/22/2019    Left hip pain 03/07/2019    Chronic fatigue 12/20/2018    Pain of both hip joints 12/20/2018    Obstructive sleep apnea 10/19/2018    Dry mouth 10/19/2018    Screening for osteoporosis 08/16/2018    Colon polyps 03/22/2018    Diverticulosis 03/22/2018    Peripheral edema 03/20/2018    Stabbing headache 02/19/2018    Type 2 diabetes mellitus without complication ([facility] ) 28/87/7554    Healthcare maintenance 02/01/2018    Neck pain 10/23/2017    Bilateral occipital neuralgia 09/19/2017    Breast nodule 02/15/2017    Generalized anxiety disorder 09/26/2016    Bilateral hearing loss 08/05/2016    Left medial knee pain     Primary localized osteoarthritis of left knee 03/15/2016    Restless legs syndrome 10/01/2015    Esophageal reflux 06/04/2013    Hyperlipidemia 02/04/2013    Benign essential hypertension 06/27/2012     She  has a past surgical history that includes Squamous cell carcinoma excision; Cholecystectomy; Ankle fracture surgery (Right); ARTHROSCOPY KNEE (Left, 5/26/2016); Hysterectomy; and Oophorectomy    Her family history includes Cerebral aneurysm in her sister; Diabetes in her other; Hypertension in her other; No Known Problems in her daughter, maternal aunt, maternal aunt, maternal aunt, and sister; Stroke in her father and mother; Uterine cancer (age of onset: 54) in her sister  She  reports that she has never smoked  She has never used smokeless tobacco  She reports that she drinks alcohol  She reports that she does not use drugs  Current Outpatient Medications   Medication Sig Dispense Refill    aspirin 81 MG tablet Take 81 mg by mouth daily   atorvastatin (LIPITOR) 10 mg tablet TAKE 1 TABLET BY MOUTH EVERY DAY 90 tablet 1    brimonidine tartrate 0 2 % ophthalmic solution INSTILL 1 DROP INTO BOTH EYES TWICE A DAY  3    brinzolamide (AZOPT) 1 % ophthalmic suspension Administer 2 drops to both eyes 2 (two) times a day      fluorouracil (EFUDEX) 5 % cream Apply 1 application topically daily  1    gabapentin (NEURONTIN) 100 mg capsule Take 1 capsule (100 mg total) by mouth daily at bedtime 90 capsule 0    KLOR-CON M10 10 MEQ tablet TAKE 1 TABLET (10 MEQ TOTAL) BY MOUTH 2 (TWO) TIMES A DAY FOR 90 DAYS 180 tablet 1    latanoprost (XALATAN) 0 005 % ophthalmic solution Administer 1 drop to both eyes daily at bedtime  6    losartan (COZAAR) 50 mg tablet TAKE 1 TABLET BY MOUTH EVERY DAY 90 tablet 1    LUMIGAN 0 01 % ophthalmic drops INSTILL 1 DROP INTO BOTH EYES EVERY DAY AT NIGHT  3    omeprazole (PriLOSEC) 20 mg delayed release capsule TAKE 1 CAPSULE BY MOUTH EVERY DAY 90 capsule 1    potassium chloride (K-DUR,KLOR-CON) 10 mEq tablet       timolol (TIMOPTIC) 0 5 % ophthalmic solution INSTILL 1 DROP INTO BOTH EYES IN THE MORNING  3    triamterene-hydrochlorothiazide (DYAZIDE) 37 5-25 mg per capsule TAKE 1 CAPSULE BY MOUTH EVERY DAY 90 capsule 1    verapamil (CALAN) 40 mg tablet TAKE 1 TABLET BY MOUTH EVERY MORNING , 2 TABLETS EVERY EVENING TAKE 1 TABLET AT BEDTIME 360 tablet 1     No current facility-administered medications for this visit  She is allergic to penicillins            Objective:    Blood pressure 142/84, pulse 74, weight 94 3 kg (208 lb), not currently breastfeeding      Physical Exam    Neurological Exam  Vital signs reviewed  Well developed, well nourished  Mood and affect very pleasant  Speech is fluent and articulate  Head: Normocephalic, atraumatic  Neck: Neck flexors 5/5, No TTP    CN 2-12: intact and symmetric, including EOMs which are normal b/l and PERRL  MSK: 5/5 t/o  ROM normal x all 4 extr  Sensation: Inact to LT x4 extr  Reflexes: 2+ and symmetric in all 4 extr  Coordination: Nml x4 extr  Gait: Steady normal gait      This neurological exam is unchanged since the last visit  ROS:    Review of Systems   Constitutional: Negative  Negative for appetite change and fever  HENT: Negative  Negative for hearing loss, tinnitus, trouble swallowing and voice change  Eyes: Negative  Negative for photophobia and pain  Respiratory: Negative  Negative for shortness of breath  Cardiovascular: Negative  Negative for palpitations  Gastrointestinal: Negative  Negative for nausea and vomiting  Endocrine: Negative  Negative for cold intolerance and heat intolerance  Genitourinary: Negative  Negative for dysuria, frequency and urgency  Musculoskeletal: Negative  Negative for myalgias and neck pain  Skin: Negative  Negative for rash  Neurological: Negative  Negative for dizziness, tremors, seizures, syncope, facial asymmetry, speech difficulty, weakness, light-headedness, numbness and headaches  Hematological: Negative  Does not bruise/bleed easily  Psychiatric/Behavioral: Negative  Negative for confusion, hallucinations and sleep disturbance  The following portions of the patient's history were reviewed and updated as appropriate: allergies, current medications/ medication history, past family history, past medical history, past social history, past surgical history and problem list     Review of systems was reviewed and otherwise unremarkable from a neurological perspective

## 2019-12-05 ENCOUNTER — OFFICE VISIT (OUTPATIENT)
Dept: FAMILY MEDICINE CLINIC | Facility: CLINIC | Age: 74
End: 2019-12-05

## 2019-12-05 VITALS
HEIGHT: 62 IN | TEMPERATURE: 98.2 F | SYSTOLIC BLOOD PRESSURE: 120 MMHG | RESPIRATION RATE: 16 BRPM | HEART RATE: 78 BPM | BODY MASS INDEX: 38.61 KG/M2 | DIASTOLIC BLOOD PRESSURE: 70 MMHG | WEIGHT: 209.8 LBS

## 2019-12-05 DIAGNOSIS — E78.5 HYPERLIPIDEMIA, UNSPECIFIED HYPERLIPIDEMIA TYPE: ICD-10-CM

## 2019-12-05 DIAGNOSIS — Z00.00 HEALTHCARE MAINTENANCE: ICD-10-CM

## 2019-12-05 DIAGNOSIS — L98.9 SKIN LESION OF SCALP: ICD-10-CM

## 2019-12-05 DIAGNOSIS — Z13.820 SCREENING FOR OSTEOPOROSIS: ICD-10-CM

## 2019-12-05 DIAGNOSIS — E11.9 TYPE 2 DIABETES MELLITUS WITHOUT COMPLICATION, WITHOUT LONG-TERM CURRENT USE OF INSULIN (HCC): ICD-10-CM

## 2019-12-05 DIAGNOSIS — I10 BENIGN ESSENTIAL HYPERTENSION: Primary | ICD-10-CM

## 2019-12-05 PROCEDURE — 99214 OFFICE O/P EST MOD 30 MIN: CPT | Performed by: FAMILY MEDICINE

## 2019-12-05 NOTE — PROGRESS NOTES
Rashi Finn 1945 female MRN: [id number]    Family Medicine Follow-up Visit    ASSESSMENT/PLAN  Benign essential hypertension    Hypertension- patients hypertension is controlled today with a Blood pressure of Blood Pressure: 120/70     on current medications  Continue current medications  Discussed DASH diet and exercise  Healthcare maintenance  Suggest AWV  Ordered DXA    Type 2 diabetes mellitus without complication ([facility] )    Lab Results   Component Value Date    HGBA1C 7 2 (H) 11/26/2019   start metformin as she is currently not on medications  500mg daily and recheck in 3 months  Advised on SE of bloating/diarrhea  If this is not tolerable for the holiday season she can start in [facility]  Skin lesion of scalp  Verrucous like lesion on right occiptal region - has appt with dermatology this week  Screening for osteoporosis  Reordered dxa      Future Appointments   Date Time Provider Jasen Joshua   2/19/2020 11:00 AM Jessica Ochoa MD [facility]   3/20/2020  2:30 PM Lennox Gold, PA-C [facility]   6/5/2020 11:40 AM [facility]          SUBJECTIVE  CC: No chief complaint on file  HPI:  Rashi Finn is a 69 y o  female who presents for  Saw the neurologist and she was taking 4 pills/day and was weaning off but had one high bp and was worried that was from the weaning of the verapamil    Ankle pain that is managed by podiatry    Still concerned about weight - we discussed healthy eating and making activity and movement her goal rather than focusing on weight  Does have a new skin lesion on back of head that is bothersome to her but she already made a derm appt  Review of Systems   Constitutional: Negative for activity change, chills, fever and unexpected weight change  HENT: Negative for congestion  Eyes: Negative for visual disturbance  Respiratory: Negative for cough, chest tightness, shortness of breath and wheezing  Cardiovascular: Negative for chest pain  Gastrointestinal: Negative for abdominal pain, diarrhea and nausea  Endocrine: Negative for cold intolerance and heat intolerance  Musculoskeletal: Negative for arthralgias and myalgias  Skin: Negative for color change  Neurological: Negative for dizziness  Psychiatric/Behavioral: Negative for agitation, dysphoric mood and sleep disturbance         Historical Information   The patient history was reviewed as follows:    Past Medical History:   Diagnosis Date    Abnormal mammogram of right breast 06/29/2017    Acute medial meniscal tear, left, initial encounter 06/29/2017    Arthritis     Generalized anxiety disorder 08/31/2017    GERD (gastroesophageal reflux disease)     Herpes zoster 09/26/2016    Hyperlipidemia     Hypertension     Hypokalemia     Left medial knee pain     Localized osteoarthritis of left knee 03/15/2016    Post-traumatic headache 09/19/2017    Restless leg syndrome      Past Surgical History:   Procedure Laterality Date    ANKLE FRACTURE SURGERY Right     ARTHROSCOPY KNEE Left 5/26/2016    Procedure: ARTHROSCOPY KNEE, PARTILA MEDIAL MENISECTOMY;  Surgeon: Jamar Logan MD;  [facility];  Service:    Javon Esparza CHOLECYSTECTOMY      HYSTERECTOMY      65    OOPHORECTOMY      65    SQUAMOUS CELL CARCINOMA EXCISION      forehead     Family History   Problem Relation Age of Onset    Stroke Mother     Stroke Father     Hypertension Other     Diabetes Other     Cerebral aneurysm Sister     Uterine cancer Sister 54    No Known Problems Daughter     No Known Problems Sister     No Known Problems Maternal Aunt     No Known Problems Maternal Aunt     No Known Problems Maternal Aunt       Social History   Social History     Substance and Sexual Activity   Alcohol Use Yes    Comment: rare     Social History     Substance and Sexual Activity   Drug Use No     Social History     Tobacco Use   Smoking Status Never Smoker Smokeless Tobacco Never Used       Medications:     Current Outpatient Medications:     aspirin 81 MG tablet, Take 81 mg by mouth daily  , Disp: , Rfl:     atorvastatin (LIPITOR) 10 mg tablet, TAKE 1 TABLET BY MOUTH EVERY DAY, Disp: 90 tablet, Rfl: 1    brinzolamide (AZOPT) 1 % ophthalmic suspension, Administer 2 drops to both eyes 2 (two) times a day, Disp: , Rfl:     fluorouracil (EFUDEX) 5 % cream, Apply 1 application topically daily, Disp: , Rfl: 1    gabapentin (NEURONTIN) 100 mg capsule, Take 1 capsule (100 mg total) by mouth daily at bedtime (Patient taking differently: Take 400 mg by mouth daily at bedtime ), Disp: 90 capsule, Rfl: 0    KLOR-CON M10 10 MEQ tablet, TAKE 1 TABLET (10 MEQ TOTAL) BY MOUTH 2 (TWO) TIMES A DAY FOR 90 DAYS, Disp: 180 tablet, Rfl: 1    losartan (COZAAR) 50 mg tablet, TAKE 1 TABLET BY MOUTH EVERY DAY, Disp: 90 tablet, Rfl: 1    omeprazole (PriLOSEC) 20 mg delayed release capsule, TAKE 1 CAPSULE BY MOUTH EVERY DAY, Disp: 90 capsule, Rfl: 1    timolol (TIMOPTIC) 0 5 % ophthalmic solution, INSTILL 1 DROP INTO BOTH EYES IN THE MORNING, Disp: , Rfl: 3    triamterene-hydrochlorothiazide (DYAZIDE) 37 5-25 mg per capsule, TAKE 1 CAPSULE BY MOUTH EVERY DAY, Disp: 90 capsule, Rfl: 1    verapamil (CALAN) 40 mg tablet, TAKE 1 TABLET BY MOUTH EVERY MORNING , 2 TABLETS EVERY EVENING TAKE 1 TABLET AT BEDTIME, Disp: 360 tablet, Rfl: 1    metFORMIN (GLUCOPHAGE) 500 mg tablet, Take 1 tablet (500 mg total) by mouth 2 (two) times a day with meals, Disp: 30 tablet, Rfl: 5  Allergies   Allergen Reactions    Penicillins Rash     itching       OBJECTIVE    Vitals:   Vitals:    12/05/19 1111   BP: 120/70   Pulse: 78   Resp: 16   Temp: 98 2 °F (36 8 °C)   Weight: 95 2 kg (209 lb 12 8 oz)   Height: 5' 2" (1 575 m)           Physical Exam   Constitutional: She is oriented to person, place, and time  She appears well-developed and well-nourished  HENT:   Head: Normocephalic and atraumatic  Cardiovascular: Normal rate, regular rhythm and normal heart sounds  Pulmonary/Chest: Effort normal and breath sounds normal    Abdominal: Soft  Bowel sounds are normal    Musculoskeletal: Normal range of motion  Neurological: She is alert and oriented to person, place, and time  Skin: Skin is warm and dry  1-2cm round, verrucous like lesion on right occiptal scalp  Slightly tender  See clinical images as well  Psychiatric: She has a normal mood and affect  Her behavior is normal  Judgment normal    Nursing note and vitals reviewed

## 2019-12-09 ENCOUNTER — TELEPHONE (OUTPATIENT)
Dept: FAMILY MEDICINE CLINIC | Facility: CLINIC | Age: 74
End: 2019-12-09

## 2019-12-09 PROBLEM — L98.9 SKIN LESION OF SCALP: Status: ACTIVE | Noted: 2019-12-09

## 2019-12-09 NOTE — TELEPHONE ENCOUNTER
Patient calling, she thought she was to start Metformin 500mg daily, prescription came thru 500mg Bid  Please clarify  Thanks!

## 2019-12-09 NOTE — ASSESSMENT & PLAN NOTE
Lab Results   Component Value Date    HGBA1C 7 2 (H) 11/26/2019   start metformin as she is currently not on medications  500mg daily and recheck in 3 months  Advised on SE of bloating/diarrhea  If this is not tolerable for the holiday season she can start in [facility]

## 2020-01-03 DIAGNOSIS — I10 HYPERTENSION, UNSPECIFIED TYPE: ICD-10-CM

## 2020-01-04 RX ORDER — LOSARTAN POTASSIUM 50 MG/1
TABLET ORAL
Qty: 90 TABLET | Refills: 0 | Status: SHIPPED | OUTPATIENT
Start: 2020-01-04 | End: 2020-03-30

## 2020-01-18 DIAGNOSIS — G44.85 STABBING HEADACHE: ICD-10-CM

## 2020-01-18 DIAGNOSIS — M54.81 BILATERAL OCCIPITAL NEURALGIA: ICD-10-CM

## 2020-01-20 RX ORDER — VERAPAMIL HYDROCHLORIDE 40 MG/1
TABLET ORAL
Qty: 270 TABLET | Refills: 1 | Status: SHIPPED | OUTPATIENT
Start: 2020-01-20 | End: 2020-07-20

## 2020-01-27 DIAGNOSIS — G25.81 RESTLESS LEGS SYNDROME: ICD-10-CM

## 2020-01-27 DIAGNOSIS — K21.9 GASTROESOPHAGEAL REFLUX DISEASE WITHOUT ESOPHAGITIS: ICD-10-CM

## 2020-01-27 RX ORDER — OMEPRAZOLE 20 MG/1
CAPSULE, DELAYED RELEASE ORAL
Qty: 90 CAPSULE | Refills: 0 | Status: SHIPPED | OUTPATIENT
Start: 2020-01-27 | End: 2020-04-22

## 2020-01-27 RX ORDER — GABAPENTIN 300 MG/1
CAPSULE ORAL
Qty: 90 CAPSULE | Refills: 0 | Status: SHIPPED | OUTPATIENT
Start: 2020-01-27 | End: 2020-04-20

## 2020-02-04 DIAGNOSIS — G25.81 RESTLESS LEGS SYNDROME: ICD-10-CM

## 2020-02-04 RX ORDER — GABAPENTIN 100 MG/1
CAPSULE ORAL
Qty: 90 CAPSULE | Refills: 0 | Status: SHIPPED | OUTPATIENT
Start: 2020-02-04 | End: 2020-04-28 | Stop reason: SDUPTHER

## 2020-02-19 ENCOUNTER — ANNUAL EXAM (OUTPATIENT)
Dept: OBGYN CLINIC | Facility: CLINIC | Age: 75
End: 2020-02-19
Payer: MEDICARE

## 2020-02-19 VITALS
HEIGHT: 63 IN | WEIGHT: 209.4 LBS | SYSTOLIC BLOOD PRESSURE: 142 MMHG | BODY MASS INDEX: 37.1 KG/M2 | DIASTOLIC BLOOD PRESSURE: 84 MMHG

## 2020-02-19 DIAGNOSIS — Z12.31 ENCOUNTER FOR SCREENING MAMMOGRAM FOR BREAST CANCER: Primary | ICD-10-CM

## 2020-02-19 DIAGNOSIS — Z01.419 WOMEN'S ANNUAL ROUTINE GYNECOLOGICAL EXAMINATION: ICD-10-CM

## 2020-02-19 PROCEDURE — G0101 CA SCREEN;PELVIC/BREAST EXAM: HCPCS | Performed by: OBSTETRICS & GYNECOLOGY

## 2020-02-19 NOTE — PATIENT INSTRUCTIONS
The patient was informed of a stable menopausal gyn examination  A Pap smear was not performed  She is no longer sexually active  There is no gyn complaints or issues  She return my offi  She should return my office on a p r n  Basis  ce on a p r n  Basis only  She still needs to make arrangements for annual manual g  She also need to  make sure her colonoscopies are up-to-date   she should return my office on a p r n  Basis

## 2020-02-19 NOTE — PROGRESS NOTES
Assessment/Plan:    The patient was informed of a stable menopausal gyn examination  A Pap smear was not performed  The bladder is well supported  There is no prolapse  The cervix uterus is surgically removed the adnexa clear  She will continue tried exercise and maintain her weight  She will continue get her yearly mammograms  Colonoscopy was last year  She return my office on a p r n  Basis only  Subjective:      Patient ID: Anu Denney is a 69 y o  female  HPI  This is a 80-year-old white female  She is a  2 para 2 with 2 prior vaginal deliveries  She has been menopausal for many years  She is many years status post total abdominal hysterectomy and BSO for fibroids  She is no longer sexually active  Does admit to slight stress urine incontinence this is not interfering with her lifestyle  She is not happy with her weight  She has a dentist on a regular basis  She states she had a colonoscopy within the last 2 years  There are no new major family illnesses to complain with the present time  Currently she has been treated for hypertension, reflux, skin cancer, and restless leg syndrome  She denies any problem with depression  She is not happy with her weight gain  She has a dentist on a regular basis  The following portions of the patient's history were reviewed and updated as appropriate: allergies, current medications, past family history, past medical history, past social history, past surgical history and problem list     Review of Systems   All other systems reviewed and are negative  Objective:      /84   Ht 5' 2 5" (1 588 m)   Wt 95 kg (209 lb 6 4 oz)   BMI 37 69 kg/m²          Physical Exam   Constitutional: She is oriented to person, place, and time  She appears well-developed and well-nourished  HENT:   Head: Normocephalic and atraumatic  Neck: Normal range of motion  Neck supple  No tracheal deviation present  No thyromegaly present  Cardiovascular: Normal rate, regular rhythm, normal heart sounds and intact distal pulses  Exam reveals no friction rub  No murmur heard  Pulmonary/Chest: Effort normal and breath sounds normal  No stridor  No respiratory distress  She has no wheezes  She has no rales  She exhibits no tenderness  Right breast exhibits no inverted nipple, no mass, no nipple discharge, no skin change and no tenderness  Left breast exhibits no inverted nipple, no mass, no nipple discharge, no skin change and no tenderness  No breast swelling, tenderness, discharge or bleeding  Breasts are symmetrical    Abdominal: Soft  Bowel sounds are normal  She exhibits no distension and no mass  There is no tenderness  There is no rebound and no guarding  No hernia  Hernia confirmed negative in the right inguinal area and confirmed negative in the left inguinal area  Genitourinary: Vagina normal  No labial fusion  There is no rash, tenderness, lesion or injury on the right labia  There is no rash, tenderness, lesion or injury on the left labia  Right adnexum displays no mass, no tenderness and no fullness  Left adnexum displays no mass, no tenderness and no fullness  No erythema, tenderness or bleeding in the vagina  No foreign body in the vagina  No signs of injury around the vagina  No vaginal discharge found  Genitourinary Comments: The external genitalia within normal limits, the meatus is clean healthy without evidence of irritation or discharge or infection  The bladder is well supported there is no prolapse  The cervix the uterus and adnexa surgically removed  The vaginal vault well supported  There is no problem with atrophic changes noted at this time  The rectum is clear in appearance there is no evidence of hemorrhoid  Musculoskeletal: Normal range of motion  Lymphadenopathy:     She has no cervical adenopathy  No inguinal adenopathy noted on the right or left side     Neurological: She is oriented to person, place, and time  Skin: Skin is warm and dry  Psychiatric: She has a normal mood and affect   Her behavior is normal  Thought content normal

## 2020-02-28 ENCOUNTER — TELEPHONE (OUTPATIENT)
Dept: FAMILY MEDICINE CLINIC | Facility: CLINIC | Age: 75
End: 2020-02-28

## 2020-03-02 ENCOUNTER — TELEPHONE (OUTPATIENT)
Dept: FAMILY MEDICINE CLINIC | Facility: CLINIC | Age: 75
End: 2020-03-02

## 2020-03-05 ENCOUNTER — APPOINTMENT (OUTPATIENT)
Dept: LAB | Facility: CLINIC | Age: 75
End: 2020-03-05
Payer: MEDICARE

## 2020-03-05 DIAGNOSIS — E78.5 HYPERLIPIDEMIA, UNSPECIFIED HYPERLIPIDEMIA TYPE: ICD-10-CM

## 2020-03-05 DIAGNOSIS — E11.9 TYPE 2 DIABETES MELLITUS WITHOUT COMPLICATION, WITHOUT LONG-TERM CURRENT USE OF INSULIN (HCC): ICD-10-CM

## 2020-03-05 LAB
ALBUMIN SERPL BCP-MCNC: 3.6 G/DL (ref 3.5–5)
ALP SERPL-CCNC: 81 U/L (ref 46–116)
ALT SERPL W P-5'-P-CCNC: 39 U/L (ref 12–78)
ANION GAP SERPL CALCULATED.3IONS-SCNC: 10 MMOL/L (ref 4–13)
AST SERPL W P-5'-P-CCNC: 26 U/L (ref 5–45)
BILIRUB SERPL-MCNC: 0.48 MG/DL (ref 0.2–1)
BUN SERPL-MCNC: 19 MG/DL (ref 5–25)
CALCIUM SERPL-MCNC: 9.2 MG/DL (ref 8.3–10.1)
CHLORIDE SERPL-SCNC: 101 MMOL/L (ref 100–108)
CHOLEST SERPL-MCNC: 126 MG/DL (ref 50–200)
CO2 SERPL-SCNC: 30 MMOL/L (ref 21–32)
CREAT SERPL-MCNC: 1.01 MG/DL (ref 0.6–1.3)
CREAT UR-MCNC: 124 MG/DL
EST. AVERAGE GLUCOSE BLD GHB EST-MCNC: 151 MG/DL
GFR SERPL CREATININE-BSD FRML MDRD: 55 ML/MIN/1.73SQ M
GLUCOSE P FAST SERPL-MCNC: 149 MG/DL (ref 65–99)
HBA1C MFR BLD: 6.9 %
HDLC SERPL-MCNC: 46 MG/DL
LDLC SERPL CALC-MCNC: 49 MG/DL (ref 0–100)
MICROALBUMIN UR-MCNC: 28.2 MG/L (ref 0–20)
MICROALBUMIN/CREAT 24H UR: 23 MG/G CREATININE (ref 0–30)
NONHDLC SERPL-MCNC: 80 MG/DL
POTASSIUM SERPL-SCNC: 4.1 MMOL/L (ref 3.5–5.3)
PROT SERPL-MCNC: 7.3 G/DL (ref 6.4–8.2)
SODIUM SERPL-SCNC: 141 MMOL/L (ref 136–145)
TRIGL SERPL-MCNC: 157 MG/DL

## 2020-03-05 PROCEDURE — 80053 COMPREHEN METABOLIC PANEL: CPT

## 2020-03-05 PROCEDURE — 82570 ASSAY OF URINE CREATININE: CPT

## 2020-03-05 PROCEDURE — 80061 LIPID PANEL: CPT

## 2020-03-05 PROCEDURE — 83036 HEMOGLOBIN GLYCOSYLATED A1C: CPT

## 2020-03-05 PROCEDURE — 36415 COLL VENOUS BLD VENIPUNCTURE: CPT

## 2020-03-05 PROCEDURE — 82043 UR ALBUMIN QUANTITATIVE: CPT

## 2020-03-09 DIAGNOSIS — E11.9 TYPE 2 DIABETES MELLITUS WITHOUT COMPLICATION, WITHOUT LONG-TERM CURRENT USE OF INSULIN (HCC): ICD-10-CM

## 2020-03-12 ENCOUNTER — OFFICE VISIT (OUTPATIENT)
Dept: FAMILY MEDICINE CLINIC | Facility: CLINIC | Age: 75
End: 2020-03-12

## 2020-03-12 VITALS
WEIGHT: 209.4 LBS | RESPIRATION RATE: 18 BRPM | HEIGHT: 63 IN | HEART RATE: 80 BPM | DIASTOLIC BLOOD PRESSURE: 80 MMHG | BODY MASS INDEX: 37.1 KG/M2 | SYSTOLIC BLOOD PRESSURE: 110 MMHG | TEMPERATURE: 98 F

## 2020-03-12 DIAGNOSIS — E78.5 HYPERLIPIDEMIA, UNSPECIFIED HYPERLIPIDEMIA TYPE: ICD-10-CM

## 2020-03-12 DIAGNOSIS — E11.9 TYPE 2 DIABETES MELLITUS WITHOUT COMPLICATION, WITHOUT LONG-TERM CURRENT USE OF INSULIN (HCC): ICD-10-CM

## 2020-03-12 DIAGNOSIS — F41.1 GENERALIZED ANXIETY DISORDER: ICD-10-CM

## 2020-03-12 DIAGNOSIS — Z00.00 MEDICARE ANNUAL WELLNESS VISIT, INITIAL: Primary | ICD-10-CM

## 2020-03-12 DIAGNOSIS — Z12.11 SCREENING FOR COLON CANCER: ICD-10-CM

## 2020-03-12 DIAGNOSIS — E66.01 MORBID OBESITY (HCC): ICD-10-CM

## 2020-03-12 DIAGNOSIS — G47.33 OSA AND COPD OVERLAP SYNDROME (HCC): ICD-10-CM

## 2020-03-12 DIAGNOSIS — J44.9 OSA AND COPD OVERLAP SYNDROME (HCC): ICD-10-CM

## 2020-03-12 DIAGNOSIS — I10 BENIGN ESSENTIAL HYPERTENSION: ICD-10-CM

## 2020-03-12 PROCEDURE — 99214 OFFICE O/P EST MOD 30 MIN: CPT | Performed by: FAMILY MEDICINE

## 2020-03-12 PROCEDURE — 1170F FXNL STATUS ASSESSED: CPT | Performed by: FAMILY MEDICINE

## 2020-03-12 PROCEDURE — 3044F HG A1C LEVEL LT 7.0%: CPT | Performed by: FAMILY MEDICINE

## 2020-03-12 PROCEDURE — 1160F RVW MEDS BY RX/DR IN RCRD: CPT | Performed by: FAMILY MEDICINE

## 2020-03-12 PROCEDURE — 1125F AMNT PAIN NOTED PAIN PRSNT: CPT | Performed by: FAMILY MEDICINE

## 2020-03-12 PROCEDURE — 3079F DIAST BP 80-89 MM HG: CPT | Performed by: FAMILY MEDICINE

## 2020-03-12 PROCEDURE — 3060F POS MICROALBUMINURIA REV: CPT | Performed by: FAMILY MEDICINE

## 2020-03-12 PROCEDURE — 1036F TOBACCO NON-USER: CPT | Performed by: FAMILY MEDICINE

## 2020-03-12 PROCEDURE — 3074F SYST BP LT 130 MM HG: CPT | Performed by: FAMILY MEDICINE

## 2020-03-12 PROCEDURE — 1123F ACP DISCUSS/DSCN MKR DOCD: CPT | Performed by: FAMILY MEDICINE

## 2020-03-12 PROCEDURE — 4040F PNEUMOC VAC/ADMIN/RCVD: CPT | Performed by: FAMILY MEDICINE

## 2020-03-12 PROCEDURE — G0438 PPPS, INITIAL VISIT: HCPCS | Performed by: FAMILY MEDICINE

## 2020-03-12 RX ORDER — BRIMONIDINE TARTRATE 2 MG/ML
SOLUTION/ DROPS OPHTHALMIC
COMMUNITY
Start: 2019-12-19 | End: 2021-03-18

## 2020-03-12 NOTE — ASSESSMENT & PLAN NOTE
Hyperlipidemia - 77 y  o female being evaluated for HLD  No myalgias  Continue medication    Last LDL   Lab Results   Component Value Date    LDLCALC 49 03/05/2020

## 2020-03-12 NOTE — PROGRESS NOTES
Assessment and Plan:     Problem List Items Addressed This Visit     None      Visit Diagnoses     Screening for colon cancer    -  Primary           Preventive health issues were discussed with patient, and age appropriate screening tests were ordered as noted in patient's After Visit Summary  Personalized health advice and appropriate referrals for health education or preventive services given if needed, as noted in patient's After Visit Summary       History of Present Illness:     Patient presents for Medicare Annual Wellness visit    Patient Care Team:  Donna Palencia as PCP - General (Family Medicine)  Mayra Jones MD Charm Buttery, MD     Problem List:     Patient Active Problem List   Diagnosis    Left medial knee pain    Benign essential hypertension    Bilateral hearing loss    Bilateral occipital neuralgia    Breast nodule    Neck pain    Esophageal reflux    Generalized anxiety disorder    Hyperlipidemia    Primary localized osteoarthritis of left knee    Restless legs syndrome    Type 2 diabetes mellitus without complication ([address] )    Healthcare maintenance    Stabbing headache    Peripheral edema    Colon polyps    Diverticulosis    Screening for osteoporosis    Obstructive sleep apnea    Dry mouth    Chronic fatigue    Pain of both hip joints    Left hip pain    Morbid obesity ([address] )    Skin lesion of scalp      Past Medical and Surgical History:     Past Medical History:   Diagnosis Date    Abnormal mammogram of right breast 06/29/2017    Acute medial meniscal tear, left, initial encounter 06/29/2017    Arthritis     Generalized anxiety disorder 08/31/2017    GERD (gastroesophageal reflux disease)     Herpes zoster 09/26/2016    Hyperlipidemia     Hypertension     Hypokalemia     Left medial knee pain     Localized osteoarthritis of left knee 03/15/2016    Post-traumatic headache 09/19/2017    Restless leg syndrome      Past Surgical History:   Procedure Laterality Date    ANKLE FRACTURE SURGERY Right     ARTHROSCOPY KNEE Left 5/26/2016    Procedure: ARTHROSCOPY KNEE, PARTILA MEDIAL MENISECTOMY;  Surgeon: Darling Erazo MD;  Location: [facility];  Service:    [facility] CHOLECYSTECTOMY      HYSTERECTOMY      65    OOPHORECTOMY      65    SQUAMOUS CELL CARCINOMA EXCISION      forehead      Family History:     Family History   Problem Relation Age of Onset    Stroke Mother     Stroke Father     Hypertension Other     Diabetes Other     Cerebral aneurysm Sister     Uterine cancer Sister 54    No Known Problems Daughter     No Known Problems Sister     No Known Problems Maternal Aunt     No Known Problems Maternal Aunt     No Known Problems Maternal Aunt       Social History:        Social History     Socioeconomic History    Marital status: /Civil Union     Spouse name: Not on file    Number of children: Not on file    Years of education: Not on file    Highest education level: Not on file   Occupational History    Not on file   Social Needs    Financial resource strain: Not on file    Food insecurity:     Worry: Not on file     Inability: Not on file    Transportation needs:     Medical: Not on file     Non-medical: Not on file   Tobacco Use    Smoking status: Never Smoker    Smokeless tobacco: Never Used   Substance and Sexual Activity    Alcohol use: Yes     Comment: rare    Drug use: No    Sexual activity: Not Currently   Lifestyle    Physical activity:     Days per week: Not on file     Minutes per session: Not on file    Stress: Not on file   Relationships    Social connections:     Talks on phone: Not on file     Gets together: Not on file     Attends Gnosticist service: Not on file     Active member of club or organization: Not on file     Attends meetings of clubs or organizations: Not on file     Relationship status: Not on file    Intimate partner violence:     Fear of current or ex partner: Not on file     Emotionally abused: Not on file     Physically abused: Not on file     Forced sexual activity: Not on file   Other Topics Concern    Not on file   Social History Narrative    Not on file      Medications and Allergies:     Current Outpatient Medications   Medication Sig Dispense Refill    aspirin 81 MG tablet Take 81 mg by mouth daily   atorvastatin (LIPITOR) 10 mg tablet TAKE 1 TABLET BY MOUTH EVERY DAY 90 tablet 1    brinzolamide (AZOPT) 1 % ophthalmic suspension Administer 2 drops to both eyes 2 (two) times a day      fluorouracil (EFUDEX) 5 % cream Apply 1 application topically daily  1    gabapentin (NEURONTIN) 100 mg capsule TAKE 1 CAPSULE BY MOUTH AT BEDTIME 90 capsule 0    gabapentin (NEURONTIN) 300 mg capsule TAKE 1 CAPSULE BY MOUTH HALF HOUR BEFORE BEDTIME  90 capsule 0    KLOR-CON M10 10 MEQ tablet TAKE 1 TABLET (10 MEQ TOTAL) BY MOUTH 2 (TWO) TIMES A DAY FOR 90 DAYS 180 tablet 1    losartan (COZAAR) 50 mg tablet TAKE 1 TABLET BY MOUTH EVERY DAY 90 tablet 0    metFORMIN (GLUCOPHAGE) 500 mg tablet TAKE 1 TABLET BY MOUTH TWICE A DAY WITH MEALS 30 tablet 5    omeprazole (PriLOSEC) 20 mg delayed release capsule TAKE 1 CAPSULE BY MOUTH EVERY DAY 90 capsule 0    timolol (TIMOPTIC) 0 5 % ophthalmic solution INSTILL 1 DROP INTO BOTH EYES IN THE MORNING  3    triamterene-hydrochlorothiazide (DYAZIDE) 37 5-25 mg per capsule TAKE 1 CAPSULE BY MOUTH EVERY DAY 90 capsule 1    verapamil (CALAN) 40 mg tablet 1 tab  tablet 1     No current facility-administered medications for this visit        Allergies   Allergen Reactions    Penicillins Rash     itching      Immunizations:     Immunization History   Administered Date(s) Administered     Influenza (IM) Preservative Free 10/01/2015    INFLUENZA 09/26/2016, 11/03/2017, 09/21/2018    Influenza Quadrivalent Preservative Free 3 years and older IM 10/20/2014    Influenza Split High Dose Preservative Free IM 09/26/2016, 10/03/2019    Influenza TIV (IM) 11/18/2013    Pneumococcal Conjugate 13-Valent 01/07/2016    Pneumococcal Polysaccharide PPV23 02/04/2013    Zoster 05/06/2014      Health Maintenance:         Topic Date Due    DXA SCAN  1945    CRC Screening: Colonoscopy  11/28/2017    MAMMOGRAM  06/07/2020    Hepatitis C Screening  Completed         Topic Date Due    DTaP,Tdap,and Td Vaccines (1 - Tdap) 02/27/1957      Medicare Health Risk Assessment:     There were no vitals taken for this visit  Rajan Tyler is here for her Initial Wellness visit  Health Risk Assessment:   Patient rates overall health as good  Patient feels that their physical health rating is same  Eyesight was rated as same  Hearing was rated as same  Patient feels that their emotional and mental health rating is same  Pain experienced in the last 7 days has been some  Patient's pain rating has been 3/10  Patient states that she has experienced no weight loss or gain in last 6 months  Fall Risk Screening: In the past year, patient has experienced: no history of falling in past year      Urinary Incontinence Screening:   Patient has leaked urine accidently in the last six months  Not a new issue  Present for the past year-1 5 years  Home Safety:  Patient does not have trouble with stairs inside or outside of their home  Patient has working smoke alarms and has no working carbon monoxide detector  Home safety hazards include: none  No CO monitors  Nutrition:   Current diet is Unhealthy and Regular  Eats a lot of carbs - planning on working on diet  Eats vegetables when available  Medications:   Patient is currently taking over-the-counter supplements  OTC medications include: see medication list  Patient is able to manage medications       Activities of Daily Living (ADLs)/Instrumental Activities of Daily Living (IADLs):   Walk and transfer into and out of bed and chair?: Yes  Dress and groom yourself?: Yes    Bathe or shower yourself?: Yes Feed yourself? Yes  Do your laundry/housekeeping?: Yes  Manage your money, pay your bills and track your expenses?: Yes  Make your own meals?: Yes    Do your own shopping?: Yes    Previous Hospitalizations:   Any hospitalizations or ED visits within the last 12 months?: No      Advance Care Planning:   Living will: Yes    Durable POA for healthcare: Yes    Advanced directive: Yes      Comments: Has brought in the past - will try to find in media      Cognitive Screening:   Provider or family/friend/caregiver concerned regarding cognition?: No    PREVENTIVE SCREENINGS      Cardiovascular Screening:    General: Screening Not Indicated and History Lipid Disorder      Diabetes Screening:     General: Screening Not Indicated and History Diabetes      Colorectal Cancer Screening:     General: Screening Current      Breast Cancer Screening:     General: Screening Current      Cervical Cancer Screening:    General: Screening Not Indicated      Osteoporosis Screening:      Due for: DXA Axial      Abdominal Aortic Aneurysm (AAA) Screening:        General: Screening Not Indicated      Lung Cancer Screening:     General: Screening Not Indicated      Hepatitis C Screening:    General: Screening Current      Kristal Gil

## 2020-03-12 NOTE — ASSESSMENT & PLAN NOTE
Lab Results   Component Value Date    HGBA1C 6 9 (H) 03/05/2020   improved A1C  Discussed her goal of <7 4 but because of reduced mortality, low dose metformin started at last visit

## 2020-03-12 NOTE — PATIENT INSTRUCTIONS
Medicare Preventive Visit Patient Instructions  Thank you for completing your Welcome to Medicare Visit or Medicare Annual Wellness Visit today  Your next wellness visit will be due in one year (3/12/2021)  The screening/preventive services that you may require over the next 5-10 years are detailed below  Some tests may not apply to you based off risk factors and/or age  Screening tests ordered at today's visit but not completed yet may show as past due  Also, please note that scanned in results may not display below  Preventive Screenings:  Service Recommendations Previous Testing/Comments   Colorectal Cancer Screening  * Colonoscopy    * Fecal Occult Blood Test (FOBT)/Fecal Immunochemical Test (FIT)  * Fecal DNA/Cologuard Test  * Flexible Sigmoidoscopy Age: 54-65 years old   Colonoscopy: every 10 years (may be performed more frequently if at higher risk)  OR  FOBT/FIT: every 1 year  OR  Cologuard: every 3 years  OR  Sigmoidoscopy: every 5 years  Screening may be recommended earlier than age 48 if at higher risk for colorectal cancer  Also, an individualized decision between you and your healthcare provider will decide whether screening between the ages of 74-80 would be appropriate  Colonoscopy: 11/28/2012  FOBT/FIT: Not on file  Cologuard: Not on file  Sigmoidoscopy: Not on file         Breast Cancer Screening Age: 36 years old  Frequency: every 1-2 years  Not required if history of left and right mastectomy Mammogram: 06/07/2019       Cervical Cancer Screening Between the ages of 21-29, pap smear recommended once every 3 years  Between the ages of 33-67, can perform pap smear with HPV co-testing every 5 years     Recommendations may differ for women with a history of total hysterectomy, cervical cancer, or abnormal pap smears in past  Pap Smear: 02/19/2020       Hepatitis C Screening Once for adults born between 1945 and 1965  More frequently in patients at high risk for Hepatitis C Hep C Antibody: 08/10/2018       Diabetes Screening 1-2 times per year if you're at risk for diabetes or have pre-diabetes Fasting glucose: 149 mg/dL   A1C: 6 9 %       Cholesterol Screening Once every 5 years if you don't have a lipid disorder  May order more often based on risk factors  Lipid panel: 03/05/2020         Other Preventive Screenings Covered by Medicare:  1  Abdominal Aortic Aneurysm (AAA) Screening: covered once if your at risk  You're considered to be at risk if you have a family history of AAA  2  Lung Cancer Screening: covers low dose CT scan once per year if you meet all of the following conditions: (1) Age 50-69; (2) No signs or symptoms of lung cancer; (3) Current smoker or have quit smoking within the last 15 years; (4) You have a tobacco smoking history of at least 30 pack years (packs per day multiplied by number of years you smoked); (5) You get a written order from a healthcare provider  3  Glaucoma Screening: covered annually if you're considered high risk: (1) You have diabetes OR (2) Family history of glaucoma OR (3)  aged 48 and older OR (3)  American aged 72 and older  3  Osteoporosis Screening: covered every 2 years if you meet one of the following conditions: (1) You're estrogen deficient and at risk for osteoporosis based off medical history and other findings; (2) Have a vertebral abnormality; (3) On glucocorticoid therapy for more than 3 months; (4) Have primary hyperparathyroidism; (5) On osteoporosis medications and need to assess response to drug therapy  · Last bone density test (DXA Scan): Not on file  5  HIV Screening: covered annually if you're between the age of 12-76  Also covered annually if you are younger than 13 and older than 72 with risk factors for HIV infection  For pregnant patients, it is covered up to 3 times per pregnancy      Immunizations:  Immunization Recommendations   Influenza Vaccine Annual influenza vaccination during flu season is recommended for all persons aged >= 6 months who do not have contraindications   Pneumococcal Vaccine (Prevnar and Pneumovax)  * Prevnar = PCV13  * Pneumovax = PPSV23   Adults 25-60 years old: 1-3 doses may be recommended based on certain risk factors  Adults 72 years old: Prevnar (PCV13) vaccine recommended followed by Pneumovax (PPSV23) vaccine  If already received PPSV23 since turning 65, then PCV13 recommended at least one year after PPSV23 dose  Hepatitis B Vaccine 3 dose series if at intermediate or high risk (ex: diabetes, end stage renal disease, liver disease)   Tetanus (Td) Vaccine - COST NOT COVERED BY MEDICARE PART B Following completion of primary series, a booster dose should be given every 10 years to maintain immunity against tetanus  Td may also be given as tetanus wound prophylaxis  Tdap Vaccine - COST NOT COVERED BY MEDICARE PART B Recommended at least once for all adults  For pregnant patients, recommended with each pregnancy  Shingles Vaccine (Shingrix) - COST NOT COVERED BY MEDICARE PART B  2 shot series recommended in those aged 48 and above     Health Maintenance Due:      Topic Date Due    DXA SCAN  1945    CRC Screening: Colonoscopy  11/28/2017    MAMMOGRAM  06/07/2020    Hepatitis C Screening  Completed     Immunizations Due:      Topic Date Due    DTaP,Tdap,and Td Vaccines (1 - Tdap) 02/27/1957     Advance Directives   What are advance directives? Advance directives are legal documents that state your wishes and plans for medical care  These plans are made ahead of time in case you lose your ability to make decisions for yourself  Advance directives can apply to any medical decision, such as the treatments you want, and if you want to donate organs  What are the types of advance directives? There are many types of advance directives, and each state has rules about how to use them  You may choose a combination of any of the following:  · Living will:   This is a written record of the treatment you want  You can also choose which treatments you do not want, which to limit, and which to stop at a certain time  This includes surgery, medicine, IV fluid, and tube feedings  · Durable power of  for healthcare [facility]): This is a written record that states who you want to make healthcare choices for you when you are unable to make them for yourself  This person, called a proxy, is usually a family member or a friend  You may choose more than 1 proxy  · Do not resuscitate (DNR) order:  A DNR order is used in case your heart stops beating or you stop breathing  It is a request not to have certain forms of treatment, such as CPR  A DNR order may be included in other types of advance directives  · Medical directive: This covers the care that you want if you are in a coma, near death, or unable to make decisions for yourself  You can list the treatments you want for each condition  Treatment may include pain medicine, surgery, blood transfusions, dialysis, IV or tube feedings, and a ventilator (breathing machine)  · Values history: This document has questions about your views, beliefs, and how you feel and think about life  This information can help others choose the care that you would choose  Why are advance directives important? An advance directive helps you control your care  Although spoken wishes may be used, it is better to have your wishes written down  Spoken wishes can be misunderstood, or not followed  Treatments may be given even if you do not want them  An advance directive may make it easier for your family to make difficult choices about your care  Weight Management   Why it is important to manage your weight:  Being overweight increases your risk of health conditions such as heart disease, high blood pressure, type 2 diabetes, and certain types of cancer  It can also increase your risk for osteoarthritis, sleep apnea, and other respiratory problems  Aim for a slow, steady weight loss  Even a small amount of weight loss can lower your risk of health problems  How to lose weight safely:  A safe and healthy way to lose weight is to eat fewer calories and get regular exercise  You can lose up about 1 pound a week by decreasing the number of calories you eat by 500 calories each day  Healthy meal plan for weight management:  A healthy meal plan includes a variety of foods, contains fewer calories, and helps you stay healthy  A healthy meal plan includes the following:  · Eat whole-grain foods more often  A healthy meal plan should contain fiber  Fiber is the part of grains, fruits, and vegetables that is not broken down by your body  Whole-grain foods are healthy and provide extra fiber in your diet  Some examples of whole-grain foods are whole-wheat breads and pastas, oatmeal, brown rice, and bulgur  · Eat a variety of vegetables every day  Include dark, leafy greens such as spinach, kale, nancy greens, and mustard greens  Eat yellow and orange vegetables such as carrots, sweet potatoes, and winter squash  · Eat a variety of fruits every day  Choose fresh or canned fruit (canned in its own juice or light syrup) instead of juice  Fruit juice has very little or no fiber  · Eat low-fat dairy foods  Drink fat-free (skim) milk or 1% milk  Eat fat-free yogurt and low-fat cottage cheese  Try low-fat cheeses such as mozzarella and other reduced-fat cheeses  · Choose meat and other protein foods that are low in fat  Choose beans or other legumes such as split peas or lentils  Choose fish, skinless poultry (chicken or turkey), or lean cuts of red meat (beef or pork)  Before you cook meat or poultry, cut off any visible fat  · Use less fat and oil  Try baking foods instead of frying them  Add less fat, such as margarine, sour cream, regular salad dressing and mayonnaise to foods  Eat fewer high-fat foods   Some examples of high-fat foods include french fries, doughnuts, ice cream, and cakes  · Eat fewer sweets  Limit foods and drinks that are high in sugar  This includes candy, cookies, regular soda, and sweetened drinks  Exercise:  Exercise at least 30 minutes per day on most days of the week  Some examples of exercise include walking, biking, dancing, and swimming  You can also fit in more physical activity by taking the stairs instead of the elevator or parking farther away from stores  Ask your healthcare provider about the best exercise plan for you  © Copyright [employer] 2018 Information is for End User's use only and may not be sold, redistributed or otherwise used for commercial purposes   All illustrations and images included in CareNotes® are the copyrighted property of [employer]  or [address] [place], [place] [address]pe

## 2020-03-12 NOTE — PROGRESS NOTES
Assessment/Plan:    Type 2 diabetes mellitus without complication (HCC)    Lab Results   Component Value Date    HGBA1C 6 9 (H) 03/05/2020   improved A1C  Discussed her goal of <7 4 but because of reduced mortality, low dose metformin started at last visit  Hyperlipidemia  Hyperlipidemia - 77 y  o female being evaluated for HLD  No myalgias  Continue medication  Last LDL   Lab Results   Component Value Date    LDLCALC 49 03/05/2020         Generalized anxiety disorder  Not a concern from patient at this time  No medications    Benign essential hypertension    Hypertension- patients hypertension is controlled today with a Blood pressure of Blood Pressure: 110/80     on current medications  Continue current medications  Discussed DASH diet and exercise  a      Subjective:      Patient ID: Misty Peralta is a 77 y o  female  Here for AWV and follow up visit  Diabetes Mellitus type 2 - denies polyuria, polydipsia, cp/SOB  Compliant with medications  Home blood sugar monitoring not done  Hypertension - denies CP/SOB/HA  Compliant with medications  No known episodes of hypotension  Hyperlipidemia - no cp/sob/myalgias  Compliant with medications  The following portions of the patient's history were reviewed and updated as appropriate:   She  has a past medical history of Abnormal mammogram of right breast (06/29/2017), Acute medial meniscal tear, left, initial encounter (06/29/2017), Arthritis, Generalized anxiety disorder (08/31/2017), GERD (gastroesophageal reflux disease), Herpes zoster (09/26/2016), Hyperlipidemia, Hypertension, Hypokalemia, Left medial knee pain, Localized osteoarthritis of left knee (03/15/2016), Post-traumatic headache (09/19/2017), and Restless leg syndrome    She   Patient Active Problem List    Diagnosis Date Noted    Skin lesion of scalp 12/09/2019    Morbid obesity ([facility] [address] ) 05/22/2019    Left hip pain 03/07/2019    Chronic fatigue 12/20/2018    Pain of both hip joints 12/20/2018    JAYA and COPD overlap syndrome ([facility] [address] ) 10/19/2018    Dry mouth 10/19/2018    Screening for osteoporosis 08/16/2018    Colon polyps 03/22/2018    Diverticulosis 03/22/2018    Peripheral edema 03/20/2018    Stabbing headache 02/19/2018    Type 2 diabetes mellitus without complication ([facility] [address] ) 15/75/7272    Healthcare maintenance 02/01/2018    Neck pain 10/23/2017    Bilateral occipital neuralgia 09/19/2017    Breast nodule 02/15/2017    Generalized anxiety disorder 09/26/2016    Bilateral hearing loss 08/05/2016    Left medial knee pain     Primary localized osteoarthritis of left knee 03/15/2016    Restless legs syndrome 10/01/2015    Esophageal reflux 06/04/2013    Hyperlipidemia 02/04/2013    Benign essential hypertension 06/27/2012     Current Outpatient Medications   Medication Sig Dispense Refill    aspirin 81 MG tablet Take 81 mg by mouth daily   atorvastatin (LIPITOR) 10 mg tablet TAKE 1 TABLET BY MOUTH EVERY DAY 90 tablet 1    brinzolamide (AZOPT) 1 % ophthalmic suspension Administer 2 drops to both eyes 2 (two) times a day      fluorouracil (EFUDEX) 5 % cream Apply 1 application topically daily  1    gabapentin (NEURONTIN) 100 mg capsule TAKE 1 CAPSULE BY MOUTH AT BEDTIME 90 capsule 0    gabapentin (NEURONTIN) 300 mg capsule TAKE 1 CAPSULE BY MOUTH HALF HOUR BEFORE BEDTIME   90 capsule 0    KLOR-CON M10 10 MEQ tablet TAKE 1 TABLET (10 MEQ TOTAL) BY MOUTH 2 (TWO) TIMES A DAY FOR 90 DAYS 180 tablet 1    losartan (COZAAR) 50 mg tablet TAKE 1 TABLET BY MOUTH EVERY DAY 90 tablet 0    metFORMIN (GLUCOPHAGE) 500 mg tablet Take 1 tablet (500 mg total) by mouth daily with breakfast 90 tablet 3    omeprazole (PriLOSEC) 20 mg delayed release capsule TAKE 1 CAPSULE BY MOUTH EVERY DAY 90 capsule 0    timolol (TIMOPTIC) 0 5 % ophthalmic solution INSTILL 1 DROP INTO BOTH EYES IN THE MORNING  3    triamterene-hydrochlorothiazide (DYAZIDE) 37 5-25 mg per capsule TAKE 1 CAPSULE BY MOUTH EVERY DAY 90 capsule 1    verapamil (CALAN) 40 mg tablet 1 tab  tablet 1    brimonidine tartrate 0 2 % ophthalmic solution        No current facility-administered medications for this visit  She is allergic to penicillins       Review of Systems   Constitutional: Negative for activity change, chills, fever and unexpected weight change  HENT: Negative for congestion  Eyes: Negative for visual disturbance  Respiratory: Negative for cough, chest tightness, shortness of breath and wheezing  Cardiovascular: Negative for chest pain  Gastrointestinal: Negative for abdominal pain, diarrhea and nausea  Endocrine: Negative for cold intolerance and heat intolerance  Musculoskeletal: Negative for arthralgias and myalgias  Skin: Negative for color change  Neurological: Negative for dizziness  Psychiatric/Behavioral: Negative for agitation, dysphoric mood and sleep disturbance  Objective:      /80   Pulse 80   Temp 98 °F (36 7 °C)   Resp 18   Ht 5' 2 5" (1 588 m)   Wt 95 kg (209 lb 6 4 oz)   BMI 37 69 kg/m²          Physical Exam   Constitutional: She is oriented to person, place, and time  She appears well-developed and well-nourished  HENT:   Head: Normocephalic and atraumatic  Cardiovascular: Normal rate, regular rhythm and normal heart sounds  Pulmonary/Chest: Effort normal and breath sounds normal    Abdominal: Soft  Bowel sounds are normal    obese   Musculoskeletal: Normal range of motion  Neurological: She is alert and oriented to person, place, and time  Skin: Skin is warm and dry  Psychiatric: She has a normal mood and affect   Her behavior is normal  Judgment normal

## 2020-03-18 DIAGNOSIS — E78.5 HYPERLIPIDEMIA, UNSPECIFIED HYPERLIPIDEMIA TYPE: ICD-10-CM

## 2020-03-18 RX ORDER — ATORVASTATIN CALCIUM 10 MG/1
TABLET, FILM COATED ORAL
Qty: 90 TABLET | Refills: 1 | Status: SHIPPED | OUTPATIENT
Start: 2020-03-18 | End: 2020-09-06

## 2020-03-30 DIAGNOSIS — I10 HYPERTENSION, UNSPECIFIED TYPE: ICD-10-CM

## 2020-03-30 RX ORDER — LOSARTAN POTASSIUM 50 MG/1
TABLET ORAL
Qty: 90 TABLET | Refills: 0 | Status: SHIPPED | OUTPATIENT
Start: 2020-03-30 | End: 2020-06-24

## 2020-04-02 DIAGNOSIS — I10 ESSENTIAL HYPERTENSION: ICD-10-CM

## 2020-04-02 RX ORDER — TRIAMTERENE AND HYDROCHLOROTHIAZIDE 37.5; 25 MG/1; MG/1
CAPSULE ORAL
Qty: 90 CAPSULE | Refills: 1 | Status: SHIPPED | OUTPATIENT
Start: 2020-04-02 | End: 2020-09-23

## 2020-04-08 ENCOUNTER — TELEPHONE (OUTPATIENT)
Dept: FAMILY MEDICINE CLINIC | Facility: CLINIC | Age: 75
End: 2020-04-08

## 2020-04-16 ENCOUNTER — TELEPHONE (OUTPATIENT)
Dept: NEUROLOGY | Facility: CLINIC | Age: 75
End: 2020-04-16

## 2020-04-17 ENCOUNTER — TELEMEDICINE (OUTPATIENT)
Dept: NEUROLOGY | Facility: CLINIC | Age: 75
End: 2020-04-17
Payer: MEDICARE

## 2020-04-17 ENCOUNTER — PATIENT MESSAGE (OUTPATIENT)
Dept: NEUROLOGY | Facility: CLINIC | Age: 75
End: 2020-04-17

## 2020-04-17 VITALS
HEIGHT: 63 IN | WEIGHT: 209 LBS | SYSTOLIC BLOOD PRESSURE: 132 MMHG | BODY MASS INDEX: 37.03 KG/M2 | DIASTOLIC BLOOD PRESSURE: 66 MMHG

## 2020-04-17 DIAGNOSIS — G47.33 OBSTRUCTIVE SLEEP APNEA: ICD-10-CM

## 2020-04-17 DIAGNOSIS — I10 BENIGN ESSENTIAL HYPERTENSION: ICD-10-CM

## 2020-04-17 DIAGNOSIS — M54.81 BILATERAL OCCIPITAL NEURALGIA: Primary | ICD-10-CM

## 2020-04-17 DIAGNOSIS — G25.81 RESTLESS LEGS SYNDROME: ICD-10-CM

## 2020-04-17 DIAGNOSIS — G43.709 CHRONIC MIGRAINE WITHOUT AURA WITHOUT STATUS MIGRAINOSUS, NOT INTRACTABLE: ICD-10-CM

## 2020-04-17 PROCEDURE — 99443 PR PHYS/QHP TELEPHONE EVALUATION 21-30 MIN: CPT | Performed by: PHYSICIAN ASSISTANT

## 2020-04-17 RX ORDER — POTASSIUM CHLORIDE 750 MG/1
TABLET, EXTENDED RELEASE ORAL
Qty: 180 TABLET | Refills: 1 | Status: SHIPPED | OUTPATIENT
Start: 2020-04-17 | End: 2020-10-19

## 2020-04-19 DIAGNOSIS — G25.81 RESTLESS LEGS SYNDROME: ICD-10-CM

## 2020-04-20 RX ORDER — GABAPENTIN 300 MG/1
CAPSULE ORAL
Qty: 90 CAPSULE | Refills: 0 | Status: SHIPPED | OUTPATIENT
Start: 2020-04-20 | End: 2020-09-02 | Stop reason: DRUGHIGH

## 2020-04-21 DIAGNOSIS — K21.9 GASTROESOPHAGEAL REFLUX DISEASE WITHOUT ESOPHAGITIS: ICD-10-CM

## 2020-04-22 RX ORDER — OMEPRAZOLE 20 MG/1
CAPSULE, DELAYED RELEASE ORAL
Qty: 90 CAPSULE | Refills: 0 | Status: SHIPPED | OUTPATIENT
Start: 2020-04-22 | End: 2020-07-17

## 2020-04-28 DIAGNOSIS — G25.81 RESTLESS LEGS SYNDROME: ICD-10-CM

## 2020-04-28 RX ORDER — GABAPENTIN 100 MG/1
CAPSULE ORAL
Qty: 90 CAPSULE | Refills: 1 | OUTPATIENT
Start: 2020-04-28

## 2020-04-28 RX ORDER — GABAPENTIN 100 MG/1
100 CAPSULE ORAL
Qty: 90 CAPSULE | Refills: 1 | Status: SHIPPED | OUTPATIENT
Start: 2020-04-28 | End: 2020-09-02 | Stop reason: DRUGHIGH

## 2020-05-27 ENCOUNTER — TELEPHONE (OUTPATIENT)
Dept: ADMINISTRATIVE | Facility: OTHER | Age: 75
End: 2020-05-27

## 2020-06-06 DIAGNOSIS — E11.9 TYPE 2 DIABETES MELLITUS WITHOUT COMPLICATION, WITHOUT LONG-TERM CURRENT USE OF INSULIN (HCC): ICD-10-CM

## 2020-06-24 DIAGNOSIS — I10 HYPERTENSION, UNSPECIFIED TYPE: ICD-10-CM

## 2020-06-24 RX ORDER — LOSARTAN POTASSIUM 50 MG/1
TABLET ORAL
Qty: 90 TABLET | Refills: 0 | Status: SHIPPED | OUTPATIENT
Start: 2020-06-24 | End: 2020-09-21

## 2020-07-16 ENCOUNTER — TELEPHONE (OUTPATIENT)
Dept: FAMILY MEDICINE CLINIC | Facility: CLINIC | Age: 75
End: 2020-07-16

## 2020-07-16 DIAGNOSIS — G25.81 RESTLESS LEGS SYNDROME: ICD-10-CM

## 2020-07-16 RX ORDER — GABAPENTIN 300 MG/1
CAPSULE ORAL
Qty: 90 CAPSULE | Refills: 0 | OUTPATIENT
Start: 2020-07-16

## 2020-07-16 NOTE — TELEPHONE ENCOUNTER
----- Message from [address] sent at 7/16/2020  2:18 PM EDT -----  Regarding: needs acute appt  Patient contacted me through my chart about diarrhea since January which she thought was associated with metformin but she has been off the metformin for a week now and her diarrhea has worsened  I think she needs to be seen  I do not think she is COVID despite diarrhea since her diarrhea has been present for several months  Can we please schedule her with a physician tomorrow?

## 2020-07-17 ENCOUNTER — OFFICE VISIT (OUTPATIENT)
Dept: FAMILY MEDICINE CLINIC | Facility: CLINIC | Age: 75
End: 2020-07-17

## 2020-07-17 VITALS
SYSTOLIC BLOOD PRESSURE: 134 MMHG | WEIGHT: 204.6 LBS | HEIGHT: 63 IN | BODY MASS INDEX: 36.25 KG/M2 | RESPIRATION RATE: 18 BRPM | DIASTOLIC BLOOD PRESSURE: 80 MMHG | TEMPERATURE: 97.8 F | HEART RATE: 80 BPM

## 2020-07-17 DIAGNOSIS — R19.7 DIARRHEA, UNSPECIFIED TYPE: Primary | ICD-10-CM

## 2020-07-17 DIAGNOSIS — K21.9 GASTROESOPHAGEAL REFLUX DISEASE WITHOUT ESOPHAGITIS: ICD-10-CM

## 2020-07-17 PROCEDURE — 1036F TOBACCO NON-USER: CPT | Performed by: FAMILY MEDICINE

## 2020-07-17 PROCEDURE — 99213 OFFICE O/P EST LOW 20 MIN: CPT | Performed by: FAMILY MEDICINE

## 2020-07-17 PROCEDURE — 4040F PNEUMOC VAC/ADMIN/RCVD: CPT | Performed by: FAMILY MEDICINE

## 2020-07-17 PROCEDURE — 3079F DIAST BP 80-89 MM HG: CPT | Performed by: FAMILY MEDICINE

## 2020-07-17 PROCEDURE — 3075F SYST BP GE 130 - 139MM HG: CPT | Performed by: FAMILY MEDICINE

## 2020-07-17 PROCEDURE — 1160F RVW MEDS BY RX/DR IN RCRD: CPT | Performed by: FAMILY MEDICINE

## 2020-07-17 PROCEDURE — 3044F HG A1C LEVEL LT 7.0%: CPT | Performed by: FAMILY MEDICINE

## 2020-07-17 PROCEDURE — 3060F POS MICROALBUMINURIA REV: CPT | Performed by: FAMILY MEDICINE

## 2020-07-17 RX ORDER — LEVOBUNOLOL HYDROCHLORIDE 5 MG/ML
SOLUTION/ DROPS OPHTHALMIC
COMMUNITY
Start: 2020-06-10 | End: 2021-03-18

## 2020-07-17 RX ORDER — OMEPRAZOLE 20 MG/1
CAPSULE, DELAYED RELEASE ORAL
Qty: 90 CAPSULE | Refills: 0 | Status: SHIPPED | OUTPATIENT
Start: 2020-07-17 | End: 2020-10-12

## 2020-07-17 NOTE — PATIENT INSTRUCTIONS
Nutrition Tips for Relief of Diarrhea   WHAT YOU NEED TO KNOW:   There are diet changes you can make to help relieve or stop diarrhea  These changes include limiting or avoiding foods and liquids that are high in sugar, fat, fiber, and lactose  Lactose is a sugar found in milk products  Milk products can cause diarrhea in people who are lactose intolerant  You should also drink extra liquids to replace fluids that are lost when you have diarrhea  Diarrhea can lead to dehydration  DISCHARGE INSTRUCTIONS:   Foods to limit or avoid:   · Dairy:      ¨ Whole milk    ¨ Half-and-half, cream, and sour cream    ¨ Regular (whole milk) ice cream    · Grains:      ¨ Whole wheat and whole grain breads, pasta, cereals, and crackers    ¨ Brown and wild rice    ¨ Breads and cereals with seeds or nuts    ¨ Popcorn    · Fruit and vegetables:      ¨ All raw fruits, except bananas and melon    ¨ Dried fruits, including prunes and raisins    ¨ Canned fruit in heavy syrup    ¨ Prune juice and any fruit juice with pulp    ¨ Raw vegetables, except lettuce     ¨ Fried vegetables    ¨ Corn, raw and cooked broccoli, cabbage, cauliflower, and nancy greens    · Protein:      ¨ Fried meat, poultry, and fish    ¨ High-fat luncheon meats, such as bologna    ¨ Fatty meats, such as sausage, norton, and hot dogs    ¨ Beans and nuts    · Liquids:      ¨ Sodas and fruit-flavored drinks    ¨ Drinks that contain caffeine, such as energy drinks, coffee, and tea     ¨ Drinks that contain alcohol or sugar alcohol, such as sorbitol  Foods and liquids you may eat or drink:  Most people can tolerate the foods and liquids listed below  If any of them make your symptoms worse, stop eating or drinking them until you feel better  If you are lactose intolerant, avoid milk products    · Dairy:      ¨ Skim or low-fat milk or evaporated milk    ¨ Soy milk or buttermilk     ¨ Low-fat, part-skim, and aged cheese    ¨ Yogurt, low-fat ice cream, or sherbert    · Grains: (Choose foods with less than 2 grams of dietary fiber per serving )     ¨ White or refined flour breads, bagels, pasta, and crackers    ¨ Cold or hot cereals made from white or refined flour such as puffed rice, cornflakes, or cream of wheat    ¨ White rice    · Fruit and vegetables:      ¨ Bananas or melon    ¨ Fruit juice without pulp, except prune juice    ¨ Canned fruit in juice or light syrup    ¨ Lettuce and most well-cooked vegetables without seeds or skins     ¨ Strained vegetable juice    · Protein:      ¨ Tender, well-cooked meat, poultry, or fish    ¨ Well-cooked eggs or soy foods (cooked without added fat)    ¨ Smooth nut butters    · Fats:  (Limit fats to less than 8 teaspoons a day)     ¨ Oil, butter, or margarine, or mayonnaise    ¨ Cream cheese or salad dressings    · Liquids:      ¨ For infants, breast milk or formula    ¨ Oral rehydration solution     ¨ Decaffeinated coffee or caffeine-free teas    ¨ Soft drinks without caffeine  Other guidelines to follow:   · Drink liquids as directed  You may need to drink more liquids than usual to prevent dehydration  Ask how much liquid to drink each day and which liquids are best for you  You may need to drink an oral rehydration solution (ORS)  An ORS helps replace fluids and electrolytes that you lose when you have diarrhea  · Eat small meals or snacks every 3 to 4 hours  instead of large meals  Continue eating even if you still have diarrhea  Your diarrhea will continue for a few days but should gradually go away  © 2017 [address] [employer] Information is for End User's use only and may not be sold, redistributed or otherwise used for commercial purposes  All illustrations and images included in CareNotes® are the copyrighted property of [employer] , [employer]  or [employer] Castrejon  The above information is an  only  It is not intended as medical advice for individual conditions or treatments   Talk to your doctor, nurse or pharmacist before following any medical regimen to see if it is safe and effective for you

## 2020-07-17 NOTE — PROGRESS NOTES
Assessment/Plan:       Problem List Items Addressed This Visit        Other    Diarrhea - Primary     Loose stools since January, worsening for the last month  Originally thought might be due to Metformin but after stopping medication 2-3 weeks ago, diarrhea worsened  Reports up to 6 episodes of watery, loose stools daily with abdominal discomfort that is relieved with BM  Distant history of C  Diff about 20 years ago and possibly IBS  Denies exposure to any individual who has tested positive for COVID  Concerned for infectious etiology for diarrhea versus IBS  Plan:  Stool culture  Ova Parasite  Check for C  Diff  BMP to check electrolyte level due to diarrhea for more than one month  Discussed dietary precautions and advised to continue hydration            Relevant Orders    Ova and parasite examination    Stool culture    Clostridium difficile toxin by PCR    Basic metabolic panel            Subjective:      Patient ID: Wally Sánchez is a 77 y o  female  HPI  Patient is a 78 yo male with history of GERD, Type 2DM, HTN, and JAYA who presents with complaint of diarrhea that has been ongoing for more than one month  She states she has been having loose stools daily since January but has worsened over the course of the last month  She originally thought Metformin was the cause of her diarrhea so she stopped taking it about 2-3 weeks ago but after stopping her diarrhea worsened  She states she has about 4 loose, watery stools every morning with about 2 loose stools later in the day  She denies any blood in her stool or mucous  She reports associated abdominal discomfort that is relieved with BM  Patient denies any recent antibiotic use and no recent changes in her medication  She denies any recent illness  Has a distant history of C  Diff that was treated with medication over 20 years ago, concerned that this might be similar  Has not tried any OTC medications for relief of symptoms   Reports decreased PO intake the last 2-3 days due to worsened diarrhea but is staying hydrated with water and vitamin water  No other symptoms  States she has not been exposed to anyone with COVID and denies any fever, chills, cough, or shortness of breath  The following portions of the patient's history were reviewed and updated as appropriate:   She  has a past medical history of Abnormal mammogram of right breast (06/29/2017), Acute medial meniscal tear, left, initial encounter (06/29/2017), Arthritis, Generalized anxiety disorder (08/31/2017), GERD (gastroesophageal reflux disease), Herpes zoster (09/26/2016), Hyperlipidemia, Hypertension, Hypokalemia, Left medial knee pain, Localized osteoarthritis of left knee (03/15/2016), Post-traumatic headache (09/19/2017), and Restless leg syndrome    She   Patient Active Problem List    Diagnosis Date Noted    Diarrhea 07/18/2020    Obstructive sleep apnea 04/17/2020    Chronic migraine without aura without status migrainosus, not intractable 04/17/2020    Skin lesion of scalp 12/09/2019    Morbid obesity ([facility] [address] ) 05/22/2019    Left hip pain 03/07/2019    Chronic fatigue 12/20/2018    Pain of both hip joints 12/20/2018    JAYA and COPD overlap syndrome ([facility] [address] ) 10/19/2018    Dry mouth 10/19/2018    Screening for osteoporosis 08/16/2018    Colon polyps 03/22/2018    Diverticulosis 03/22/2018    Peripheral edema 03/20/2018    Stabbing headache 02/19/2018    Type 2 diabetes mellitus without complication ([facility] [address] ) 78/83/7915    Healthcare maintenance 02/01/2018    Neck pain 10/23/2017    Bilateral occipital neuralgia 09/19/2017    Breast nodule 02/15/2017    Generalized anxiety disorder 09/26/2016    Bilateral hearing loss 08/05/2016    Left medial knee pain     Primary localized osteoarthritis of left knee 03/15/2016    Restless legs syndrome 10/01/2015    Esophageal reflux 06/04/2013    Hyperlipidemia 02/04/2013    Benign essential hypertension 06/27/2012     She  has a past surgical history that includes Squamous cell carcinoma excision; Cholecystectomy; Ankle fracture surgery (Right); ARTHROSCOPY KNEE (Left, 5/26/2016); Hysterectomy; and Oophorectomy  Her family history includes Cerebral aneurysm in her sister; Diabetes in her other; Hypertension in her other; No Known Problems in her daughter, maternal aunt, maternal aunt, maternal aunt, and sister; Stroke in her father and mother; Uterine cancer (age of onset: 54) in her sister  She  reports that she has never smoked  She has never used smokeless tobacco  She reports that she drinks alcohol  She reports that she does not use drugs  Current Outpatient Medications on File Prior to Visit   Medication Sig    aspirin 81 MG tablet Take 81 mg by mouth daily   atorvastatin (LIPITOR) 10 mg tablet TAKE 1 TABLET BY MOUTH EVERY DAY    brimonidine tartrate 0 2 % ophthalmic solution     brinzolamide (AZOPT) 1 % ophthalmic suspension Administer 2 drops to both eyes 2 (two) times a day    fluorouracil (EFUDEX) 5 % cream Apply 1 application topically daily    gabapentin (NEURONTIN) 100 mg capsule Take 1 capsule (100 mg total) by mouth daily at bedtime    gabapentin (NEURONTIN) 300 mg capsule TAKE 1 CAPSULE BY MOUTH HALF HOUR BEFORE BEDTIME     KLOR-CON M10 10 MEQ tablet TAKE 1 TABLET (10 MEQ TOTAL) BY MOUTH 2 (TWO) TIMES A DAY FOR 90 DAYS    levobunolol (BETAGAN) 0 5 % ophthalmic solution INSTILL 1 DROP INTO BOTH EYES TWICE A DAY    losartan (COZAAR) 50 mg tablet TAKE 1 TABLET BY MOUTH EVERY DAY    triamterene-hydrochlorothiazide (DYAZIDE) 37 5-25 mg per capsule TAKE 1 CAPSULE BY MOUTH EVERY DAY    verapamil (CALAN) 40 mg tablet 1 tab TID    metFORMIN (GLUCOPHAGE) 500 mg tablet TAKE 1 TABLET BY MOUTH TWICE A DAY WITH MEALS (Patient not taking: Reported on 7/17/2020)     No current facility-administered medications on file prior to visit  She is allergic to penicillins       Review of Systems   Constitutional: Positive for appetite change (decreased)  Negative for chills, diaphoresis, fatigue and fever  HENT: Negative for congestion, rhinorrhea, sinus pain and trouble swallowing  Eyes: Negative for photophobia and visual disturbance  Respiratory: Negative for cough, shortness of breath and wheezing  Cardiovascular: Negative for chest pain, palpitations and leg swelling  Gastrointestinal: Positive for abdominal pain (lower abdominal discomfort) and diarrhea  Negative for blood in stool, constipation, nausea, rectal pain and vomiting  Genitourinary: Negative for dysuria and hematuria  Musculoskeletal: Negative for back pain and neck pain  Skin: Negative for wound  Neurological: Negative for dizziness, weakness, light-headedness and headaches  Objective:      /80 (BP Location: Left arm, Patient Position: Sitting, Cuff Size: Standard)   Pulse 80   Temp 97 8 °F (36 6 °C) (Tympanic)   Resp 18   Ht 5' 2 5" (1 588 m)   Wt 92 8 kg (204 lb 9 6 oz)   BMI 36 83 kg/m²          Physical Exam   Constitutional: She appears well-developed and well-nourished  No distress  HENT:   Head: Normocephalic and atraumatic  Right Ear: External ear normal    Left Ear: External ear normal    Mouth/Throat: Oropharynx is clear and moist    Eyes: EOM are normal    Neck: Neck supple  Cardiovascular: Normal rate and regular rhythm  Pulmonary/Chest: Effort normal and breath sounds normal  No respiratory distress  She has no wheezes  Abdominal: Soft  She exhibits no distension and no mass  Bowel sounds are increased  There is no tenderness  There is no guarding  Musculoskeletal: She exhibits no edema  Lymphadenopathy:     She has no cervical adenopathy  Neurological: She is alert  Skin: Skin is warm  She is not diaphoretic  No erythema  Psychiatric: She has a normal mood and affect  Her behavior is normal    Vitals reviewed              Vik Shepard, DO PGY-2  [facility]  [address]

## 2020-07-18 PROBLEM — R19.7 DIARRHEA: Status: ACTIVE | Noted: 2020-07-18

## 2020-07-18 NOTE — ASSESSMENT & PLAN NOTE
Loose stools since January, worsening for the last month  Originally thought might be due to Metformin but after stopping medication 2-3 weeks ago, diarrhea worsened  Reports up to 6 episodes of watery, loose stools daily with abdominal discomfort that is relieved with BM  Distant history of C  Diff about 20 years ago and possibly IBS  Denies exposure to any individual who has tested positive for COVID  Concerned for infectious etiology for diarrhea versus IBS  Plan:  Stool culture  Ova Parasite  Check for C   Diff  BMP to check electrolyte level due to diarrhea for more than one month

## 2020-07-20 ENCOUNTER — APPOINTMENT (OUTPATIENT)
Dept: LAB | Facility: CLINIC | Age: 75
End: 2020-07-20
Payer: MEDICARE

## 2020-07-20 ENCOUNTER — TRANSCRIBE ORDERS (OUTPATIENT)
Dept: LAB | Facility: CLINIC | Age: 75
End: 2020-07-20

## 2020-07-20 DIAGNOSIS — G44.85 STABBING HEADACHE: ICD-10-CM

## 2020-07-20 DIAGNOSIS — R19.7 DIARRHEA, UNSPECIFIED TYPE: ICD-10-CM

## 2020-07-20 DIAGNOSIS — M54.81 BILATERAL OCCIPITAL NEURALGIA: ICD-10-CM

## 2020-07-20 LAB
ANION GAP SERPL CALCULATED.3IONS-SCNC: 10 MMOL/L (ref 4–13)
BUN SERPL-MCNC: 18 MG/DL (ref 5–25)
CALCIUM SERPL-MCNC: 9 MG/DL (ref 8.3–10.1)
CHLORIDE SERPL-SCNC: 102 MMOL/L (ref 100–108)
CO2 SERPL-SCNC: 28 MMOL/L (ref 21–32)
CREAT SERPL-MCNC: 1.62 MG/DL (ref 0.6–1.3)
GFR SERPL CREATININE-BSD FRML MDRD: 31 ML/MIN/1.73SQ M
GLUCOSE P FAST SERPL-MCNC: 171 MG/DL (ref 65–99)
POTASSIUM SERPL-SCNC: 3.6 MMOL/L (ref 3.5–5.3)
SODIUM SERPL-SCNC: 140 MMOL/L (ref 136–145)

## 2020-07-20 PROCEDURE — 87493 C DIFF AMPLIFIED PROBE: CPT

## 2020-07-20 PROCEDURE — 80048 BASIC METABOLIC PNL TOTAL CA: CPT

## 2020-07-20 PROCEDURE — 36415 COLL VENOUS BLD VENIPUNCTURE: CPT

## 2020-07-20 RX ORDER — VERAPAMIL HYDROCHLORIDE 40 MG/1
TABLET ORAL
Qty: 270 TABLET | Refills: 1 | Status: SHIPPED | OUTPATIENT
Start: 2020-07-20 | End: 2021-01-10

## 2020-07-21 LAB — C DIFF TOX GENS STL QL NAA+PROBE: NEGATIVE

## 2020-07-25 ENCOUNTER — TELEPHONE (OUTPATIENT)
Dept: FAMILY MEDICINE CLINIC | Facility: CLINIC | Age: 75
End: 2020-07-25

## 2020-07-28 ENCOUNTER — TELEPHONE (OUTPATIENT)
Dept: FAMILY MEDICINE CLINIC | Facility: CLINIC | Age: 75
End: 2020-07-28

## 2020-07-28 NOTE — TELEPHONE ENCOUNTER
Called patient today to notify her of negative C  Diff results  She states that since her previous visit her diarrhea had resolved and therefore was unable to send stool sample for stool culture    Advised her that if her symptoms return to call the clinic and we can discuss further the option of GI referral

## 2020-08-05 ENCOUNTER — HOSPITAL ENCOUNTER (OUTPATIENT)
Dept: RADIOLOGY | Age: 75
Discharge: HOME | End: 2020-08-05
Payer: MEDICARE

## 2020-08-05 VITALS — BODY MASS INDEX: 37.54 KG/M2 | WEIGHT: 204 LBS | HEIGHT: 62 IN

## 2020-08-05 DIAGNOSIS — Z12.31 ENCOUNTER FOR SCREENING MAMMOGRAM FOR BREAST CANCER: ICD-10-CM

## 2020-08-05 PROCEDURE — 77067 SCR MAMMO BI INCL CAD: CPT

## 2020-08-05 PROCEDURE — 77063 BREAST TOMOSYNTHESIS BI: CPT

## 2020-09-02 ENCOUNTER — OFFICE VISIT (OUTPATIENT)
Dept: SLEEP CENTER | Facility: CLINIC | Age: 75
End: 2020-09-02
Payer: MEDICARE

## 2020-09-02 VITALS
BODY MASS INDEX: 38.09 KG/M2 | WEIGHT: 207 LBS | SYSTOLIC BLOOD PRESSURE: 116 MMHG | DIASTOLIC BLOOD PRESSURE: 68 MMHG | HEART RATE: 78 BPM | HEIGHT: 62 IN

## 2020-09-02 DIAGNOSIS — E66.9 OBESITY (BMI 30-39.9): ICD-10-CM

## 2020-09-02 DIAGNOSIS — R40.0 DAYTIME SLEEPINESS: ICD-10-CM

## 2020-09-02 DIAGNOSIS — G25.81 RESTLESS LEGS SYNDROME: ICD-10-CM

## 2020-09-02 DIAGNOSIS — G47.33 OBSTRUCTIVE SLEEP APNEA: Primary | ICD-10-CM

## 2020-09-02 DIAGNOSIS — R68.2 DRY MOUTH: ICD-10-CM

## 2020-09-02 PROCEDURE — 99214 OFFICE O/P EST MOD 30 MIN: CPT | Performed by: INTERNAL MEDICINE

## 2020-09-02 RX ORDER — GABAPENTIN 400 MG/1
400 CAPSULE ORAL
Qty: 90 CAPSULE | Refills: 3 | Status: SHIPPED | OUTPATIENT
Start: 2020-09-02 | End: 2021-09-06 | Stop reason: SDUPTHER

## 2020-09-02 NOTE — PROGRESS NOTES
Follow-Up Note - [address]  77 y o  female  :1945  DP    CC: I saw this patient for follow-up in clinic today for Sleep disordered breathing, Coexisting Sleep and Medical Problems  Patient had a split sleep study in 2019: The diagnostic portion demonstrated  : AHI of 13 per hour,   and had no stage REM  Intermittent snoring of moderate intensity was noted  Minimum oxygen saturation was 88 %  During the therapeutic portion of the study, his sleep disordered breathing was adequately remediated with nasal CPAP at 10 cm H2O  Mean oxygen saturation was 93% with brief desaturations to a triston of 87%  There were mild periodic limb movements of sleep  PFSH, Problem List, Medications & Allergies were reviewed in EMR  Interval changes: none reported  She  has a past medical history of Abnormal mammogram of right breast (2017), Acute medial meniscal tear, left, initial encounter (2017), Arthritis, Generalized anxiety disorder (2017), GERD (gastroesophageal reflux disease), Herpes zoster (2016), Hyperlipidemia, Hypertension, Hypokalemia, Left medial knee pain, Localized osteoarthritis of left knee (03/15/2016), Post-traumatic headache (2017), and Restless leg syndrome  She has a current medication list which includes the following prescription(s): aspirin, atorvastatin, brimonidine tartrate, brinzolamide, fluorouracil, gabapentin, gabapentin, klor-con m10, levobunolol, losartan, metformin, omeprazole, triamterene-hydrochlorothiazide, and verapamil  ROS: constitutional, psychiatric, ENT, respiratory,CVS, GI, UGS, CNS, MSK, integumentary, endocrine, hematological reviewed - unremarkable  DATA REVIEWED:  using PAP > 4 hours/night 100% of the time   Estimated HERVE 0 8/hour at pressure of 10cm H2O     SUBJECTIVE: Regarding use of PAP, Sona Matt reports:   · She is experiencing some adverse effects: dry mouth/throat  · She is   benefiting from use: sleeping better and no longer snoring / having breathing difficulties   · Restless leg symptoms are controlled with gabapentin at 400 mg q h s  Sleep Routine: She reports getting 7-8 hrs sleep  ; she has no difficulty initiating or maintaining sleep   She awakens spontaneously and feels refreshed  She has daytime drowsiness and naps for up to an hour  She rated herself at Total score: 11 /24 on the [place] sleepiness scale  Habits: reports that she has never smoked  She has never used smokeless tobacco ,  reports current alcohol use ,  reports no history of drug use , Caffeine use: limited , Exercise routine: sometimes   EXAM: /68   Pulse 78   Ht 5' 2" (1 575 m)   Wt 93 9 kg (207 lb)   BMI 37 86 kg/m²     Patient is well groomed; well appearing  Skin/Extrem: warm & dry; col & hydration normal; no edema  Psych: cooperativeand in no distress  Mental state appears normal   CNS: Alert, orientated, clear & coherent speech  H&N: EOMI; NC/AT:no facial pressure marks, no rashes  ENMT Mucus membranes appear normal Nasal airway:patent  Oral airway:  crowded  Resp:effort is normal CVS: RRR ABD:truncal obesity MSK:Gait normal     IMPRESSION: Primary Sleep/Secondary(to Medical or Psych conditions) & comorbidities   1  Obstructive sleep apnea     2  Restless legs syndrome     3  Daytime sleepiness     4  Dry mouth     5  Obesity (BMI 30-39  9)       PLAN:  1  Treatment with  PAP is medically necessary and Nevaeh Backer is agreable to continue use  2  Care of equipment, methods to improve comfort using PAP and importance of compliance with therapy were discussed  3  Pressure setting: continue 10 cmH2O     4  Rx provided to replace supplies and Care coordinated with DME provider  5  Continue gabapentin 400 mg q h s  6  Discussed  strategies for weight reduction  7  Follow-up is advised in 1 year or sooner if needed to monitor progress, compliance and to adjust therapy        Thank you for allowing me to participate in the care of this patient      Sincerely,    Authenticated electronically by Sarbjit Graves MD on 27/75/43   Board Certified Specialist

## 2020-09-02 NOTE — PATIENT INSTRUCTIONS

## 2020-09-03 ENCOUNTER — TELEPHONE (OUTPATIENT)
Dept: SLEEP CENTER | Facility: CLINIC | Age: 75
End: 2020-09-03

## 2020-09-06 DIAGNOSIS — E78.5 HYPERLIPIDEMIA, UNSPECIFIED HYPERLIPIDEMIA TYPE: ICD-10-CM

## 2020-09-06 RX ORDER — ATORVASTATIN CALCIUM 10 MG/1
TABLET, FILM COATED ORAL
Qty: 90 TABLET | Refills: 1 | Status: SHIPPED | OUTPATIENT
Start: 2020-09-06 | End: 2021-03-01

## 2020-09-11 ENCOUNTER — TRANSCRIBE ORDERS (OUTPATIENT)
Dept: LAB | Facility: CLINIC | Age: 75
End: 2020-09-11

## 2020-09-11 ENCOUNTER — APPOINTMENT (OUTPATIENT)
Dept: LAB | Facility: CLINIC | Age: 75
End: 2020-09-11
Payer: MEDICARE

## 2020-09-11 DIAGNOSIS — I10 BENIGN ESSENTIAL HYPERTENSION: ICD-10-CM

## 2020-09-11 DIAGNOSIS — E11.9 TYPE 2 DIABETES MELLITUS WITHOUT COMPLICATION, WITHOUT LONG-TERM CURRENT USE OF INSULIN (HCC): ICD-10-CM

## 2020-09-11 DIAGNOSIS — E78.5 HYPERLIPIDEMIA, UNSPECIFIED HYPERLIPIDEMIA TYPE: ICD-10-CM

## 2020-09-11 LAB
ALBUMIN SERPL BCP-MCNC: 3.9 G/DL (ref 3.5–5)
ALP SERPL-CCNC: 77 U/L (ref 46–116)
ALT SERPL W P-5'-P-CCNC: 36 U/L (ref 12–78)
ANION GAP SERPL CALCULATED.3IONS-SCNC: 10 MMOL/L (ref 4–13)
AST SERPL W P-5'-P-CCNC: 27 U/L (ref 5–45)
BASOPHILS # BLD AUTO: 0.05 THOUSANDS/ΜL (ref 0–0.1)
BASOPHILS NFR BLD AUTO: 1 % (ref 0–1)
BILIRUB SERPL-MCNC: 0.58 MG/DL (ref 0.2–1)
BUN SERPL-MCNC: 15 MG/DL (ref 5–25)
CALCIUM SERPL-MCNC: 9.5 MG/DL (ref 8.3–10.1)
CHLORIDE SERPL-SCNC: 102 MMOL/L (ref 100–108)
CHOLEST SERPL-MCNC: 138 MG/DL (ref 50–200)
CO2 SERPL-SCNC: 29 MMOL/L (ref 21–32)
CREAT SERPL-MCNC: 1.03 MG/DL (ref 0.6–1.3)
EOSINOPHIL # BLD AUTO: 0.2 THOUSAND/ΜL (ref 0–0.61)
EOSINOPHIL NFR BLD AUTO: 3 % (ref 0–6)
ERYTHROCYTE [DISTWIDTH] IN BLOOD BY AUTOMATED COUNT: 13.2 % (ref 11.6–15.1)
EST. AVERAGE GLUCOSE BLD GHB EST-MCNC: 146 MG/DL
GFR SERPL CREATININE-BSD FRML MDRD: 54 ML/MIN/1.73SQ M
GLUCOSE P FAST SERPL-MCNC: 151 MG/DL (ref 65–99)
HBA1C MFR BLD: 6.7 %
HCT VFR BLD AUTO: 41.2 % (ref 34.8–46.1)
HDLC SERPL-MCNC: 53 MG/DL
HGB BLD-MCNC: 13.4 G/DL (ref 11.5–15.4)
IMM GRANULOCYTES # BLD AUTO: 0.03 THOUSAND/UL (ref 0–0.2)
IMM GRANULOCYTES NFR BLD AUTO: 1 % (ref 0–2)
LDLC SERPL CALC-MCNC: 55 MG/DL (ref 0–100)
LYMPHOCYTES # BLD AUTO: 1.83 THOUSANDS/ΜL (ref 0.6–4.47)
LYMPHOCYTES NFR BLD AUTO: 29 % (ref 14–44)
MCH RBC QN AUTO: 31.2 PG (ref 26.8–34.3)
MCHC RBC AUTO-ENTMCNC: 32.5 G/DL (ref 31.4–37.4)
MCV RBC AUTO: 96 FL (ref 82–98)
MONOCYTES # BLD AUTO: 0.56 THOUSAND/ΜL (ref 0.17–1.22)
MONOCYTES NFR BLD AUTO: 9 % (ref 4–12)
NEUTROPHILS # BLD AUTO: 3.75 THOUSANDS/ΜL (ref 1.85–7.62)
NEUTS SEG NFR BLD AUTO: 57 % (ref 43–75)
NONHDLC SERPL-MCNC: 85 MG/DL
NRBC BLD AUTO-RTO: 0 /100 WBCS
PLATELET # BLD AUTO: 301 THOUSANDS/UL (ref 149–390)
PMV BLD AUTO: 10 FL (ref 8.9–12.7)
POTASSIUM SERPL-SCNC: 3.7 MMOL/L (ref 3.5–5.3)
PROT SERPL-MCNC: 7.5 G/DL (ref 6.4–8.2)
RBC # BLD AUTO: 4.29 MILLION/UL (ref 3.81–5.12)
SODIUM SERPL-SCNC: 141 MMOL/L (ref 136–145)
TRIGL SERPL-MCNC: 149 MG/DL
WBC # BLD AUTO: 6.42 THOUSAND/UL (ref 4.31–10.16)

## 2020-09-11 PROCEDURE — 36415 COLL VENOUS BLD VENIPUNCTURE: CPT

## 2020-09-11 PROCEDURE — 85025 COMPLETE CBC W/AUTO DIFF WBC: CPT

## 2020-09-11 PROCEDURE — 83036 HEMOGLOBIN GLYCOSYLATED A1C: CPT

## 2020-09-11 PROCEDURE — 80053 COMPREHEN METABOLIC PANEL: CPT

## 2020-09-11 PROCEDURE — 80061 LIPID PANEL: CPT

## 2020-09-14 DIAGNOSIS — E11.9 TYPE 2 DIABETES MELLITUS WITHOUT COMPLICATION, WITHOUT LONG-TERM CURRENT USE OF INSULIN (HCC): ICD-10-CM

## 2020-09-14 NOTE — PROGRESS NOTES
Assessment/Plan:    Type 2 diabetes mellitus without complication (HCC)    Lab Results   Component Value Date    HGBA1C 6 7 (H) 09/11/2020   well controlled without medications  We had originally started metformin to decrease mortality in the setting of increasing A1C, but she had diarrhea that was severe but ultimately was not clearly related to metformin but may have been  We agreed to try metformin XL and if that isn't covered, then will try metformin 250mg/day  She agrees to this plan        a    BMI Counseling: Body mass index is 38 01 kg/m²  The BMI is above normal  Nutrition recommendations include reducing portion sizes, decreasing overall calorie intake and 3-5 servings of fruits/vegetables daily  Exercise recommendations include moderate aerobic physical activity for 150 minutes/week  Subjective:      Patient ID: Jet Dasilva is a 77 y o  female  Had concerns about   metformin - she had diarrhea shortly after starting metformin but it persisted after stopping the medication and it is not clear if these were related  Fiber - she is worried this is worsening diarrhea - we discussed ways to get dietary fiber  Saw GYN and doesn't need further testing due to normal pap and age     Would like to have me write for her verapamil for headaches now she is on a steady dose and she doesn't like to continue to see multiple doctors  I said that was fine  The following portions of the patient's history were reviewed and updated as appropriate:   She  has a past medical history of Abnormal mammogram of right breast (06/29/2017), Acute medial meniscal tear, left, initial encounter (06/29/2017), Arthritis, Generalized anxiety disorder (08/31/2017), GERD (gastroesophageal reflux disease), Herpes zoster (09/26/2016), Hyperlipidemia, Hypertension, Hypokalemia, Left medial knee pain, Localized osteoarthritis of left knee (03/15/2016), Post-traumatic headache (09/19/2017), and Restless leg syndrome    She   Patient Active Problem List    Diagnosis Date Noted    Diarrhea 07/18/2020    Obstructive sleep apnea 04/17/2020    Chronic migraine without aura without status migrainosus, not intractable 04/17/2020    Skin lesion of scalp 12/09/2019    Morbid obesity ([facility] ) 05/22/2019    Left hip pain 03/07/2019    Chronic fatigue 12/20/2018    Pain of both hip joints 12/20/2018    JAYA and COPD overlap syndrome ([facility] ) 10/19/2018    Dry mouth 10/19/2018    Screening for osteoporosis 08/16/2018    Colon polyps 03/22/2018    Diverticulosis 03/22/2018    Peripheral edema 03/20/2018    Stabbing headache 02/19/2018    Type 2 [facility] mellitus without complication [facility] 52/53/6438    Healthcare maintenance 02/01/2018    Neck pain 10/23/2017    Bilateral occipital neuralgia 09/19/2017    Breast nodule 02/15/2017    Generalized anxiety disorder 09/26/2016    Bilateral hearing loss 08/05/2016    Left medial knee pain     Primary localized osteoarthritis of left knee 03/15/2016    Restless legs syndrome 10/01/2015    Esophageal reflux 06/04/2013    Hyperlipidemia 02/04/2013    Benign essential hypertension 06/27/2012     Current Outpatient Medications   Medication Sig Dispense Refill    aspirin 81 MG tablet Take 81 mg by mouth daily        atorvastatin (LIPITOR) 10 mg tablet TAKE 1 TABLET BY MOUTH EVERY DAY 90 tablet 1    brimonidine tartrate 0 2 % ophthalmic solution       fluorouracil (EFUDEX) 5 % cream Apply 1 application topically daily  1    gabapentin (NEURONTIN) 400 mg capsule Take 1 capsule (400 mg total) by mouth daily at bedtime 90 capsule 3    KLOR-CON M10 10 MEQ tablet TAKE 1 TABLET (10 MEQ TOTAL) BY MOUTH 2 (TWO) TIMES A DAY FOR 90 DAYS 180 tablet 1    levobunolol (BETAGAN) 0 5 % ophthalmic solution INSTILL 1 DROP INTO BOTH EYES TWICE A DAY      omeprazole (PriLOSEC) 20 mg delayed release capsule TAKE 1 CAPSULE BY MOUTH EVERY DAY 90 capsule 0    triamterene-hydrochlorothiazide (DYAZIDE) 37 5-25 mg per capsule TAKE 1 CAPSULE BY MOUTH EVERY DAY 90 capsule 1    verapamil (CALAN) 40 mg tablet TAKE 1 TABLET BY MOUTH THREE TIMES A  tablet 1    brinzolamide (AZOPT) 1 % ophthalmic suspension Administer 2 drops to both eyes 2 (two) times a day      losartan (COZAAR) 50 mg tablet TAKE 1 TABLET BY MOUTH EVERY DAY 90 tablet 0    metFORMIN (GLUCOPHAGE) 500 mg tablet TAKE 1 TABLET BY MOUTH TWICE A DAY WITH MEALS (Patient not taking: Reported on 9/17/2020) 180 tablet 0    metFORMIN (GLUCOPHAGE-XR) 500 mg 24 hr tablet Take 1 tablet (500 mg total) by mouth daily with dinner 30 tablet 1     No current facility-administered medications for this visit  She is allergic to penicillins       Review of Systems   Constitutional: Negative for activity change, chills, fever and unexpected weight change  HENT: Negative for congestion  Eyes: Negative for visual disturbance  Respiratory: Negative for cough, chest tightness, shortness of breath and wheezing  Cardiovascular: Negative for chest pain  Gastrointestinal: Negative for abdominal pain, diarrhea and nausea  Endocrine: Negative for cold intolerance and heat intolerance  Musculoskeletal: Negative for arthralgias and myalgias  Skin: Negative for color change  Neurological: Negative for dizziness  Psychiatric/Behavioral: Negative for agitation, dysphoric mood and sleep disturbance  Objective:      /70   Pulse 82   Temp 98 1 °F (36 7 °C)   Resp 16   Ht 5' 2" (1 575 m)   Wt 94 3 kg (207 lb 12 8 oz)   BMI 38 01 kg/m²          Physical Exam  Vitals signs and nursing note reviewed  Constitutional:       Appearance: She is well-developed  HENT:      Head: Normocephalic and atraumatic  Cardiovascular:      Rate and Rhythm: Normal rate and regular rhythm  Heart sounds: Normal heart sounds  Pulmonary:      Effort: Pulmonary effort is normal       Breath sounds: Normal breath sounds     Abdominal:      General: Bowel sounds are normal       Palpations: Abdomen is soft  Musculoskeletal: Normal range of motion  Skin:     General: Skin is warm and dry  Neurological:      Mental Status: She is alert and oriented to person, place, and time     Psychiatric:         Behavior: Behavior normal          Judgment: Judgment normal

## 2020-09-17 ENCOUNTER — OFFICE VISIT (OUTPATIENT)
Dept: FAMILY MEDICINE CLINIC | Facility: CLINIC | Age: 75
End: 2020-09-17

## 2020-09-17 VITALS
TEMPERATURE: 98.1 F | BODY MASS INDEX: 38.24 KG/M2 | SYSTOLIC BLOOD PRESSURE: 120 MMHG | DIASTOLIC BLOOD PRESSURE: 70 MMHG | RESPIRATION RATE: 16 BRPM | WEIGHT: 207.8 LBS | HEIGHT: 62 IN | HEART RATE: 82 BPM

## 2020-09-17 DIAGNOSIS — E11.9 TYPE 2 DIABETES MELLITUS WITHOUT COMPLICATION, WITHOUT LONG-TERM CURRENT USE OF INSULIN (HCC): ICD-10-CM

## 2020-09-17 DIAGNOSIS — Z23 ENCOUNTER FOR IMMUNIZATION: Primary | ICD-10-CM

## 2020-09-17 DIAGNOSIS — Z12.39 SCREENING FOR BREAST CANCER: ICD-10-CM

## 2020-09-17 DIAGNOSIS — Z12.31 ENCOUNTER FOR SCREENING MAMMOGRAM FOR MALIGNANT NEOPLASM OF BREAST: ICD-10-CM

## 2020-09-17 PROCEDURE — 99213 OFFICE O/P EST LOW 20 MIN: CPT | Performed by: FAMILY MEDICINE

## 2020-09-17 PROCEDURE — 90662 IIV NO PRSV INCREASED AG IM: CPT | Performed by: FAMILY MEDICINE

## 2020-09-17 PROCEDURE — G0008 ADMIN INFLUENZA VIRUS VAC: HCPCS | Performed by: FAMILY MEDICINE

## 2020-09-17 RX ORDER — METFORMIN HYDROCHLORIDE 500 MG/1
500 TABLET, EXTENDED RELEASE ORAL
Qty: 30 TABLET | Refills: 1 | Status: SHIPPED | OUTPATIENT
Start: 2020-09-17 | End: 2020-10-12

## 2020-09-19 DIAGNOSIS — I10 HYPERTENSION, UNSPECIFIED TYPE: ICD-10-CM

## 2020-09-21 RX ORDER — LOSARTAN POTASSIUM 50 MG/1
TABLET ORAL
Qty: 90 TABLET | Refills: 0 | Status: SHIPPED | OUTPATIENT
Start: 2020-09-21 | End: 2020-12-20

## 2020-09-21 NOTE — ASSESSMENT & PLAN NOTE
Lab Results   Component Value Date    HGBA1C 6 7 (H) 09/11/2020   well controlled without medications  We had originally started metformin to decrease mortality in the setting of increasing A1C, but she had diarrhea that was severe but ultimately was not clearly related to metformin but may have been  We agreed to try metformin XL and if that isn't covered, then will try metformin 250mg/day  She agrees to this plan

## 2020-09-23 DIAGNOSIS — I10 ESSENTIAL HYPERTENSION: ICD-10-CM

## 2020-09-23 RX ORDER — TRIAMTERENE AND HYDROCHLOROTHIAZIDE 37.5; 25 MG/1; MG/1
CAPSULE ORAL
Qty: 90 CAPSULE | Refills: 1 | Status: SHIPPED | OUTPATIENT
Start: 2020-09-23 | End: 2021-03-22

## 2020-10-11 DIAGNOSIS — K21.9 GASTROESOPHAGEAL REFLUX DISEASE WITHOUT ESOPHAGITIS: ICD-10-CM

## 2020-10-11 DIAGNOSIS — E11.9 TYPE 2 DIABETES MELLITUS WITHOUT COMPLICATION, WITHOUT LONG-TERM CURRENT USE OF INSULIN (HCC): ICD-10-CM

## 2020-10-12 RX ORDER — OMEPRAZOLE 20 MG/1
CAPSULE, DELAYED RELEASE ORAL
Qty: 90 CAPSULE | Refills: 0 | Status: SHIPPED | OUTPATIENT
Start: 2020-10-12 | End: 2021-01-07

## 2020-10-12 RX ORDER — METFORMIN HYDROCHLORIDE 500 MG/1
TABLET, EXTENDED RELEASE ORAL
Qty: 30 TABLET | Refills: 1 | Status: SHIPPED | OUTPATIENT
Start: 2020-10-12 | End: 2020-10-19 | Stop reason: SDUPTHER

## 2020-10-16 DIAGNOSIS — I10 BENIGN ESSENTIAL HYPERTENSION: ICD-10-CM

## 2020-10-19 DIAGNOSIS — E11.9 TYPE 2 DIABETES MELLITUS WITHOUT COMPLICATION, WITHOUT LONG-TERM CURRENT USE OF INSULIN (HCC): ICD-10-CM

## 2020-10-19 RX ORDER — POTASSIUM CHLORIDE 750 MG/1
TABLET, EXTENDED RELEASE ORAL
Qty: 180 TABLET | Refills: 1 | Status: SHIPPED | OUTPATIENT
Start: 2020-10-19 | End: 2021-04-21

## 2020-10-23 RX ORDER — METFORMIN HYDROCHLORIDE 500 MG/1
500 TABLET, EXTENDED RELEASE ORAL
Qty: 90 TABLET | Refills: 1 | Status: SHIPPED | OUTPATIENT
Start: 2020-10-23 | End: 2021-04-21

## 2020-12-20 DIAGNOSIS — I10 HYPERTENSION, UNSPECIFIED TYPE: ICD-10-CM

## 2020-12-20 RX ORDER — LOSARTAN POTASSIUM 50 MG/1
TABLET ORAL
Qty: 90 TABLET | Refills: 0 | Status: SHIPPED | OUTPATIENT
Start: 2020-12-20 | End: 2021-03-19

## 2021-01-07 DIAGNOSIS — K21.9 GASTROESOPHAGEAL REFLUX DISEASE WITHOUT ESOPHAGITIS: ICD-10-CM

## 2021-01-07 RX ORDER — OMEPRAZOLE 20 MG/1
CAPSULE, DELAYED RELEASE ORAL
Qty: 90 CAPSULE | Refills: 0 | Status: SHIPPED | OUTPATIENT
Start: 2021-01-07 | End: 2021-03-18 | Stop reason: SDUPTHER

## 2021-01-10 DIAGNOSIS — M54.81 BILATERAL OCCIPITAL NEURALGIA: ICD-10-CM

## 2021-01-10 DIAGNOSIS — G44.85 STABBING HEADACHE: ICD-10-CM

## 2021-01-10 RX ORDER — VERAPAMIL HYDROCHLORIDE 40 MG/1
TABLET ORAL
Qty: 270 TABLET | Refills: 1 | Status: SHIPPED | OUTPATIENT
Start: 2021-01-10 | End: 2021-07-07

## 2021-01-20 DIAGNOSIS — Z23 ENCOUNTER FOR IMMUNIZATION: ICD-10-CM

## 2021-01-21 ENCOUNTER — IMMUNIZATIONS (OUTPATIENT)
Dept: FAMILY MEDICINE CLINIC | Facility: HOSPITAL | Age: 76
End: 2021-01-21

## 2021-01-21 DIAGNOSIS — Z23 ENCOUNTER FOR IMMUNIZATION: Primary | ICD-10-CM

## 2021-01-21 PROCEDURE — 91300 SARS-COV-2 / COVID-19 MRNA VACCINE (PFIZER-BIONTECH) 30 MCG: CPT

## 2021-01-21 PROCEDURE — 0001A SARS-COV-2 / COVID-19 MRNA VACCINE (PFIZER-BIONTECH) 30 MCG: CPT

## 2021-02-10 ENCOUNTER — IMMUNIZATIONS (OUTPATIENT)
Dept: FAMILY MEDICINE CLINIC | Facility: HOSPITAL | Age: 76
End: 2021-02-10

## 2021-02-10 DIAGNOSIS — Z23 ENCOUNTER FOR IMMUNIZATION: Primary | ICD-10-CM

## 2021-02-10 PROCEDURE — 0002A SARS-COV-2 / COVID-19 MRNA VACCINE (PFIZER-BIONTECH) 30 MCG: CPT

## 2021-02-10 PROCEDURE — 91300 SARS-COV-2 / COVID-19 MRNA VACCINE (PFIZER-BIONTECH) 30 MCG: CPT

## 2021-03-01 DIAGNOSIS — E78.5 HYPERLIPIDEMIA, UNSPECIFIED HYPERLIPIDEMIA TYPE: ICD-10-CM

## 2021-03-01 RX ORDER — ATORVASTATIN CALCIUM 10 MG/1
TABLET, FILM COATED ORAL
Qty: 90 TABLET | Refills: 1 | Status: SHIPPED | OUTPATIENT
Start: 2021-03-01 | End: 2021-08-27

## 2021-03-10 ENCOUNTER — TRANSCRIBE ORDERS (OUTPATIENT)
Dept: LAB | Facility: CLINIC | Age: 76
End: 2021-03-10

## 2021-03-10 ENCOUNTER — APPOINTMENT (OUTPATIENT)
Dept: LAB | Facility: CLINIC | Age: 76
End: 2021-03-10
Payer: MEDICARE

## 2021-03-10 DIAGNOSIS — E11.9 TYPE 2 DIABETES MELLITUS WITHOUT COMPLICATION, WITHOUT LONG-TERM CURRENT USE OF INSULIN (HCC): ICD-10-CM

## 2021-03-10 LAB
ALBUMIN SERPL BCP-MCNC: 4 G/DL (ref 3.5–5)
ALP SERPL-CCNC: 84 U/L (ref 46–116)
ALT SERPL W P-5'-P-CCNC: 38 U/L (ref 12–78)
ANION GAP SERPL CALCULATED.3IONS-SCNC: 9 MMOL/L (ref 4–13)
AST SERPL W P-5'-P-CCNC: 30 U/L (ref 5–45)
BASOPHILS # BLD AUTO: 0.03 THOUSANDS/ΜL (ref 0–0.1)
BASOPHILS NFR BLD AUTO: 0 % (ref 0–1)
BILIRUB SERPL-MCNC: 0.6 MG/DL (ref 0.2–1)
BUN SERPL-MCNC: 18 MG/DL (ref 5–25)
CALCIUM SERPL-MCNC: 9.6 MG/DL (ref 8.3–10.1)
CHLORIDE SERPL-SCNC: 104 MMOL/L (ref 100–108)
CHOLEST SERPL-MCNC: 128 MG/DL (ref 50–200)
CO2 SERPL-SCNC: 30 MMOL/L (ref 21–32)
CREAT SERPL-MCNC: 1.16 MG/DL (ref 0.6–1.3)
EOSINOPHIL # BLD AUTO: 0.25 THOUSAND/ΜL (ref 0–0.61)
EOSINOPHIL NFR BLD AUTO: 4 % (ref 0–6)
ERYTHROCYTE [DISTWIDTH] IN BLOOD BY AUTOMATED COUNT: 13.4 % (ref 11.6–15.1)
EST. AVERAGE GLUCOSE BLD GHB EST-MCNC: 140 MG/DL
GFR SERPL CREATININE-BSD FRML MDRD: 46 ML/MIN/1.73SQ M
GLUCOSE P FAST SERPL-MCNC: 141 MG/DL (ref 65–99)
HBA1C MFR BLD: 6.5 %
HCT VFR BLD AUTO: 43.4 % (ref 34.8–46.1)
HDLC SERPL-MCNC: 55 MG/DL
HGB BLD-MCNC: 13.9 G/DL (ref 11.5–15.4)
IMM GRANULOCYTES # BLD AUTO: 0.03 THOUSAND/UL (ref 0–0.2)
IMM GRANULOCYTES NFR BLD AUTO: 0 % (ref 0–2)
LDLC SERPL CALC-MCNC: 50 MG/DL (ref 0–100)
LYMPHOCYTES # BLD AUTO: 1.97 THOUSANDS/ΜL (ref 0.6–4.47)
LYMPHOCYTES NFR BLD AUTO: 29 % (ref 14–44)
MCH RBC QN AUTO: 30.9 PG (ref 26.8–34.3)
MCHC RBC AUTO-ENTMCNC: 32 G/DL (ref 31.4–37.4)
MCV RBC AUTO: 96 FL (ref 82–98)
MONOCYTES # BLD AUTO: 0.57 THOUSAND/ΜL (ref 0.17–1.22)
MONOCYTES NFR BLD AUTO: 8 % (ref 4–12)
NEUTROPHILS # BLD AUTO: 4 THOUSANDS/ΜL (ref 1.85–7.62)
NEUTS SEG NFR BLD AUTO: 59 % (ref 43–75)
NONHDLC SERPL-MCNC: 73 MG/DL
NRBC BLD AUTO-RTO: 0 /100 WBCS
PLATELET # BLD AUTO: 347 THOUSANDS/UL (ref 149–390)
PMV BLD AUTO: 10.1 FL (ref 8.9–12.7)
POTASSIUM SERPL-SCNC: 4 MMOL/L (ref 3.5–5.3)
PROT SERPL-MCNC: 8 G/DL (ref 6.4–8.2)
RBC # BLD AUTO: 4.5 MILLION/UL (ref 3.81–5.12)
SODIUM SERPL-SCNC: 143 MMOL/L (ref 136–145)
TRIGL SERPL-MCNC: 117 MG/DL
WBC # BLD AUTO: 6.85 THOUSAND/UL (ref 4.31–10.16)

## 2021-03-10 PROCEDURE — 83036 HEMOGLOBIN GLYCOSYLATED A1C: CPT

## 2021-03-10 PROCEDURE — 36415 COLL VENOUS BLD VENIPUNCTURE: CPT

## 2021-03-10 PROCEDURE — 80061 LIPID PANEL: CPT

## 2021-03-10 PROCEDURE — 80053 COMPREHEN METABOLIC PANEL: CPT

## 2021-03-10 PROCEDURE — 85025 COMPLETE CBC W/AUTO DIFF WBC: CPT

## 2021-03-18 ENCOUNTER — OFFICE VISIT (OUTPATIENT)
Dept: FAMILY MEDICINE CLINIC | Facility: CLINIC | Age: 76
End: 2021-03-18

## 2021-03-18 VITALS
TEMPERATURE: 98 F | DIASTOLIC BLOOD PRESSURE: 80 MMHG | HEIGHT: 62 IN | BODY MASS INDEX: 38.24 KG/M2 | HEART RATE: 78 BPM | RESPIRATION RATE: 14 BRPM | SYSTOLIC BLOOD PRESSURE: 130 MMHG | WEIGHT: 207.8 LBS

## 2021-03-18 DIAGNOSIS — E11.9 TYPE 2 DIABETES MELLITUS WITHOUT COMPLICATION, WITHOUT LONG-TERM CURRENT USE OF INSULIN (HCC): ICD-10-CM

## 2021-03-18 DIAGNOSIS — G47.33 OSA AND COPD OVERLAP SYNDROME (HCC): ICD-10-CM

## 2021-03-18 DIAGNOSIS — E66.01 MORBID OBESITY (HCC): ICD-10-CM

## 2021-03-18 DIAGNOSIS — Z00.00 HEALTHCARE MAINTENANCE: Primary | ICD-10-CM

## 2021-03-18 DIAGNOSIS — K21.9 GASTROESOPHAGEAL REFLUX DISEASE WITHOUT ESOPHAGITIS: ICD-10-CM

## 2021-03-18 DIAGNOSIS — J44.9 OSA AND COPD OVERLAP SYNDROME (HCC): ICD-10-CM

## 2021-03-18 PROCEDURE — 99214 OFFICE O/P EST MOD 30 MIN: CPT | Performed by: FAMILY MEDICINE

## 2021-03-18 RX ORDER — DORZOLAMIDE HYDROCHLORIDE AND TIMOLOL MALEATE 20; 5 MG/ML; MG/ML
SOLUTION/ DROPS OPHTHALMIC
COMMUNITY
Start: 2021-01-25

## 2021-03-18 RX ORDER — OMEPRAZOLE 10 MG/1
10 CAPSULE, DELAYED RELEASE ORAL DAILY
Qty: 30 CAPSULE | Refills: 3 | Status: SHIPPED | OUTPATIENT
Start: 2021-03-18 | End: 2021-06-15

## 2021-03-18 NOTE — ASSESSMENT & PLAN NOTE
BMI Counseling: Body mass index is 38 01 kg/m²  The BMI is above normal  Nutrition recommendations include reducing portion sizes, decreasing overall calorie intake and 3-5 servings of fruits/vegetables daily  Exercise recommendations include moderate aerobic physical activity for 150 minutes/week

## 2021-03-18 NOTE — ASSESSMENT & PLAN NOTE
Lab Results   Component Value Date    HGBA1C 6 5 (H) 03/10/2021     Diabetes type 2 -   Lab Results   Component Value Date    HGBA1C 6 5 (H) 03/10/2021      Patient is controlled  Medication changes include none  Eye exam UTD - no   Foot exam UTD - yes  ACEi - yes  Statin - yes  Discussed diet and exercise as part of the diabetic lifestyle

## 2021-03-18 NOTE — PROGRESS NOTES
Assessment/Plan:    Type 2 diabetes mellitus without complication [facility])    Lab Results   Component Value Date    HGBA1C 6 5 (H) 03/10/2021     Diabetes type 2 -   Lab Results   Component Value Date    HGBA1C 6 5 (H) 03/10/2021      Patient is controlled  Medication changes include none  Eye exam UTD - no   Foot exam UTD - yes  ACEi - yes  Statin - yes  Discussed diet and exercise as part of the diabetic lifestyle        a      Subjective:      Patient ID: Juan Fields is a 77 y o  female  Overall feeling ok  Here for routine follow up  She has been feeling a little down due to [place] but is able to start seeing her family again and she thinks that will help  Diabetes Mellitus type 2 - denies polyuria, polydipsia, cp/SOB  Compliant with medications  Home blood sugar monitoring  Hypertension - denies CP/SOB/HA  Compliant with medications  No known episodes of hypotension  The following portions of the patient's history were reviewed and updated as appropriate:   She  has a past medical history of Abnormal mammogram of right breast (06/29/2017), Acute medial meniscal tear, left, initial encounter (06/29/2017), Arthritis, Generalized anxiety disorder (08/31/2017), GERD (gastroesophageal reflux disease), Herpes zoster (09/26/2016), Hyperlipidemia, Hypertension, Hypokalemia, Left medial knee pain, Localized osteoarthritis of left knee (03/15/2016), Post-traumatic headache (09/19/2017), and Restless leg syndrome    She   Patient Active Problem List    Diagnosis Date Noted    Diarrhea 07/18/2020    Obstructive sleep apnea 04/17/2020    Chronic migraine without aura without status migrainosus, not intractable 04/17/2020    Skin lesion of scalp 12/09/2019    Morbid obesity ([address] ) 05/22/2019    Left hip pain 03/07/2019    Chronic fatigue 12/20/2018    Pain of both hip joints 12/20/2018    JAYA and COPD overlap syndrome ([address] ) 10/19/2018    Dry mouth 10/19/2018    Screening for osteoporosis 08/16/2018    Colon polyps 03/22/2018    Diverticulosis 03/22/2018    Peripheral edema 03/20/2018    Stabbing headache 02/19/2018    Type 2 diabetes mellitus without complication ([facility] ) 60/97/4987    Healthcare maintenance 02/01/2018    Neck pain 10/23/2017    Bilateral occipital neuralgia 09/19/2017    Breast nodule 02/15/2017    Generalized anxiety disorder 09/26/2016    Bilateral hearing loss 08/05/2016    Left medial knee pain     Primary localized osteoarthritis of left knee 03/15/2016    Restless legs syndrome 10/01/2015    Esophageal reflux 06/04/2013    Hyperlipidemia 02/04/2013    Benign essential hypertension 06/27/2012     Current Outpatient Medications   Medication Sig Dispense Refill    aspirin 81 MG tablet Take 81 mg by mouth daily   atorvastatin (LIPITOR) 10 mg tablet TAKE 1 TABLET BY MOUTH EVERY DAY 90 tablet 1    fluorouracil (EFUDEX) 5 % cream Apply 1 application topically daily  1    gabapentin (NEURONTIN) 400 mg capsule Take 1 capsule (400 mg total) by mouth daily at bedtime 90 capsule 3    Klor-Con M10 10 MEQ tablet TAKE 1 TABLET (10 MEQ TOTAL) BY MOUTH 2 (TWO) TIMES A DAY FOR 90 DAYS 180 tablet 1    losartan (COZAAR) 50 mg tablet TAKE 1 TABLET BY MOUTH EVERY DAY 90 tablet 0    metFORMIN (GLUCOPHAGE-XR) 500 mg 24 hr tablet Take 1 tablet (500 mg total) by mouth daily with dinner 90 tablet 1    omeprazole (PriLOSEC) 10 mg delayed release capsule Take 1 capsule (10 mg total) by mouth daily 30 capsule 3    triamterene-hydrochlorothiazide (DYAZIDE) 37 5-25 mg per capsule TAKE 1 CAPSULE BY MOUTH EVERY DAY 90 capsule 1    verapamil (CALAN) 40 mg tablet TAKE 1 TABLET BY MOUTH THREE TIMES A  tablet 1    dorzolamide-timolol (COSOPT) 22 3-6 8 MG/ML ophthalmic solution INSTILL 1 DROP INTO BOTH EYES TWICE A DAY       No current facility-administered medications for this visit  She is allergic to penicillins       Review of Systems   Constitutional: Negative for activity change, chills, fever and unexpected weight change  HENT: Negative for congestion  Eyes: Negative for visual disturbance  Respiratory: Negative for cough, chest tightness, shortness of breath and wheezing  Cardiovascular: Negative for chest pain  Gastrointestinal: Negative for abdominal pain, diarrhea and nausea  Endocrine: Negative for cold intolerance and heat intolerance  Musculoskeletal: Negative for arthralgias and myalgias  Skin: Negative for color change  Neurological: Negative for dizziness  Psychiatric/Behavioral: Negative for agitation, dysphoric mood and sleep disturbance  Objective:      /80 (BP Location: Left arm, Patient Position: Sitting, Cuff Size: Large)   Pulse 78   Temp 98 °F (36 7 °C) (Temporal)   Resp 14   Ht 5' 2" (1 575 m)   Wt 94 3 kg (207 lb 12 8 oz)   BMI 38 01 kg/m²          Physical Exam  Constitutional:       Appearance: She is well-developed  HENT:      Head: Normocephalic and atraumatic  Cardiovascular:      Rate and Rhythm: Normal rate and regular rhythm  Heart sounds: Normal heart sounds  Pulmonary:      Effort: Pulmonary effort is normal       Breath sounds: Normal breath sounds  Abdominal:      General: Bowel sounds are normal       Palpations: Abdomen is soft  Musculoskeletal: Normal range of motion  Skin:     General: Skin is warm and dry  Neurological:      Mental Status: She is alert and oriented to person, place, and time     Psychiatric:         Behavior: Behavior normal          Judgment: Judgment normal

## 2021-03-19 DIAGNOSIS — I10 HYPERTENSION, UNSPECIFIED TYPE: ICD-10-CM

## 2021-03-19 RX ORDER — LOSARTAN POTASSIUM 50 MG/1
TABLET ORAL
Qty: 90 TABLET | Refills: 0 | Status: SHIPPED | OUTPATIENT
Start: 2021-03-19 | End: 2021-06-15

## 2021-03-22 DIAGNOSIS — I10 ESSENTIAL HYPERTENSION: ICD-10-CM

## 2021-03-22 RX ORDER — TRIAMTERENE AND HYDROCHLOROTHIAZIDE 37.5; 25 MG/1; MG/1
CAPSULE ORAL
Qty: 90 CAPSULE | Refills: 1 | Status: SHIPPED | OUTPATIENT
Start: 2021-03-22 | End: 2021-09-13

## 2021-04-18 DIAGNOSIS — K21.9 GASTROESOPHAGEAL REFLUX DISEASE WITHOUT ESOPHAGITIS: ICD-10-CM

## 2021-04-20 RX ORDER — OMEPRAZOLE 20 MG/1
CAPSULE, DELAYED RELEASE ORAL
Qty: 90 CAPSULE | Refills: 0 | Status: SHIPPED | OUTPATIENT
Start: 2021-04-20 | End: 2022-04-07

## 2021-04-21 DIAGNOSIS — I10 BENIGN ESSENTIAL HYPERTENSION: ICD-10-CM

## 2021-04-21 DIAGNOSIS — E11.9 TYPE 2 DIABETES MELLITUS WITHOUT COMPLICATION, WITHOUT LONG-TERM CURRENT USE OF INSULIN (HCC): ICD-10-CM

## 2021-04-21 RX ORDER — METFORMIN HYDROCHLORIDE 500 MG/1
TABLET, EXTENDED RELEASE ORAL
Qty: 90 TABLET | Refills: 1 | Status: SHIPPED | OUTPATIENT
Start: 2021-04-21 | End: 2021-10-20

## 2021-04-21 RX ORDER — POTASSIUM CHLORIDE 750 MG/1
TABLET, EXTENDED RELEASE ORAL
Qty: 180 TABLET | Refills: 1 | Status: SHIPPED | OUTPATIENT
Start: 2021-04-21 | End: 2021-10-20

## 2021-06-15 DIAGNOSIS — K21.9 GASTROESOPHAGEAL REFLUX DISEASE WITHOUT ESOPHAGITIS: ICD-10-CM

## 2021-06-15 DIAGNOSIS — I10 HYPERTENSION, UNSPECIFIED TYPE: ICD-10-CM

## 2021-06-15 RX ORDER — OMEPRAZOLE 10 MG/1
CAPSULE, DELAYED RELEASE ORAL
Qty: 90 CAPSULE | Refills: 1 | Status: SHIPPED | OUTPATIENT
Start: 2021-06-15 | End: 2021-12-15

## 2021-06-15 RX ORDER — LOSARTAN POTASSIUM 50 MG/1
TABLET ORAL
Qty: 90 TABLET | Refills: 0 | Status: SHIPPED | OUTPATIENT
Start: 2021-06-15 | End: 2021-09-10

## 2021-06-29 ENCOUNTER — OFFICE VISIT (OUTPATIENT)
Dept: PODIATRY | Facility: CLINIC | Age: 76
End: 2021-06-29
Payer: MEDICARE

## 2021-06-29 VITALS
SYSTOLIC BLOOD PRESSURE: 121 MMHG | WEIGHT: 203 LBS | BODY MASS INDEX: 37.36 KG/M2 | HEART RATE: 69 BPM | DIASTOLIC BLOOD PRESSURE: 83 MMHG | HEIGHT: 62 IN

## 2021-06-29 DIAGNOSIS — E11.9 TYPE 2 DIABETES MELLITUS WITHOUT COMPLICATION, WITHOUT LONG-TERM CURRENT USE OF INSULIN (HCC): Primary | ICD-10-CM

## 2021-06-29 DIAGNOSIS — M19.071 PRIMARY OSTEOARTHRITIS, RIGHT ANKLE AND FOOT: ICD-10-CM

## 2021-06-29 PROCEDURE — 99213 OFFICE O/P EST LOW 20 MIN: CPT | Performed by: PODIATRIST

## 2021-06-29 PROCEDURE — 20605 DRAIN/INJ JOINT/BURSA W/O US: CPT | Performed by: PODIATRIST

## 2021-06-29 RX ORDER — TRIAMCINOLONE ACETONIDE 40 MG/ML
40 INJECTION, SUSPENSION INTRA-ARTICULAR; INTRAMUSCULAR
Status: SHIPPED | OUTPATIENT
Start: 2021-06-29

## 2021-06-29 RX ORDER — LIDOCAINE HYDROCHLORIDE 10 MG/ML
2 INJECTION, SOLUTION INFILTRATION; PERINEURAL
Status: SHIPPED | OUTPATIENT
Start: 2021-06-29

## 2021-06-29 RX ADMIN — LIDOCAINE HYDROCHLORIDE 2 ML: 10 INJECTION, SOLUTION INFILTRATION; PERINEURAL at 10:59

## 2021-06-29 RX ADMIN — TRIAMCINOLONE ACETONIDE 40 MG: 40 INJECTION, SUSPENSION INTRA-ARTICULAR; INTRAMUSCULAR at 10:59

## 2021-06-29 NOTE — PATIENT INSTRUCTIONS
Foot Care for People with Diabetes     AMBULATORY CARE:   What you need to know about foot care:   · Foot care helps protect your feet and prevent foot ulcers or sores  Long-term high blood sugar levels can damage the blood vessels and nerves in your legs and feet  This damage makes it hard to feel pressure, pain, temperature, and touch  You may not be able to feel a cut or sore, or shoes that are too tight  Foot care is needed to prevent serious problems, such as an infection or amputation  · Diabetes may cause your toes to become crooked or curved under  These changes may affect the way you walk and can lead to increased pressure on your foot  The pressure can decrease blood flow to your feet  Lack of blood flow increases your risk for a foot ulcer  Do not ignore small problems, such as dry skin or small wounds  These can become life-threatening over time without proper care  Call your care team provider if:   · Your feet become numb, weak, or hard to move  · You have pus draining from a sore on your foot  · You have a wound on your foot that gets bigger, deeper, or does not heal      · You see blisters, cuts, scratches, calluses, or sores on your foot  · You have a fever, and your feet become red, warm, and swollen  · Your toenails become thick, curled, or yellow  · You find it hard to check your feet because your vision is poor  · You have questions or concerns about your condition or care  How to care for your feet:   · Check your feet each day  Look at your whole foot, including the bottom, and between and under your toes  Check for wounds, corns, and calluses  Use a mirror to see the bottom of your feet  The skin on your feet may be shiny, tight, or darker than normal  Your feet may also be cold and pale  Feel your feet by running your hands along the tops, bottoms, sides, and between your toes   Redness, swelling, and warmth are signs of blood flow problems that can lead to a foot ulcer  Do not try to remove corns or calluses yourself  · Wash your feet each day with soap and warm water  Do not use hot water, because this can injure your foot  Dry your feet gently with a towel after you wash them  Dry between and under your toes  · Apply lotion or a moisturizer on your dry feet  Ask your care team provider what lotions are best to use  Do not put lotion or moisturizer between your toes  Moisture between your toes could lead to skin breakdown  · Cut your toenails correctly  File or cut your toenails straight across  Use a soft brush to clean around your toenails  If your toenails are very thick, you may need to have a care team provider or specialist cut them  · Protect your feet  Do not walk barefoot or wear your shoes without socks  Check your shoes for rocks or other objects that can hurt your feet  Wear cotton socks to help keep your feet dry  Wear socks without toe seams, or wear them with the seams inside out  Change your socks each day  Do not wear socks that are dirty or damp  · Wear shoes that fit well  Wear shoes that do not rub against any area of your feet  Your shoes should be ½ to ¾ inch (1 to 2 centimeters) longer than your feet  Your shoes should also have extra space around the widest part of your feet  Walking or athletic shoes with laces or straps that adjust are best  Ask your care team provider for help to choose shoes that fit you best  Ask him or her if you need to wear an insert, orthotic, or bandage on your feet  · Go to your follow-up visits  Your care team provider will do a foot exam at least once a year  You may need a foot exam more often if you have nerve damage, foot deformities, or ulcers  He will check for nerve damage and how well you can feel your feet  He will check your shoes to see if they fit well  · Do not smoke  Smoking can damage your blood vessels and put you at increased risk for foot ulcers   Ask your care team provider for information if you currently smoke and need help to quit  E-cigarettes or smokeless tobacco still contain nicotine  Talk to your care team provider before you use these products  Follow up with your diabetes care team provider or foot specialist as directed: You will need to have your feet checked at least once a year  You may need a foot exam more often if you have nerve damage, foot deformities, or ulcers  Write down your questions so you remember to ask them during your visits  © Copyright [address] Information is for End User's use only and may not be sold, redistributed or otherwise used for commercial purposes  All illustrations and images included in CareNotes® are the copyrighted property of [employer]  or [address] [place], [place] [address]clarisse   The above information is an  only  It is not intended as medical advice for individual conditions or treatments  Talk to your doctor, nurse or pharmacist before following any medical regimen to see if it is safe and effective for you

## 2021-06-29 NOTE — PROGRESS NOTES
PATIENT:  Pato Brandon  1945       ASSESSMENT:     1  Type 2 diabetes mellitus without complication, without long-term current use of insulin ([address] )     2  Primary osteoarthritis, right ankle and foot  Medium joint arthrocentesis             PLAN:  Patient was counseled and educated on the condition and the diagnosis  The diagnosis, treatment options and prognosis were discussed with the patient  Treatment options were discussed and the patient wished to proceed with an injection  Injection was given as in the procedure  Instructed supportive care, icing, and resting  Possible sinus tarsi injection depending on the progress  Educated disease prevention and risks related to diabetes  Educated proper daily foot care and exam   Instructed proper skin care / protection and footwear  The recent blood work was reviewed and the last HbA1c was 6 5  Discussed proper blood glucose control with diet and exercise  RA in 4 weeks  Medium joint arthrocentesis  Universal Protocol:  Consent: Verbal consent obtained  Risks and benefits: risks, benefits and alternatives were discussed  Consent given by: patient  Time out: Immediately prior to procedure a "time out" was called to verify the correct patient, procedure, equipment, support staff and site/side marked as required    Timeout called at: 6/29/2021 10:59 AM   Patient understanding: patient states understanding of the procedure being performed  Site marked: the operative site was marked  Patient identity confirmed: verbally with patient    Supporting Documentation  Indications: pain   Procedure Details  Location: ankle -   Needle size: 25 G  Ultrasound guidance: no  Medications administered: 40 mg triamcinolone acetonide 40 mg/mL; 2 mL lidocaine 1 %    Patient tolerance: patient tolerated the procedure well with no immediate complications  Dressing:  Sterile dressing applied             Subjective:       HPI  The patient presents for chief complaint of right ankle pain  She has increased pain in right ankle in the last couple of months  No injury  No increased edema  She has diabetes and her BS is under control with oral medication  No significant numbness in her feet  Mild tingling sensation on occasion  The following portions of the patient's history were reviewed and updated as appropriate: allergies, current medications, past family history, past medical history, past social history, past surgical history and problem list   All pertinent labs and images were reviewed  Past Medical History  Past Medical History:   Diagnosis Date    Abnormal mammogram of right breast 06/29/2017    Acute medial meniscal tear, left, initial encounter 06/29/2017    Arthritis     Generalized anxiety disorder 08/31/2017    GERD (gastroesophageal reflux disease)     Herpes zoster 09/26/2016    Hyperlipidemia     Hypertension     Hypokalemia     Left medial knee pain     Localized osteoarthritis of left knee 03/15/2016    Post-traumatic headache 09/19/2017    Restless leg syndrome        Past Surgical History  Past Surgical History:   Procedure Laterality Date    ANKLE FRACTURE SURGERY Right     ARTHROSCOPY KNEE Left 5/26/2016    Procedure: ARTHROSCOPY KNEE, PARTILA MEDIAL MENISECTOMY;  Surgeon: Noemi Rangel MD;  Location: [facility];  Service:    Deion Calvillo CHOLECYSTECTOMY      HYSTERECTOMY      65    OOPHORECTOMY      57    SQUAMOUS CELL CARCINOMA EXCISION      forehead        Allergies:  Penicillins    Medications:  Current Outpatient Medications   Medication Sig Dispense Refill    aspirin 81 MG tablet Take 81 mg by mouth daily        atorvastatin (LIPITOR) 10 mg tablet TAKE 1 TABLET BY MOUTH EVERY DAY 90 tablet 1    dorzolamide-timolol (COSOPT) 22 3-6 8 MG/ML ophthalmic solution INSTILL 1 DROP INTO BOTH EYES TWICE A DAY      fluorouracil (EFUDEX) 5 % cream Apply 1 application topically daily  1    gabapentin (NEURONTIN) 400 mg capsule Take 1 capsule (400 mg total) by mouth daily at bedtime 90 capsule 3    Klor-Con M10 10 MEQ tablet TAKE 1 TABLET (10 MEQ TOTAL) BY MOUTH 2 (TWO) TIMES A DAY FOR 90 DAYS 180 tablet 1    losartan (COZAAR) 50 mg tablet TAKE 1 TABLET BY MOUTH EVERY DAY 90 tablet 0    metFORMIN (GLUCOPHAGE-XR) 500 mg 24 hr tablet TAKE 1 TABLET BY MOUTH EVERY DAY WITH DINNER 90 tablet 1    omeprazole (PriLOSEC) 10 mg delayed release capsule TAKE 1 CAPSULE BY MOUTH EVERY DAY 90 capsule 1    omeprazole (PriLOSEC) 20 mg delayed release capsule TAKE 1 CAPSULE BY MOUTH EVERY DAY 90 capsule 0    triamterene-hydrochlorothiazide (DYAZIDE) 37 5-25 mg per capsule TAKE 1 CAPSULE BY MOUTH EVERY DAY 90 capsule 1    verapamil (CALAN) 40 mg tablet TAKE 1 TABLET BY MOUTH THREE TIMES A  tablet 1     No current facility-administered medications for this visit  Social History:  Social History     Socioeconomic History    Marital status: /Civil Union     Spouse name: None    Number of children: None    Years of education: None    Highest education level: None   Occupational History    None   Tobacco Use    Smoking status: Never Smoker    Smokeless tobacco: Never Used   Vaping Use    Vaping Use: Never used   Substance and Sexual Activity    Alcohol use: Yes     Comment: rare    Drug use: No    Sexual activity: Not Currently     Partners: Male   Other Topics Concern    None   Social History Narrative    None     Social Determinants of Health     Financial Resource Strain: Low Risk     Difficulty of Paying Living Expenses: Not hard at all   Food Insecurity: No Food Insecurity    Worried About Running Out of Food in the Last Year: Never true    Shorty of Food in the Last Year: Never true   Transportation Needs: No Transportation Needs    Lack of Transportation (Medical): No    Lack of Transportation (Non-Medical):  No   Physical Activity:     Days of Exercise per Week:     Minutes of Exercise per Session:    Stress:  Feeling of Stress :    Social Connections:     Frequency of Communication with Friends and Family:     Frequency of Social Gatherings with Friends and Family:     Attends [employer] Services:     Active Member of Clubs or Organizations:     Attends Club or Organization Meetings:     Marital Status:    Intimate Partner Violence:     Fear of Current or Ex-Partner:     Emotionally Abused:     Physically Abused:     Sexually Abused:           Review of Systems   Constitutional: Negative for appetite change, chills and fever  Respiratory: Negative for cough and shortness of breath  Cardiovascular: Negative for chest pain and palpitations  Gastrointestinal: Negative for diarrhea, nausea and vomiting  Musculoskeletal: Negative for gait problem  Skin: Negative for wound  Neurological: Negative for weakness and numbness  Hematological: Negative  Objective:      /83   Pulse 69   Ht 5' 2" (1 575 m) Comment: verbal  Wt 92 1 kg (203 lb)   BMI 37 13 kg/m²          Physical Exam  Vitals reviewed  Constitutional:       General: She is not in acute distress  Appearance: Normal appearance  She is well-developed  She is not toxic-appearing  Cardiovascular:      Rate and Rhythm: Normal rate and regular rhythm  Pulses: no weak pulses          Dorsalis pedis pulses are 1+ on the right side and 1+ on the left side  Posterior tibial pulses are 1+ on the right side and 1+ on the left side  Comments: No ischemia  Pulmonary:      Effort: Pulmonary effort is normal  No respiratory distress  Musculoskeletal:         General: Tenderness and deformity present  No signs of injury  Right lower leg: Edema present  Left lower leg: Edema present  Comments: Focal pain on right anterior ankle  No acute edema  Ankle ROM intact right  Cocked up right 5th toe  Feet:      Right foot:      Skin integrity: Dry skin present   No ulcer, skin breakdown, erythema or callus  Left foot:      Skin integrity: Dry skin present  No ulcer, skin breakdown, erythema or callus  Skin:     General: Skin is warm and dry  Capillary Refill: Capillary refill takes less than 2 seconds  Coloration: Skin is not cyanotic or mottled  Findings: No abscess, ecchymosis, erythema, petechiae, rash or wound  Nails: There is no clubbing  Neurological:      General: No focal deficit present  Mental Status: She is alert and oriented to person, place, and time  Cranial Nerves: No cranial nerve deficit  Sensory: No sensory deficit  Motor: No weakness  Coordination: Coordination normal    Psychiatric:         Mood and Affect: Mood normal          Behavior: Behavior normal          Thought Content: Thought content normal          Judgment: Judgment normal          Diabetic Foot Exam    Patient's shoes and socks removed  Right Foot/Ankle   Right Foot Inspection  Skin Exam: skin intact and dry skin no callus, no erythema, no maceration, no pre-ulcer, no ulcer and no callus                          Toe Exam: right toe deformityno swelling, no tenderness and erythema  Sensory   Vibration: diminished  Proprioception: intact   Monofilament testing: intact  Vascular  Capillary refills: < 3 seconds  The right DP pulse is 1+  The right PT pulse is 1+  Left Foot/Ankle  Left Foot Inspection  Skin Exam: skin intact and dry skinno erythema, no maceration, no pre-ulcer, no ulcer and no callus                         Toe Exam: left toe deformityno swelling, no tenderness and no erythema                   Sensory   Vibration: diminished  Proprioception: intact  Monofilament: intact  Vascular  Capillary refills: < 3 seconds  The left DP pulse is 1+  The left PT pulse is 1+  Assign Risk Category:  Deformity present; No loss of protective sensation;  No weak pulses       Risk: 1

## 2021-07-07 DIAGNOSIS — G44.85 STABBING HEADACHE: ICD-10-CM

## 2021-07-07 DIAGNOSIS — M54.81 BILATERAL OCCIPITAL NEURALGIA: ICD-10-CM

## 2021-07-07 RX ORDER — VERAPAMIL HYDROCHLORIDE 40 MG/1
TABLET ORAL
Qty: 270 TABLET | Refills: 1 | Status: SHIPPED | OUTPATIENT
Start: 2021-07-07 | End: 2021-09-16

## 2021-08-10 ENCOUNTER — HOSPITAL ENCOUNTER (OUTPATIENT)
Dept: RADIOLOGY | Age: 76
Discharge: HOME | End: 2021-08-10
Payer: MEDICARE

## 2021-08-10 VITALS — BODY MASS INDEX: 37.73 KG/M2 | HEIGHT: 62 IN | WEIGHT: 205 LBS

## 2021-08-10 DIAGNOSIS — Z12.31 ENCOUNTER FOR SCREENING MAMMOGRAM FOR MALIGNANT NEOPLASM OF BREAST: ICD-10-CM

## 2021-08-10 DIAGNOSIS — Z12.39 SCREENING FOR BREAST CANCER: ICD-10-CM

## 2021-08-10 PROCEDURE — 77063 BREAST TOMOSYNTHESIS BI: CPT

## 2021-08-10 PROCEDURE — 77067 SCR MAMMO BI INCL CAD: CPT

## 2021-08-27 DIAGNOSIS — E78.5 HYPERLIPIDEMIA, UNSPECIFIED HYPERLIPIDEMIA TYPE: ICD-10-CM

## 2021-08-27 RX ORDER — ATORVASTATIN CALCIUM 10 MG/1
TABLET, FILM COATED ORAL
Qty: 90 TABLET | Refills: 1 | Status: SHIPPED | OUTPATIENT
Start: 2021-08-27 | End: 2021-12-15

## 2021-09-06 DIAGNOSIS — G25.81 RESTLESS LEGS SYNDROME: ICD-10-CM

## 2021-09-07 RX ORDER — GABAPENTIN 400 MG/1
400 CAPSULE ORAL
Qty: 90 CAPSULE | Refills: 0 | Status: SHIPPED | OUTPATIENT
Start: 2021-09-07 | End: 2021-12-30 | Stop reason: SDUPTHER

## 2021-09-10 DIAGNOSIS — I10 HYPERTENSION, UNSPECIFIED TYPE: ICD-10-CM

## 2021-09-10 RX ORDER — LOSARTAN POTASSIUM 50 MG/1
TABLET ORAL
Qty: 90 TABLET | Refills: 0 | Status: SHIPPED | OUTPATIENT
Start: 2021-09-10 | End: 2021-12-13

## 2021-09-13 ENCOUNTER — OFFICE VISIT (OUTPATIENT)
Dept: SLEEP CENTER | Facility: CLINIC | Age: 76
End: 2021-09-13
Payer: MEDICARE

## 2021-09-13 VITALS
WEIGHT: 199.8 LBS | SYSTOLIC BLOOD PRESSURE: 124 MMHG | DIASTOLIC BLOOD PRESSURE: 72 MMHG | HEIGHT: 62 IN | BODY MASS INDEX: 36.77 KG/M2

## 2021-09-13 DIAGNOSIS — K21.9 GASTROESOPHAGEAL REFLUX DISEASE, UNSPECIFIED WHETHER ESOPHAGITIS PRESENT: ICD-10-CM

## 2021-09-13 DIAGNOSIS — R40.0 DAYTIME SLEEPINESS: ICD-10-CM

## 2021-09-13 DIAGNOSIS — R68.2 DRY MOUTH: ICD-10-CM

## 2021-09-13 DIAGNOSIS — I10 ESSENTIAL HYPERTENSION: ICD-10-CM

## 2021-09-13 DIAGNOSIS — G25.81 RESTLESS LEGS SYNDROME: ICD-10-CM

## 2021-09-13 DIAGNOSIS — G47.33 OBSTRUCTIVE SLEEP APNEA: Primary | ICD-10-CM

## 2021-09-13 DIAGNOSIS — E66.9 OBESITY (BMI 30-39.9): ICD-10-CM

## 2021-09-13 DIAGNOSIS — I10 BENIGN ESSENTIAL HYPERTENSION: ICD-10-CM

## 2021-09-13 PROCEDURE — 99214 OFFICE O/P EST MOD 30 MIN: CPT | Performed by: INTERNAL MEDICINE

## 2021-09-13 RX ORDER — TRIAMTERENE AND HYDROCHLOROTHIAZIDE 37.5; 25 MG/1; MG/1
CAPSULE ORAL
Qty: 90 CAPSULE | Refills: 1 | Status: SHIPPED | OUTPATIENT
Start: 2021-09-13 | End: 2022-03-14

## 2021-09-13 NOTE — PROGRESS NOTES
Follow-Up Note - [address]  77 y o  female  :1945  [facility]:[id number]  UQL:    CC: I saw this patient for follow-up in clinic today for Sleep disordered breathing, Coexisting Sleep and Medical Problems  She is using a dream Station 1 machine that she got in 2019  She has already registered the device with Damas[employer] Page with respect to the recall  Patient had a split sleep study in 2019: The diagnostic portion demonstrated: AHI of 13 per hour,   and had no stage REM  Intermittent snoring of moderate intensity was noted  Minimum oxygen saturation was 88 %  During the therapeutic portion of the study, his sleep disordered breathing was adequately remediated with nasal CPAP at 10 cm H2O  Mean oxygen saturation was 93% with brief desaturations to a triston of 87%  There were mild periodic limb movements of sleep  PFSH, Problem List, Medications & Allergies were reviewed in EMR  Interval changes: none reported  She  has a past medical history of Abnormal mammogram of right breast (2017), Acute medial meniscal tear, left, initial encounter (2017), Arthritis, Generalized anxiety disorder (2017), GERD (gastroesophageal reflux disease), Herpes zoster (2016), Hyperlipidemia, Hypertension, Hypokalemia, Left medial knee pain, Localized osteoarthritis of left knee (03/15/2016), Post-traumatic headache (2017), and Restless leg syndrome  She has a current medication list which includes the following prescription(s): aspirin, atorvastatin, dorzolamide-timolol, fluorouracil, gabapentin, klor-con m10, losartan, metformin, omeprazole, triamterene-hydrochlorothiazide, verapamil, and omeprazole, and the following Facility-Administered Medications: lidocaine and triamcinolone acetonide  PHYSIOLOGICAL DATA REVIEW AND INTERPRETATION:   using PAP > 4 hours/night 100%  Estimated HERVE 0 4/hour with pressure of 10cm H2O;Compliance:  Within accepted guidelines; Sleep disordered breathing:stable & within target range; Patient has been using ozone based or other devices to sanitize the machine but discontinued use upon learning about the recall  SUBJECTIVE: Regarding use of PAP, Pato Arita reports:   · She is experiencing some adverse effects: dry mouth/throat; has not noticed any fibres or foreign material in air line  · She is benefiting from use: sleeping better and unable to sleep without PAP ; however, she reports snoring when sleeping in the supine position  · Restless leg symptoms are controlled on gabapentin  Sleep Routine: Pato Arita reports getting 6-7 hrs sleep  ; she has no difficulty initiating or maintaining sleep   She arises spontaneously and feels refreshed  Pato Arita reports Excessive Daytime Sleepiness, feels like napping & does when has the opportunity  She rated herself at Total score: 11 /24 on the [place] Sleepiness Scale  Habits: reports that she has never smoked  She has never used smokeless tobacco ,  reports current alcohol use ,  reports no history of drug use , Caffeine use: limited , Exercise routine: none   ROS: reviewed & as attached  Significant for some intentional weight reduction since her last visit  She reported no nasal, respiratory or cardiac symptoms  Acid reflux is controlled on Prilosec  She is requiring less verapamil for her headaches  Isabel Malcolm EXAM: /72   Ht 5' 2" (1 575 m)   Wt 90 6 kg (199 lb 12 8 oz)   BMI 36 54 kg/m²     Patient is well groomed; well appearing  H&N: EOMI; NC/AT:no facial pressure marks, no rashes  Skin/Extrem: col & hydration normal; no edema  Psych: cooperativeand in no distress  Mental state:appears normal   Resp: Respiratory effort is normal  CNS: Alert, orientated, clear & coherent speech  Physical findings otherwise essentially unchanged from previous  IMPRESSION: Problem List Items & Comorbidities Addressed this Visit    1   Obstructive sleep apnea  PAP DME Pressure Change    PAP DME Resupply/Reorder   2  Restless legs syndrome     3  Daytime sleepiness     4  Dry mouth     5  Benign essential hypertension     6  Obesity (BMI 30-39 9)     7  Gastroesophageal reflux disease, unspecified whether esophagitis present         PLAN:  I reviewed results of prior studies and physiologic data with the patient  Notified regarding recall of [employer] devices and their recommendation to discontinue use pending resolution of degradation of the foam    Risks of discontinuing PAP vs continuing use while awaiting resolution of the recall were explained and patient indicates understanding  I discussed treatment options with risks and benefits  Procedure for registering machine with [employer] provided  & instructed patients who have, to track progress/updates on Tatyana Morales  Patient elected to  continue using Pap while awaiting remediation  In the interim, she will revert to using her old [address] of equipment, methods to improve comfort using PAP and importance of compliance with therapy were discussed  Instructed not to use ozone based or other unapproved  cleaning devices  Pressure settin cmH2O  Rx provided to replace  supplies and Care coordinated with DME provider  Multi component Cognitive behavioral therapy for Insomnia undertaken - Sleep Restriction, Stimulus control, Relaxation techniques and Sleep hygiene were discussed  Continue gabapentin 400 mg q h s  Encouraged to persist with strategies for weight reduction  Follow-up is advised in 1 year or sooner if needed to monitor progress, compliance and to adjust therapy  Thank you for allowing me to participate in the care of this patient  Sincerely,    Authenticated electronically by Mc Myrick MD on    Board Certified Specialist     Portions of the record may have been created with voice recognition software   Occasional wrong word or "sound a like" substitutions may have occurred due to the inherent limitations of voice recognition software  There may also be notations and random deletions of words or characters from malfunctioning software  Read the chart carefully and recognize, using context, where substitutions/deletions have occurred

## 2021-09-13 NOTE — PATIENT INSTRUCTIONS

## 2021-09-14 ENCOUNTER — APPOINTMENT (OUTPATIENT)
Dept: LAB | Facility: CLINIC | Age: 76
End: 2021-09-14
Payer: MEDICARE

## 2021-09-14 ENCOUNTER — TELEPHONE (OUTPATIENT)
Dept: SLEEP CENTER | Facility: CLINIC | Age: 76
End: 2021-09-14

## 2021-09-14 DIAGNOSIS — E11.9 TYPE 2 DIABETES MELLITUS WITHOUT COMPLICATION, WITHOUT LONG-TERM CURRENT USE OF INSULIN (HCC): ICD-10-CM

## 2021-09-14 DIAGNOSIS — Z00.00 HEALTHCARE MAINTENANCE: ICD-10-CM

## 2021-09-14 LAB
ALBUMIN SERPL BCP-MCNC: 4 G/DL (ref 3.5–5)
ALP SERPL-CCNC: 79 U/L (ref 46–116)
ALT SERPL W P-5'-P-CCNC: 37 U/L (ref 12–78)
ANION GAP SERPL CALCULATED.3IONS-SCNC: 12 MMOL/L (ref 4–13)
AST SERPL W P-5'-P-CCNC: 23 U/L (ref 5–45)
BASOPHILS # BLD AUTO: 0.05 THOUSANDS/ΜL (ref 0–0.1)
BASOPHILS NFR BLD AUTO: 1 % (ref 0–1)
BILIRUB SERPL-MCNC: 0.63 MG/DL (ref 0.2–1)
BUN SERPL-MCNC: 15 MG/DL (ref 5–25)
CALCIUM SERPL-MCNC: 9.4 MG/DL (ref 8.3–10.1)
CHLORIDE SERPL-SCNC: 100 MMOL/L (ref 100–108)
CHOLEST SERPL-MCNC: 120 MG/DL (ref 50–200)
CO2 SERPL-SCNC: 28 MMOL/L (ref 21–32)
CREAT SERPL-MCNC: 0.92 MG/DL (ref 0.6–1.3)
EOSINOPHIL # BLD AUTO: 0.2 THOUSAND/ΜL (ref 0–0.61)
EOSINOPHIL NFR BLD AUTO: 3 % (ref 0–6)
ERYTHROCYTE [DISTWIDTH] IN BLOOD BY AUTOMATED COUNT: 13.3 % (ref 11.6–15.1)
EST. AVERAGE GLUCOSE BLD GHB EST-MCNC: 134 MG/DL
GFR SERPL CREATININE-BSD FRML MDRD: 61 ML/MIN/1.73SQ M
GLUCOSE P FAST SERPL-MCNC: 140 MG/DL (ref 65–99)
HBA1C MFR BLD: 6.3 %
HCT VFR BLD AUTO: 41.4 % (ref 34.8–46.1)
HDLC SERPL-MCNC: 53 MG/DL
HGB BLD-MCNC: 13.6 G/DL (ref 11.5–15.4)
IMM GRANULOCYTES # BLD AUTO: 0.04 THOUSAND/UL (ref 0–0.2)
IMM GRANULOCYTES NFR BLD AUTO: 1 % (ref 0–2)
LDLC SERPL CALC-MCNC: 41 MG/DL (ref 0–100)
LYMPHOCYTES # BLD AUTO: 2.3 THOUSANDS/ΜL (ref 0.6–4.47)
LYMPHOCYTES NFR BLD AUTO: 32 % (ref 14–44)
MCH RBC QN AUTO: 31.4 PG (ref 26.8–34.3)
MCHC RBC AUTO-ENTMCNC: 32.9 G/DL (ref 31.4–37.4)
MCV RBC AUTO: 96 FL (ref 82–98)
MONOCYTES # BLD AUTO: 0.74 THOUSAND/ΜL (ref 0.17–1.22)
MONOCYTES NFR BLD AUTO: 10 % (ref 4–12)
NEUTROPHILS # BLD AUTO: 3.97 THOUSANDS/ΜL (ref 1.85–7.62)
NEUTS SEG NFR BLD AUTO: 53 % (ref 43–75)
NONHDLC SERPL-MCNC: 67 MG/DL
NRBC BLD AUTO-RTO: 0 /100 WBCS
PLATELET # BLD AUTO: 336 THOUSANDS/UL (ref 149–390)
PMV BLD AUTO: 10.1 FL (ref 8.9–12.7)
POTASSIUM SERPL-SCNC: 3.4 MMOL/L (ref 3.5–5.3)
PROT SERPL-MCNC: 7.6 G/DL (ref 6.4–8.2)
RBC # BLD AUTO: 4.33 MILLION/UL (ref 3.81–5.12)
SODIUM SERPL-SCNC: 140 MMOL/L (ref 136–145)
TRIGL SERPL-MCNC: 128 MG/DL
WBC # BLD AUTO: 7.3 THOUSAND/UL (ref 4.31–10.16)

## 2021-09-14 PROCEDURE — 36415 COLL VENOUS BLD VENIPUNCTURE: CPT

## 2021-09-14 PROCEDURE — 80053 COMPREHEN METABOLIC PANEL: CPT

## 2021-09-14 PROCEDURE — 85025 COMPLETE CBC W/AUTO DIFF WBC: CPT

## 2021-09-14 PROCEDURE — 83036 HEMOGLOBIN GLYCOSYLATED A1C: CPT

## 2021-09-14 PROCEDURE — 80061 LIPID PANEL: CPT

## 2021-09-14 NOTE — TELEPHONE ENCOUNTER
[employer] representative aware of DME pressure change    DME resupply order faxed to [facility]

## 2021-09-16 ENCOUNTER — OFFICE VISIT (OUTPATIENT)
Dept: FAMILY MEDICINE CLINIC | Facility: CLINIC | Age: 76
End: 2021-09-16

## 2021-09-16 VITALS
TEMPERATURE: 97.3 F | DIASTOLIC BLOOD PRESSURE: 88 MMHG | HEART RATE: 69 BPM | WEIGHT: 202 LBS | SYSTOLIC BLOOD PRESSURE: 140 MMHG | HEIGHT: 62 IN | BODY MASS INDEX: 37.17 KG/M2 | RESPIRATION RATE: 18 BRPM | OXYGEN SATURATION: 97 %

## 2021-09-16 DIAGNOSIS — E11.9 TYPE 2 DIABETES MELLITUS WITHOUT COMPLICATION, WITHOUT LONG-TERM CURRENT USE OF INSULIN (HCC): Primary | ICD-10-CM

## 2021-09-16 DIAGNOSIS — N95.9 UNSPECIFIED MENOPAUSAL AND PERIMENOPAUSAL DISORDER: ICD-10-CM

## 2021-09-16 DIAGNOSIS — G44.85 STABBING HEADACHE: ICD-10-CM

## 2021-09-16 DIAGNOSIS — S99.921A INJURY OF RIGHT FOOT, INITIAL ENCOUNTER: ICD-10-CM

## 2021-09-16 DIAGNOSIS — M54.81 BILATERAL OCCIPITAL NEURALGIA: ICD-10-CM

## 2021-09-16 DIAGNOSIS — Z23 ENCOUNTER FOR IMMUNIZATION: ICD-10-CM

## 2021-09-16 DIAGNOSIS — Z13.820 SCREENING FOR OSTEOPOROSIS: ICD-10-CM

## 2021-09-16 PROCEDURE — G0439 PPPS, SUBSEQ VISIT: HCPCS | Performed by: FAMILY MEDICINE

## 2021-09-16 PROCEDURE — 99214 OFFICE O/P EST MOD 30 MIN: CPT | Performed by: FAMILY MEDICINE

## 2021-09-16 PROCEDURE — G0008 ADMIN INFLUENZA VIRUS VAC: HCPCS | Performed by: FAMILY MEDICINE

## 2021-09-16 PROCEDURE — 90662 IIV NO PRSV INCREASED AG IM: CPT | Performed by: FAMILY MEDICINE

## 2021-09-16 RX ORDER — VERAPAMIL HYDROCHLORIDE 40 MG/1
40 TABLET ORAL 2 TIMES DAILY
Qty: 60 TABLET | Refills: 2
Start: 2021-09-16 | End: 2021-12-15

## 2021-09-16 NOTE — PATIENT INSTRUCTIONS
Medicare Preventive Visit Patient Instructions  Thank you for completing your Welcome to Medicare Visit or Medicare Annual Wellness Visit today  Your next wellness visit will be due in one year (9/17/2022)  The screening/preventive services that you may require over the next 5-10 years are detailed below  Some tests may not apply to you based off risk factors and/or age  Screening tests ordered at today's visit but not completed yet may show as past due  Also, please note that scanned in results may not display below  Preventive Screenings:  Service Recommendations Previous Testing/Comments   Colorectal Cancer Screening  * Colonoscopy    * Fecal Occult Blood Test (FOBT)/Fecal Immunochemical Test (FIT)  * Fecal DNA/Cologuard Test  * Flexible Sigmoidoscopy Age: 54-65 years old   Colonoscopy: every 10 years (may be performed more frequently if at higher risk)  OR  FOBT/FIT: every 1 year  OR  Cologuard: every 3 years  OR  Sigmoidoscopy: every 5 years  Screening may be recommended earlier than age 48 if at higher risk for colorectal cancer  Also, an individualized decision between you and your healthcare provider will decide whether screening between the ages of 74-80 would be appropriate  Colonoscopy: 02/06/2018  FOBT/FIT: Not on file  Cologuard: Not on file  Sigmoidoscopy: Not on file          Breast Cancer Screening Age: 36 years old  Frequency: every 1-2 years  Not required if history of left and right mastectomy Mammogram: 08/10/2021        Cervical Cancer Screening Between the ages of 21-29, pap smear recommended once every 3 years  Between the ages of 33-67, can perform pap smear with HPV co-testing every 5 years     Recommendations may differ for women with a history of total hysterectomy, cervical cancer, or abnormal pap smears in past  Pap Smear: 02/19/2020        Hepatitis C Screening Once for adults born between 1945 and 1965  More frequently in patients at high risk for Hepatitis C Hep C Antibody: 08/10/2018        Diabetes Screening 1-2 times per year if you're at risk for diabetes or have pre-diabetes Fasting glucose: 140 mg/dL   A1C: 6 3 %        Cholesterol Screening Once every 5 years if you don't have a lipid disorder  May order more often based on risk factors  Lipid panel: 09/14/2021          Other Preventive Screenings Covered by Medicare:  1  Abdominal Aortic Aneurysm (AAA) Screening: covered once if your at risk  You're considered to be at risk if you have a family history of AAA  2  Lung Cancer Screening: covers low dose CT scan once per year if you meet all of the following conditions: (1) Age 50-69; (2) No signs or symptoms of lung cancer; (3) Current smoker or have quit smoking within the last 15 years; (4) You have a tobacco smoking history of at least 30 pack years (packs per day multiplied by number of years you smoked); (5) You get a written order from a healthcare provider  3  Glaucoma Screening: covered annually if you're considered high risk: (1) You have diabetes OR (2) Family history of glaucoma OR (3)  aged 48 and older OR (3)  American aged 72 and older  3  Osteoporosis Screening: covered every 2 years if you meet one of the following conditions: (1) You're estrogen deficient and at risk for osteoporosis based off medical history and other findings; (2) Have a vertebral abnormality; (3) On glucocorticoid therapy for more than 3 months; (4) Have primary hyperparathyroidism; (5) On osteoporosis medications and need to assess response to drug therapy  · Last bone density test (DXA Scan): Not on file  5  HIV Screening: covered annually if you're between the age of 12-76  Also covered annually if you are younger than 13 and older than 72 with risk factors for HIV infection  For pregnant patients, it is covered up to 3 times per pregnancy      Immunizations:  Immunization Recommendations   Influenza Vaccine Annual influenza vaccination during flu season is recommended for all persons aged >= 6 months who do not have contraindications   Pneumococcal Vaccine (Prevnar and Pneumovax)  * Prevnar = PCV13  * Pneumovax = PPSV23   Adults 25-60 years old: 1-3 doses may be recommended based on certain risk factors  Adults 72 years old: Prevnar (PCV13) vaccine recommended followed by Pneumovax (PPSV23) vaccine  If already received PPSV23 since turning 65, then PCV13 recommended at least one year after PPSV23 dose  Hepatitis B Vaccine 3 dose series if at intermediate or high risk (ex: diabetes, end stage renal disease, liver disease)   Tetanus (Td) Vaccine - COST NOT COVERED BY MEDICARE PART B Following completion of primary series, a booster dose should be given every 10 years to maintain immunity against tetanus  Td may also be given as tetanus wound prophylaxis  Tdap Vaccine - COST NOT COVERED BY MEDICARE PART B Recommended at least once for all adults  For pregnant patients, recommended with each pregnancy  Shingles Vaccine (Shingrix) - COST NOT COVERED BY MEDICARE PART B  2 shot series recommended in those aged 48 and above     Health Maintenance Due:      Topic Date Due    DXA SCAN  Never done    Breast Cancer Screening: Mammogram  08/10/2022    Colorectal Cancer Screening  02/06/2023    Hepatitis C Screening  Completed     Immunizations Due:      Topic Date Due    DTaP,Tdap,and Td Vaccines (1 - Tdap) Never done    Influenza Vaccine (1) 09/01/2021     Advance Directives   What are advance directives? Advance directives are legal documents that state your wishes and plans for medical care  These plans are made ahead of time in case you lose your ability to make decisions for yourself  Advance directives can apply to any medical decision, such as the treatments you want, and if you want to donate organs  What are the types of advance directives? There are many types of advance directives, and each state has rules about how to use them   You may choose a combination of any of the following:  · Living will: This is a written record of the treatment you want  You can also choose which treatments you do not want, which to limit, and which to stop at a certain time  This includes surgery, medicine, IV fluid, and tube feedings  · Durable power of  for healthcare [place] [facility]): This is a written record that states who you want to make healthcare choices for you when you are unable to make them for yourself  This person, called a proxy, is usually a family member or a friend  You may choose more than 1 proxy  · Do not resuscitate (DNR) order:  A DNR order is used in case your heart stops beating or you stop breathing  It is a request not to have certain forms of treatment, such as CPR  A DNR order may be included in other types of advance directives  · Medical directive: This covers the care that you want if you are in a coma, near death, or unable to make decisions for yourself  You can list the treatments you want for each condition  Treatment may include pain medicine, surgery, blood transfusions, dialysis, IV or tube feedings, and a ventilator (breathing machine)  · Values history: This document has questions about your views, beliefs, and how you feel and think about life  This information can help others choose the care that you would choose  Why are advance directives important? An advance directive helps you control your care  Although spoken wishes may be used, it is better to have your wishes written down  Spoken wishes can be misunderstood, or not followed  Treatments may be given even if you do not want them  An advance directive may make it easier for your family to make difficult choices about your care  Weight Management   Why it is important to manage your weight:  Being overweight increases your risk of health conditions such as heart disease, high blood pressure, type 2 diabetes, and certain types of cancer   It can also increase your risk for osteoarthritis, sleep apnea, and other respiratory problems  Aim for a slow, steady weight loss  Even a small amount of weight loss can lower your risk of health problems  How to lose weight safely:  A safe and healthy way to lose weight is to eat fewer calories and get regular exercise  You can lose up about 1 pound a week by decreasing the number of calories you eat by 500 calories each day  Healthy meal plan for weight management:  A healthy meal plan includes a variety of foods, contains fewer calories, and helps you stay healthy  A healthy meal plan includes the following:  · Eat whole-grain foods more often  A healthy meal plan should contain fiber  Fiber is the part of grains, fruits, and vegetables that is not broken down by your body  Whole-grain foods are healthy and provide extra fiber in your diet  Some examples of whole-grain foods are whole-wheat breads and pastas, oatmeal, brown rice, and bulgur  · Eat a variety of vegetables every day  Include dark, leafy greens such as spinach, kale, nancy greens, and mustard greens  Eat yellow and orange vegetables such as carrots, sweet potatoes, and winter squash  · Eat a variety of fruits every day  Choose fresh or canned fruit (canned in its own juice or light syrup) instead of juice  Fruit juice has very little or no fiber  · Eat low-fat dairy foods  Drink fat-free (skim) milk or 1% milk  Eat fat-free yogurt and low-fat cottage cheese  Try low-fat cheeses such as mozzarella and other reduced-fat cheeses  · Choose meat and other protein foods that are low in fat  Choose beans or other legumes such as split peas or lentils  Choose fish, skinless poultry (chicken or turkey), or lean cuts of red meat (beef or pork)  Before you cook meat or poultry, cut off any visible fat  · Use less fat and oil  Try baking foods instead of frying them  Add less fat, such as margarine, sour cream, regular salad dressing and mayonnaise to foods   Eat fewer high-fat foods  Some examples of high-fat foods include french fries, doughnuts, ice cream, and cakes  · Eat fewer sweets  Limit foods and drinks that are high in sugar  This includes candy, cookies, regular soda, and sweetened drinks  Exercise:  Exercise at least 30 minutes per day on most days of the week  Some examples of exercise include walking, biking, dancing, and swimming  You can also fit in more physical activity by taking the stairs instead of the elevator or parking farther away from stores  Ask your healthcare provider about the best exercise plan for you  © Copyright [employer] 2018 Information is for End User's use only and may not be sold, redistributed or otherwise used for commercial purposes  All illustrations and images included in CareNotes® are the copyrighted property of [employer]  or [facility] Preventive Visit Patient Instructions  Thank you for completing your Welcome to Medicare Visit or Medicare Annual Wellness Visit today  Your next wellness visit will be due in one year (9/17/2022)  The screening/preventive services that you may require over the next 5-10 years are detailed below  Some tests may not apply to you based off risk factors and/or age  Screening tests ordered at today's visit but not completed yet may show as past due  Also, please note that scanned in results may not display below  Preventive Screenings:  Service Recommendations Previous Testing/Comments   Colorectal Cancer Screening  * Colonoscopy    * Fecal Occult Blood Test (FOBT)/Fecal Immunochemical Test (FIT)  * Fecal DNA/Cologuard Test  * Flexible Sigmoidoscopy Age: 54-65 years old   Colonoscopy: every 10 years (may be performed more frequently if at higher risk)  OR  FOBT/FIT: every 1 year  OR  Cologuard: every 3 years  OR  Sigmoidoscopy: every 5 years  Screening may be recommended earlier than age 48 if at higher risk for colorectal cancer   Also, an individualized decision between you and your healthcare provider will decide whether screening between the ages of 74-80 would be appropriate  Colonoscopy: 02/06/2018  FOBT/FIT: Not on file  Cologuard: Not on file  Sigmoidoscopy: Not on file          Breast Cancer Screening Age: 36 years old  Frequency: every 1-2 years  Not required if history of left and right mastectomy Mammogram: 08/10/2021        Cervical Cancer Screening Between the ages of 21-29, pap smear recommended once every 3 years  Between the ages of 33-67, can perform pap smear with HPV co-testing every 5 years  Recommendations may differ for women with a history of total hysterectomy, cervical cancer, or abnormal pap smears in past  Pap Smear: 02/19/2020        Hepatitis C Screening Once for adults born between 1945 and 1965  More frequently in patients at high risk for Hepatitis C Hep C Antibody: 08/10/2018        Diabetes Screening 1-2 times per year if you're at risk for diabetes or have pre-diabetes Fasting glucose: 140 mg/dL   A1C: 6 3 %        Cholesterol Screening Once every 5 years if you don't have a lipid disorder  May order more often based on risk factors  Lipid panel: 09/14/2021          Other Preventive Screenings Covered by Medicare:  6  Abdominal Aortic Aneurysm (AAA) Screening: covered once if your at risk  You're considered to be at risk if you have a family history of AAA  7  Lung Cancer Screening: covers low dose CT scan once per year if you meet all of the following conditions: (1) Age 50-69; (2) No signs or symptoms of lung cancer; (3) Current smoker or have quit smoking within the last 15 years; (4) You have a tobacco smoking history of at least 30 pack years (packs per day multiplied by number of years you smoked); (5) You get a written order from a healthcare provider    8  Glaucoma Screening: covered annually if you're considered high risk: (1) You have diabetes OR (2) Family history of glaucoma OR (3)  aged 48 and older OR (4)  American aged 72 and older  5  Osteoporosis Screening: covered every 2 years if you meet one of the following conditions: (1) You're estrogen deficient and at risk for osteoporosis based off medical history and other findings; (2) Have a vertebral abnormality; (3) On glucocorticoid therapy for more than 3 months; (4) Have primary hyperparathyroidism; (5) On osteoporosis medications and need to assess response to drug therapy  · Last bone density test (DXA Scan): Not on file  10  HIV Screening: covered annually if you're between the age of 12-76  Also covered annually if you are younger than 13 and older than 72 with risk factors for HIV infection  For pregnant patients, it is covered up to 3 times per pregnancy  Immunizations:  Immunization Recommendations   Influenza Vaccine Annual influenza vaccination during flu season is recommended for all persons aged >= 6 months who do not have contraindications   Pneumococcal Vaccine (Prevnar and Pneumovax)  * Prevnar = PCV13  * Pneumovax = PPSV23   Adults 25-60 years old: 1-3 doses may be recommended based on certain risk factors  Adults 72 years old: Prevnar (PCV13) vaccine recommended followed by Pneumovax (PPSV23) vaccine  If already received PPSV23 since turning 65, then PCV13 recommended at least one year after PPSV23 dose  Hepatitis B Vaccine 3 dose series if at intermediate or high risk (ex: diabetes, end stage renal disease, liver disease)   Tetanus (Td) Vaccine - COST NOT COVERED BY MEDICARE PART B Following completion of primary series, a booster dose should be given every 10 years to maintain immunity against tetanus  Td may also be given as tetanus wound prophylaxis  Tdap Vaccine - COST NOT COVERED BY MEDICARE PART B Recommended at least once for all adults  For pregnant patients, recommended with each pregnancy     Shingles Vaccine (Shingrix) - COST NOT COVERED BY MEDICARE PART B  2 shot series recommended in those aged 48 and above Health Maintenance Due:      Topic Date Due    DXA SCAN  Never done    Breast Cancer Screening: Mammogram  08/10/2022    Colorectal Cancer Screening  02/06/2023    Hepatitis C Screening  Completed     Immunizations Due:      Topic Date Due    DTaP,Tdap,and Td Vaccines (1 - Tdap) Never done    Influenza Vaccine (1) 09/01/2021     Advance Directives   What are advance directives? Advance directives are legal documents that state your wishes and plans for medical care  These plans are made ahead of time in case you lose your ability to make decisions for yourself  Advance directives can apply to any medical decision, such as the treatments you want, and if you want to donate organs  What are the types of advance directives? There are many types of advance directives, and each state has rules about how to use them  You may choose a combination of any of the following:  · Living will: This is a written record of the treatment you want  You can also choose which treatments you do not want, which to limit, and which to stop at a certain time  This includes surgery, medicine, IV fluid, and tube feedings  · Durable power of  for healthcare [place] [facility]): This is a written record that states who you want to make healthcare choices for you when you are unable to make them for yourself  This person, called a proxy, is usually a family member or a friend  You may choose more than 1 proxy  · Do not resuscitate (DNR) order:  A DNR order is used in case your heart stops beating or you stop breathing  It is a request not to have certain forms of treatment, such as CPR  A DNR order may be included in other types of advance directives  · Medical directive: This covers the care that you want if you are in a coma, near death, or unable to make decisions for yourself  You can list the treatments you want for each condition   Treatment may include pain medicine, surgery, blood transfusions, dialysis, IV or tube feedings, and a ventilator (breathing machine)  · Values history: This document has questions about your views, beliefs, and how you feel and think about life  This information can help others choose the care that you would choose  Why are advance directives important? An advance directive helps you control your care  Although spoken wishes may be used, it is better to have your wishes written down  Spoken wishes can be misunderstood, or not followed  Treatments may be given even if you do not want them  An advance directive may make it easier for your family to make difficult choices about your care  Weight Management   Why it is important to manage your weight:  Being overweight increases your risk of health conditions such as heart disease, high blood pressure, type 2 diabetes, and certain types of cancer  It can also increase your risk for osteoarthritis, sleep apnea, and other respiratory problems  Aim for a slow, steady weight loss  Even a small amount of weight loss can lower your risk of health problems  How to lose weight safely:  A safe and healthy way to lose weight is to eat fewer calories and get regular exercise  You can lose up about 1 pound a week by decreasing the number of calories you eat by 500 calories each day  Healthy meal plan for weight management:  A healthy meal plan includes a variety of foods, contains fewer calories, and helps you stay healthy  A healthy meal plan includes the following:  · Eat whole-grain foods more often  A healthy meal plan should contain fiber  Fiber is the part of grains, fruits, and vegetables that is not broken down by your body  Whole-grain foods are healthy and provide extra fiber in your diet  Some examples of whole-grain foods are whole-wheat breads and pastas, oatmeal, brown rice, and bulgur  · Eat a variety of vegetables every day  Include dark, leafy greens such as spinach, kale, nancy greens, and mustard greens   Eat yellow and orange vegetables such as carrots, sweet potatoes, and winter squash  · Eat a variety of fruits every day  Choose fresh or canned fruit (canned in its own juice or light syrup) instead of juice  Fruit juice has very little or no fiber  · Eat low-fat dairy foods  Drink fat-free (skim) milk or 1% milk  Eat fat-free yogurt and low-fat cottage cheese  Try low-fat cheeses such as mozzarella and other reduced-fat cheeses  · Choose meat and other protein foods that are low in fat  Choose beans or other legumes such as split peas or lentils  Choose fish, skinless poultry (chicken or turkey), or lean cuts of red meat (beef or pork)  Before you cook meat or poultry, cut off any visible fat  · Use less fat and oil  Try baking foods instead of frying them  Add less fat, such as margarine, sour cream, regular salad dressing and mayonnaise to foods  Eat fewer high-fat foods  Some examples of high-fat foods include french fries, doughnuts, ice cream, and cakes  · Eat fewer sweets  Limit foods and drinks that are high in sugar  This includes candy, cookies, regular soda, and sweetened drinks  Exercise:  Exercise at least 30 minutes per day on most days of the week  Some examples of exercise include walking, biking, dancing, and swimming  You can also fit in more physical activity by taking the stairs instead of the elevator or parking farther away from stores  Ask your healthcare provider about the best exercise plan for you  © Copyright [employer] 2018 Information is for End User's use only and may not be sold, redistributed or otherwise used for commercial purposes   All illustrations and images included in CareNotes® are the copyrighted property of [employer]  or [address] [place] [address] [employer] [facility]

## 2021-09-16 NOTE — PROGRESS NOTES
Assessment and Plan:     Problem List Items Addressed This Visit        Endocrine    Type 2 diabetes mellitus without complication ([facility] [address] ) - Primary       Lab Results   Component Value Date    HGBA1C 6 3 (H) 09/14/2021            Relevant Orders    CBC and differential    Comprehensive metabolic panel    Hemoglobin A1C    Lipid panel       Other    Bilateral occipital neuralgia    Relevant Medications    verapamil (CALAN) 40 mg tablet    Stabbing headache    Relevant Medications    verapamil (CALAN) 40 mg tablet    Screening for osteoporosis    Relevant Orders    DXA bone density spine hip and pelvis      Other Visit Diagnoses     Unspecified menopausal and perimenopausal disorder         Relevant Orders    DXA bone density spine hip and pelvis    Injury of right foot, initial encounter        Relevant Orders    XR foot 3+ vw right    Encounter for immunization        Relevant Orders    influenza vaccine, high-dose, PF 0 7 mL (FLUZONE HIGH-DOSE) (Completed)           Preventive health issues were discussed with patient, and age appropriate screening tests were ordered as noted in patient's After Visit Summary  Personalized health advice and appropriate referrals for health education or preventive services given if needed, as noted in patient's After Visit Summary       History of Present Illness:     Patient presents for Medicare Annual Wellness visit    Patient Care Team:  Geneva Short as PCP - General (Family Medicine)  Cheko Browning MD Madelin Harter, MD     Problem List:     Patient Active Problem List   Diagnosis    Left medial knee pain    Benign essential hypertension    Bilateral hearing loss    Bilateral occipital neuralgia    Breast nodule    Neck pain    Esophageal reflux    Generalized anxiety disorder    Hyperlipidemia    Primary localized osteoarthritis of left knee    Restless legs syndrome    Type 2 diabetes mellitus without complication ([facility] [address] )    Healthcare maintenance    Stabbing headache    Peripheral edema    Colon polyps    Diverticulosis    Screening for osteoporosis    JAYA and COPD overlap syndrome (HCC)    Dry mouth    Chronic fatigue    Pain of both hip joints    Left hip pain    Morbid obesity (HCC)    Skin lesion of scalp    Obstructive sleep apnea    Chronic migraine without aura without status migrainosus, not intractable    Diarrhea      Past Medical and Surgical History:     Past Medical History:   Diagnosis Date    Abnormal mammogram of right breast 06/29/2017    Acute medial meniscal tear, left, initial encounter 06/29/2017    Arthritis     Generalized anxiety disorder 08/31/2017    GERD (gastroesophageal reflux disease)     Herpes zoster 09/26/2016    Hyperlipidemia     Hypertension     Hypokalemia     Left medial knee pain     Localized osteoarthritis of left knee 03/15/2016    Post-traumatic headache 09/19/2017    Restless leg syndrome      Past Surgical History:   Procedure Laterality Date    ANKLE FRACTURE SURGERY Right     ARTHROSCOPY KNEE Left 5/26/2016    Procedure: ARTHROSCOPY KNEE, PARTILA MEDIAL MENISECTOMY;  Surgeon: Jeronimo Campuzano MD;  Location: [facility];  Service:    Ardeth Needs CHOLECYSTECTOMY      HYSTERECTOMY      65    OOPHORECTOMY      65    SQUAMOUS CELL CARCINOMA EXCISION      forehead      Family History:     Family History   Problem Relation Age of Onset    Stroke Mother     Stroke Father     Hypertension Other     Diabetes Other     Cerebral aneurysm Sister     Uterine cancer Sister 54    No Known Problems Daughter     No Known Problems Maternal Grandmother     No Known Problems Maternal Grandfather     No Known Problems Paternal Grandmother     No Known Problems Paternal Grandfather     No Known Problems Sister     No Known Problems Maternal Aunt     No Known Problems Maternal Aunt     No Known Problems Maternal Aunt       Social History:     Social History     Socioeconomic History    Marital status: /Civil Union     Spouse name: None    Number of children: None    Years of education: None    Highest education level: None   Occupational History    None   Tobacco Use    Smoking status: Never Smoker    Smokeless tobacco: Never Used   Vaping Use    Vaping Use: Never used   Substance and Sexual Activity    Alcohol use: Yes     Comment: rare    Drug use: No    Sexual activity: Not Currently     Partners: Male   Other Topics Concern    None   Social History Narrative    None     Social Determinants of Health     Financial Resource Strain: Low Risk     Difficulty of Paying Living Expenses: Not hard at all   Food Insecurity: No Food Insecurity    Worried About Running Out of Food in the Last Year: Never true    Shorty of Food in the Last Year: Never true   Transportation Needs: No Transportation Needs    Lack of Transportation (Medical): No    Lack of Transportation (Non-Medical): No   Physical Activity:     Days of Exercise per Week:     Minutes of Exercise per Session:    Stress:     Feeling of Stress :    Social Connections:     Frequency of Communication with Friends and Family:     Frequency of Social Gatherings with Friends and Family:     Attends Worship Services:     Active Member of Clubs or Organizations:     Attends Club or Organization Meetings:     Marital Status:    Intimate Partner Violence:     Fear of Current or Ex-Partner:     Emotionally Abused:     Physically Abused:     Sexually Abused:       Medications and Allergies:     Current Outpatient Medications   Medication Sig Dispense Refill    aspirin 81 MG tablet Take 81 mg by mouth daily        atorvastatin (LIPITOR) 10 mg tablet TAKE 1 TABLET BY MOUTH EVERY DAY 90 tablet 1    dorzolamide-timolol (COSOPT) 22 3-6 8 MG/ML ophthalmic solution INSTILL 1 DROP INTO BOTH EYES TWICE A DAY      fluorouracil (EFUDEX) 5 % cream Apply 1 application topically as needed   1    gabapentin (NEURONTIN) 400 mg capsule Take 1 capsule (400 mg total) by mouth daily at bedtime 90 capsule 0    Klor-Con M10 10 MEQ tablet TAKE 1 TABLET (10 MEQ TOTAL) BY MOUTH 2 (TWO) TIMES A DAY FOR 90 DAYS 180 tablet 1    losartan (COZAAR) 50 mg tablet TAKE 1 TABLET BY MOUTH EVERY DAY 90 tablet 0    metFORMIN (GLUCOPHAGE-XR) 500 mg 24 hr tablet TAKE 1 TABLET BY MOUTH EVERY DAY WITH DINNER 90 tablet 1    omeprazole (PriLOSEC) 10 mg delayed release capsule TAKE 1 CAPSULE BY MOUTH EVERY DAY 90 capsule 1    triamterene-hydrochlorothiazide (DYAZIDE) 37 5-25 mg per capsule TAKE 1 CAPSULE BY MOUTH EVERY DAY 90 capsule 1    verapamil (CALAN) 40 mg tablet Take 1 tablet (40 mg total) by mouth 2 (two) times a day 60 tablet 2    omeprazole (PriLOSEC) 20 mg delayed release capsule TAKE 1 CAPSULE BY MOUTH EVERY DAY 90 capsule 0     Current Facility-Administered Medications   Medication Dose Route Frequency Provider Last Rate Last Admin    lidocaine (XYLOCAINE) 1 % injection 2 mL  2 mL Injection  Kathi London, DPM   2 mL at 06/29/21 1059    triamcinolone acetonide (KENALOG-40) 40 mg/mL injection 40 mg  40 mg Intra-articular  Kathi London, DPM   40 mg at 06/29/21 1059     Allergies   Allergen Reactions    Penicillins Rash     itching      Immunizations:     Immunization History   Administered Date(s) Administered    INFLUENZA 09/26/2016, 11/03/2017, 09/21/2018    Influenza Quadrivalent Preservative Free 3 years and older IM 10/20/2014    Influenza Split High Dose Preservative Free IM 09/26/2016, 10/03/2019    Influenza, high dose seasonal 0 7 mL 09/17/2020, 09/16/2021    Influenza, seasonal, injectable 11/18/2013    Influenza, seasonal, injectable, preservative free 10/01/2015    Pneumococcal Conjugate 13-Valent 01/07/2016    Pneumococcal Polysaccharide PPV23 02/04/2013    SARS-CoV-2 / COVID-19 mRNA IM (Pfizer-BioNTech) 01/21/2021, 02/10/2021    Zoster 05/06/2014      Health Maintenance:         Topic Date Due    DXA SCAN  Never done    Breast Cancer Screening: Mammogram  08/10/2022    Colorectal Cancer Screening  02/06/2023    Hepatitis C Screening  Completed         Topic Date Due    DTaP,Tdap,and Td Vaccines (1 - Tdap) Never done      Medicare Health Risk Assessment:     /88 (BP Location: Left arm, Patient Position: Sitting, Cuff Size: Large)   Pulse 69   Temp (!) 97 3 °F (36 3 °C) (Temporal)   Resp 18   Ht 5' 2" (1 575 m)   Wt 91 6 kg (202 lb)   SpO2 97%   BMI 36 95 kg/m²      Wilda Art is here for her Subsequent Wellness visit  Last Medicare Wellness visit information reviewed, patient interviewed, no change since last AWV  Health Risk Assessment:   Patient rates overall health as good  Patient feels that their physical health rating is same  Patient is satisfied with their life  Eyesight was rated as same  Hearing was rated as same  Patient feels that their emotional and mental health rating is same  Patients states they are never, rarely angry  Patient states they are sometimes unusually tired/fatigued  Pain experienced in the last 7 days has been some  Patient's pain rating has been 2/10  Patient states that she has experienced no weight loss or gain in last 6 months  Fall Risk Screening: In the past year, patient has experienced: no history of falling in past year      Urinary Incontinence Screening:   Patient has leaked urine accidently in the last six months  Home Safety:  Patient does not have trouble with stairs inside or outside of their home  Patient has working smoke alarms and has no working carbon monoxide detector  Home safety hazards include: none  Nutrition:   Current diet is Regular and Limited junk food  Medications:   Patient is currently taking over-the-counter supplements  OTC medications include: aspirin  Patient is able to manage medications       Activities of Daily Living (ADLs)/Instrumental Activities of Daily Living (IADLs):   Walk and transfer into and out of bed and chair?: Yes  Dress and groom yourself?: Yes    Bathe or shower yourself?: Yes    Feed yourself? Yes  Do your laundry/housekeeping?: Yes  Manage your money, pay your bills and track your expenses?: Yes  Make your own meals?: Yes    Do your own shopping?: Yes    Durable Medical Equipment Suppliers  [employer]    Previous Hospitalizations:   Any hospitalizations or ED visits within the last 12 months?: No      Advance Care Planning:   Living will: Yes    Durable POA for healthcare: Yes    Advanced directive: Yes      PREVENTIVE SCREENINGS      Cardiovascular Screening:    General: Screening Not Indicated and History Lipid Disorder      Diabetes Screening:     General: Screening Not Indicated and History Diabetes      Colorectal Cancer Screening:     General: Screening Current      Breast Cancer Screening:     General: Screening Current      Cervical Cancer Screening:    General: Screening Not Indicated      Osteoporosis Screening:    General: Risks and Benefits Discussed    Due for: DXA Axial      Lung Cancer Screening:     General: Screening Not Indicated      Hepatitis C Screening:    General: Screening Current    Screening, Brief Intervention, and Referral to Treatment (SBIRT)    Screening  Typical number of drinks in a day: 0  Typical number of drinks in a week: 0  Interpretation: Low risk drinking behavior  AUDIT-C Screenin) How often did you have a drink containing alcohol in the past year? monthly or less  2) How many drinks did you have on a typical day when you were drinking in the past year?  1 to 2  3) How often did you have 6 or more drinks on one occasion in the past year? never    AUDIT-C Score: 1  Interpretation: Score 0-2 (female): Negative screen for alcohol misuse    Single Item Drug Screening:  How often have you used an illegal drug (including marijuana) or a prescription medication for non-medical reasons in the past year? never    Single Item Drug Screen Score: 0  Interpretation: Negative screen for possible drug use disorder      Job Sally

## 2021-09-16 NOTE — PROGRESS NOTES
Assessment/Plan:    Type 2 diabetes mellitus without complication [facility])    Lab Results   Component Value Date    HGBA1C 6 3 (H) 09/14/2021        a      Subjective:      Patient ID: Keith Smith is a 77 y o  female  Foot injury- tripped in garage - did not fall but injured right foot  Has tenderness with walking  No pain with sitting  Tender to touch  Blood work - was concerned about slightly elevated microalbumin and increased blood sugar    Diabetes Mellitus type 2 - denies polyuria, polydipsia, cp/SOB  Compliant with medications  Home blood sugar monitoring  verapmil dose was decreased  Baby ASA - patient is wondering if she should stop taking this because she has noted some red marks on her face when waking up and what sounds like easy bruising and one of her friends said that might be from the aspirin  The following portions of the patient's history were reviewed and updated as appropriate:   She  has a past medical history of Abnormal mammogram of right breast (06/29/2017), Acute medial meniscal tear, left, initial encounter (06/29/2017), Arthritis, Generalized anxiety disorder (08/31/2017), GERD (gastroesophageal reflux disease), Herpes zoster (09/26/2016), Hyperlipidemia, Hypertension, Hypokalemia, Left medial knee pain, Localized osteoarthritis of left knee (03/15/2016), Post-traumatic headache (09/19/2017), and Restless leg syndrome    She   Patient Active Problem List    Diagnosis Date Noted    Dizziness 11/23/2021    Diarrhea 07/18/2020    Obstructive sleep apnea 04/17/2020    Chronic migraine without aura without status migrainosus, not intractable 04/17/2020    Skin lesion of scalp 12/09/2019    Morbid obesity ([address] ) 05/22/2019    Left hip pain 03/07/2019    Chronic fatigue 12/20/2018    Pain of both hip joints 12/20/2018    JAYA and COPD overlap syndrome ([address] ) 10/19/2018    Dry mouth 10/19/2018    Screening for osteoporosis 08/16/2018    Colon polyps 03/22/2018    Diverticulosis 03/22/2018    Peripheral edema 03/20/2018    Type 2 diabetes mellitus without complication ([email or web address] ) 86/18/3307    Healthcare maintenance 02/01/2018    Neck pain 10/23/2017    Bilateral occipital neuralgia 09/19/2017    Breast nodule 02/15/2017    Generalized anxiety disorder 09/26/2016    Bilateral hearing loss 08/05/2016    Left medial knee pain     Primary localized osteoarthritis of left knee 03/15/2016    Restless legs syndrome 10/01/2015    Esophageal reflux 06/04/2013    Hyperlipidemia 02/04/2013    Benign essential hypertension 06/27/2012     Current Outpatient Medications   Medication Sig Dispense Refill    aspirin 81 MG tablet Take 81 mg by mouth daily        dorzolamide-timolol (COSOPT) 22 3-6 8 MG/ML ophthalmic solution INSTILL 1 DROP INTO BOTH EYES TWICE A DAY      fluorouracil (EFUDEX) 5 % cream Apply 1 application topically as needed   1    triamterene-hydrochlorothiazide (DYAZIDE) 37 5-25 mg per capsule TAKE 1 CAPSULE BY MOUTH EVERY DAY 90 capsule 1    atorvastatin (LIPITOR) 10 mg tablet TAKE 1 TABLET BY MOUTH EVERY DAY 90 tablet 1    gabapentin (NEURONTIN) 400 mg capsule Take 1 capsule (400 mg total) by mouth daily at bedtime 90 capsule 2    Klor-Con M10 10 MEQ tablet TAKE 1 TABLET (10 MEQ TOTAL) BY MOUTH 2 (TWO) TIMES A DAY FOR 90 DAYS 180 tablet 1    losartan (COZAAR) 50 mg tablet TAKE 1 TABLET BY MOUTH EVERY DAY 90 tablet 0    metFORMIN (GLUCOPHAGE-XR) 500 mg 24 hr tablet TAKE 1 TABLET BY MOUTH EVERY DAY WITH DINNER 90 tablet 1    omeprazole (PriLOSEC) 10 mg delayed release capsule TAKE 1 CAPSULE BY MOUTH EVERY DAY 90 capsule 1    omeprazole (PriLOSEC) 20 mg delayed release capsule TAKE 1 CAPSULE BY MOUTH EVERY DAY 90 capsule 0    verapamil (CALAN) 40 mg tablet TAKE 1 TABLET BY MOUTH THREE TIMES A  tablet 1     Current Facility-Administered Medications   Medication Dose Route Frequency Provider Last Rate Last Admin    lidocaine (XYLOCAINE) 1 % injection 2 mL  2 mL Injection  Donaldo Wick DPM   2 mL at 06/29/21 1059    triamcinolone acetonide (KENALOG-40) 40 mg/mL injection 40 mg  40 mg Intra-articular  Donaldo Wick DPM   40 mg at 06/29/21 1059     She is allergic to penicillins       Review of Systems   Constitutional: Negative for activity change, chills, fever and unexpected weight change  HENT: Negative for congestion  Eyes: Negative for visual disturbance  Respiratory: Negative for cough, chest tightness, shortness of breath and wheezing  Cardiovascular: Negative for chest pain  Gastrointestinal: Negative for abdominal pain, diarrhea and nausea  Endocrine: Negative for cold intolerance and heat intolerance  Musculoskeletal: Negative for arthralgias and myalgias  Skin: Negative for color change  Neurological: Negative for dizziness  Psychiatric/Behavioral: Negative for agitation, dysphoric mood and sleep disturbance  Objective:      /88 (BP Location: Left arm, Patient Position: Sitting, Cuff Size: Large)   Pulse 69   Temp (!) 97 3 °F (36 3 °C) (Temporal)   Resp 18   Ht 5' 2" (1 575 m)   Wt 91 6 kg (202 lb)   SpO2 97%   BMI 36 95 kg/m²          Physical Exam  Constitutional:       Appearance: She is well-developed  HENT:      Head: Normocephalic and atraumatic  Cardiovascular:      Rate and Rhythm: Normal rate and regular rhythm  Heart sounds: Normal heart sounds  Pulmonary:      Effort: Pulmonary effort is normal       Breath sounds: Normal breath sounds  Abdominal:      General: Bowel sounds are normal       Palpations: Abdomen is soft  Musculoskeletal:         General: Normal range of motion  Skin:     General: Skin is warm and dry  Comments: Multiple SK noted on arms, neck   Neurological:      Mental Status: She is alert and oriented to person, place, and time     Psychiatric:         Behavior: Behavior normal          Judgment: Judgment normal

## 2021-09-16 NOTE — PROGRESS NOTES
Answers for HPI/ROS submitted by the patient on 9/14/2021  How would you rate your overall health?: good  Compared to last year, how is your physical health?: same  In general, how satisfied are you with your life?: satisfied  Compared to last year, how is your eyesight?: same  Compared to last year, how is your hearing?: same  Compared to last year, how is your emotional/mental health?: same  How often is anger a problem for you?: never, rarely  How often do you feel unusually tired/fatigued?: sometimes  In the past 7 days, how much pain have you experienced?: some  If you answered "some" or "a lot", please rate the severity of your pain on a scale of 1 to 10 (1 being the least severe pain and 10 being the most intense pain)  : 2/10  In the past 6 months, have you lost or gained 10 pounds without trying?: No  One or more falls in the last year: No  In the past 6 months, have you accidentally leaked urine?: Yes  Do you have trouble with the stairs inside or outside your home?: No  Does your home have working smoke alarms?: Yes  Does your home have a carbon monoxide monitor?: No  Which safety hazards (if any) have you experienced in your home? Please select all that apply : none  How would you describe your current diet?  Please select all that apply : Regular, Limited junk food  In addition to prescription medications, are you taking any over-the-counter supplements?: Yes  If yes, what supplements are you taking?: aspirin  Can you manage your medications?: Yes  Are you currently taking any opioid medications?: No  Can you walk and transfer into and out of your bed and chair?: Yes  Can you dress and groom yourself?: Yes  Can you bathe or shower yourself?: Yes  Can you feed yourself?: Yes  Can you do your laundry/ housekeeping?: Yes  Can you manage your money, pay your bills, and track your expenses?: Yes  Can you make your own meals?: Yes  Can you do your own shopping?: Yes  Please list your [facility] (Durable Medical Equipment) supplier, if you use one : [employer]  Within the last 12 months, have you had any hospitalizations or Emergency Department visits?: No  Do you have a living will?: Yes  Do you have a Durable POA (Power of ) for healthcarAssessment/Plan:    No problem-specific Assessment & Plan notes found for this encounter  {Assess/PlanSmarDanielle:22484}      Subjective:      Patient ID: Colin Hernandez is a 77 y o  female      HPI    {Common ambulatory SmartLinks:03482}    Review of Systems      Objective:             Physical Exam  decisions?: Yes  Do you have an Advanced Directive for end of life decisions?: Yes  How often have you used an illegal drug (including marijuana) or a prescription medication for non-medical reasons in the past year?: never  What is the typical number of drinks you consume in a day?: 0  What is the typical number of drinks you consume in a week?: 0  How often did you have a drink containing alcohol in the past year?: monthly or less  How many drinks did you have on a typical day  when you were drinking in the past year?: 1 to 2  How often did you have 6 or more drinks on one occasion in the past year?: never

## 2021-10-16 ENCOUNTER — IMMUNIZATIONS (OUTPATIENT)
Dept: FAMILY MEDICINE CLINIC | Facility: HOSPITAL | Age: 76
End: 2021-10-16

## 2021-10-16 DIAGNOSIS — Z23 ENCOUNTER FOR IMMUNIZATION: Primary | ICD-10-CM

## 2021-10-16 PROCEDURE — 0001A SARS-COV-2 / COVID-19 MRNA VACCINE (PFIZER-BIONTECH) 30 MCG: CPT

## 2021-10-16 PROCEDURE — 91300 SARS-COV-2 / COVID-19 MRNA VACCINE (PFIZER-BIONTECH) 30 MCG: CPT

## 2021-10-20 DIAGNOSIS — I10 BENIGN ESSENTIAL HYPERTENSION: ICD-10-CM

## 2021-10-20 DIAGNOSIS — E11.9 TYPE 2 DIABETES MELLITUS WITHOUT COMPLICATION, WITHOUT LONG-TERM CURRENT USE OF INSULIN (HCC): ICD-10-CM

## 2021-10-20 RX ORDER — METFORMIN HYDROCHLORIDE 500 MG/1
TABLET, EXTENDED RELEASE ORAL
Qty: 90 TABLET | Refills: 1 | Status: SHIPPED | OUTPATIENT
Start: 2021-10-20 | End: 2022-04-18

## 2021-10-20 RX ORDER — POTASSIUM CHLORIDE 750 MG/1
TABLET, EXTENDED RELEASE ORAL
Qty: 180 TABLET | Refills: 1 | Status: SHIPPED | OUTPATIENT
Start: 2021-10-20 | End: 2022-04-18

## 2021-11-23 ENCOUNTER — OFFICE VISIT (OUTPATIENT)
Dept: FAMILY MEDICINE CLINIC | Facility: CLINIC | Age: 76
End: 2021-11-23

## 2021-11-23 VITALS
TEMPERATURE: 97.9 F | HEART RATE: 76 BPM | DIASTOLIC BLOOD PRESSURE: 72 MMHG | WEIGHT: 200.8 LBS | RESPIRATION RATE: 18 BRPM | BODY MASS INDEX: 36.95 KG/M2 | HEIGHT: 62 IN | SYSTOLIC BLOOD PRESSURE: 134 MMHG | OXYGEN SATURATION: 96 %

## 2021-11-23 DIAGNOSIS — R42 DIZZINESS: Primary | ICD-10-CM

## 2021-11-23 PROCEDURE — 1123F ACP DISCUSS/DSCN MKR DOCD: CPT | Performed by: FAMILY MEDICINE

## 2021-11-23 PROCEDURE — 99214 OFFICE O/P EST MOD 30 MIN: CPT | Performed by: FAMILY MEDICINE

## 2021-12-12 DIAGNOSIS — I10 HYPERTENSION, UNSPECIFIED TYPE: ICD-10-CM

## 2021-12-13 RX ORDER — LOSARTAN POTASSIUM 50 MG/1
TABLET ORAL
Qty: 90 TABLET | Refills: 0 | Status: SHIPPED | OUTPATIENT
Start: 2021-12-13 | End: 2022-03-11

## 2021-12-15 DIAGNOSIS — E78.5 HYPERLIPIDEMIA, UNSPECIFIED HYPERLIPIDEMIA TYPE: ICD-10-CM

## 2021-12-15 DIAGNOSIS — K21.9 GASTROESOPHAGEAL REFLUX DISEASE WITHOUT ESOPHAGITIS: ICD-10-CM

## 2021-12-15 DIAGNOSIS — M54.81 BILATERAL OCCIPITAL NEURALGIA: ICD-10-CM

## 2021-12-15 DIAGNOSIS — G44.85 STABBING HEADACHE: ICD-10-CM

## 2021-12-15 RX ORDER — ATORVASTATIN CALCIUM 10 MG/1
TABLET, FILM COATED ORAL
Qty: 90 TABLET | Refills: 1 | Status: SHIPPED | OUTPATIENT
Start: 2021-12-15 | End: 2022-08-05

## 2021-12-15 RX ORDER — VERAPAMIL HYDROCHLORIDE 40 MG/1
TABLET ORAL
Qty: 270 TABLET | Refills: 1 | Status: SHIPPED | OUTPATIENT
Start: 2021-12-15

## 2021-12-15 RX ORDER — OMEPRAZOLE 10 MG/1
CAPSULE, DELAYED RELEASE ORAL
Qty: 90 CAPSULE | Refills: 1 | Status: SHIPPED | OUTPATIENT
Start: 2021-12-15 | End: 2022-06-10

## 2021-12-30 DIAGNOSIS — G25.81 RESTLESS LEGS SYNDROME: ICD-10-CM

## 2021-12-30 RX ORDER — GABAPENTIN 400 MG/1
400 CAPSULE ORAL
Qty: 90 CAPSULE | Refills: 2 | Status: SHIPPED | OUTPATIENT
Start: 2021-12-30

## 2022-03-11 DIAGNOSIS — I10 HYPERTENSION, UNSPECIFIED TYPE: ICD-10-CM

## 2022-03-11 RX ORDER — LOSARTAN POTASSIUM 50 MG/1
TABLET ORAL
Qty: 90 TABLET | Refills: 0 | Status: SHIPPED | OUTPATIENT
Start: 2022-03-11 | End: 2022-06-13

## 2022-03-13 DIAGNOSIS — I10 ESSENTIAL HYPERTENSION: ICD-10-CM

## 2022-03-14 RX ORDER — TRIAMTERENE AND HYDROCHLOROTHIAZIDE 37.5; 25 MG/1; MG/1
CAPSULE ORAL
Qty: 90 CAPSULE | Refills: 1 | Status: SHIPPED | OUTPATIENT
Start: 2022-03-14

## 2022-03-24 ENCOUNTER — OFFICE VISIT (OUTPATIENT)
Dept: FAMILY MEDICINE CLINIC | Facility: CLINIC | Age: 77
End: 2022-03-24

## 2022-03-24 VITALS
BODY MASS INDEX: 36.8 KG/M2 | TEMPERATURE: 98.2 F | HEIGHT: 62 IN | SYSTOLIC BLOOD PRESSURE: 136 MMHG | RESPIRATION RATE: 18 BRPM | WEIGHT: 200 LBS | DIASTOLIC BLOOD PRESSURE: 85 MMHG | HEART RATE: 77 BPM | OXYGEN SATURATION: 96 %

## 2022-03-24 DIAGNOSIS — N89.8 VAGINAL ITCHING: Primary | ICD-10-CM

## 2022-03-24 PROCEDURE — 99214 OFFICE O/P EST MOD 30 MIN: CPT | Performed by: FAMILY MEDICINE

## 2022-03-24 NOTE — ASSESSMENT & PLAN NOTE
Lab Results   Component Value Date    HGBA1C 6 3 (H) 09/14/2021     No new lab work for today's visit  Patient is compliant with current regimen

## 2022-03-24 NOTE — ASSESSMENT & PLAN NOTE
Ongoing since last December 2021  Patient has tried topical anti-itch, fungal, and antibacterial creams with no significant relief  On PE area appears irritated, erythematous along b/l labia and skin surrounding vaginal introitus   Currently suspicious for vaginal candidiasis and intertrigo   - Script given for topical nystatin and oral fluconazole   - Advised to call office if symptoms don't change or improve

## 2022-03-24 NOTE — PROGRESS NOTES
Assessment/Plan:    Breast nodule  Acute, since early Feb 2022 on inferior crease of R breast  Patient denies pain, bleeding, or discharge  On exam lesion appears well healed with not reproducible discharge or tenderness  No palpable masses  - Advised to consult with dermatologist at next visit and monitor for any changes     Vaginal itching  Ongoing since last December 2021  Patient has tried topical anti-itch, fungal, and antibacterial creams with no significant relief  On PE area appears irritated, erythematous along b/l labia and skin surrounding vaginal introitus  Currently suspicious for vaginal candidiasis and intertrigo   - Script given for topical nystatin and oral fluconazole   - Advised to call office if symptoms don't change or improve     Type 2 diabetes mellitus without complication ([facility] )    Lab Results   Component Value Date    HGBA1C 6 3 (H) 09/14/2021     No new lab work for today's visit  Patient is compliant with current regimen  Diagnoses and all orders for this visit:    Vaginal itching          Subjective:      Patient ID: Marielle Cantor is a 69 y o  female  Patient presents today for 6 month follow-up visit  She was unable to get her labwork done due to scheduling conflicts with insurance billing when she called to make an appointment  Her concerns today include the following:    She reports having "bumps" on her groin near her vagina and on the labia  Has noticed this since the end of December and did not follow-up in the office due to recent travel  She has itchiness in the area with occasional bleeding if itching too much  Denies vaginal discharge or pain  She has tried anti-itch and antifungal with no relief  Was prescribed a topical antibiotic which she states has decreased the size of the bumps  She also has a bump on the back of her neck that has been increasing in size  It does not bother her and she denies itching, discharge, or pain   Has an appointment with dermatologist in April 2022  She has a small bump under her R breast since early Feb but denies pain or discharge  The following portions of the patient's history were reviewed and updated as appropriate: allergies, current medications, past family history, past medical history, past social history, past surgical history and problem list     Review of Systems   Constitutional: Negative for chills and fever  HENT: Negative for congestion, hearing loss and sneezing  Eyes: Negative for visual disturbance  Respiratory: Negative for cough, chest tightness and shortness of breath  Cardiovascular: Negative for chest pain  Gastrointestinal: Negative for abdominal distention, abdominal pain and anal bleeding  Genitourinary: Negative for difficulty urinating, dysuria, vaginal bleeding, vaginal discharge and vaginal pain  Wears liners for occasional urine dribbling    Musculoskeletal: Positive for myalgias  Negative for gait problem  Skin: Positive for rash (in groin)  Neurological: Positive for light-headedness (side effect of Verapamil so stopped taking her night time dose)  Negative for weakness and headaches  Psychiatric/Behavioral: Negative  Objective:      /85 (BP Location: Right arm, Patient Position: Sitting, Cuff Size: Standard)   Pulse 77   Temp 98 2 °F (36 8 °C) (Temporal)   Resp 18   Ht 5' 2" (1 575 m)   Wt 90 7 kg (200 lb)   SpO2 96%   BMI 36 58 kg/m²          Physical Exam  Constitutional:       Appearance: Normal appearance  She is obese  HENT:      Head: Normocephalic and atraumatic  Right Ear: Tympanic membrane, ear canal and external ear normal       Left Ear: Tympanic membrane, ear canal and external ear normal       Mouth/Throat:      Mouth: Mucous membranes are moist       Pharynx: Oropharynx is clear  Eyes:      Extraocular Movements: Extraocular movements intact  Cardiovascular:      Rate and Rhythm: Normal rate and regular rhythm  Pulses: Normal pulses  Heart sounds: Normal heart sounds  Pulmonary:      Effort: Pulmonary effort is normal       Breath sounds: Normal breath sounds  Abdominal:      General: Abdomen is flat  Palpations: Abdomen is soft  Genitourinary:     Vagina: No vaginal discharge  Comments: Erythematous rash without satellite lesions on labia bilaterally with erythema near vaginal introitus and skin surrounding vagina  No ulcerative lesions or lichenification  No papules of vesicles noted  Musculoskeletal:         General: Normal range of motion  Cervical back: Normal range of motion  Skin:     General: Skin is warm and dry  Comments: R breast lesions: appears well healed and crusted with no discharge or tenderness  No masses    Neurological:      General: No focal deficit present  Mental Status: She is alert and oriented to person, place, and time  Psychiatric:         Mood and Affect: Mood normal          Behavior: Behavior normal          Thought Content:  Thought content normal          Judgment: Judgment normal

## 2022-03-24 NOTE — ASSESSMENT & PLAN NOTE
Acute, since early Feb 2022 on inferior crease of R breast  Patient denies pain, bleeding, or discharge  On exam lesion appears well healed with not reproducible discharge or tenderness  No palpable masses     - Advised to consult with dermatologist at next visit and monitor for any changes Notified patient of carotid ultrasound at [address] [place], [place] [address] on 1-4-22 at 2:00 pm.  [place] at 1:30 pm.

## 2022-03-25 ENCOUNTER — TELEPHONE (OUTPATIENT)
Dept: FAMILY MEDICINE CLINIC | Facility: CLINIC | Age: 77
End: 2022-03-25

## 2022-03-25 NOTE — TELEPHONE ENCOUNTER
Patient was seen by Dr Magdiel Cunningham yesterday  She thought she was going to prescribe a cream and a pill at visit    Nothing at [facility]

## 2022-04-04 ENCOUNTER — TELEPHONE (OUTPATIENT)
Dept: FAMILY MEDICINE CLINIC | Facility: CLINIC | Age: 77
End: 2022-04-04

## 2022-04-04 NOTE — TELEPHONE ENCOUNTER
----- Message from Arlin Chinchilla sent at 4/4/2022  9:48 AM EDT -----  Regarding: Possible yeast infection  I took the medication and have been using the cream for 9 days and I see very little if any improvement  I have also used your recommendation for bladder leaks (thank you)  Should I make a follow up appointment? Not sure how long this cream takes to make any effect       Thank you  Carlota Ley

## 2022-04-06 ENCOUNTER — APPOINTMENT (OUTPATIENT)
Dept: LAB | Facility: CLINIC | Age: 77
End: 2022-04-06
Payer: MEDICARE

## 2022-04-06 DIAGNOSIS — E11.9 TYPE 2 DIABETES MELLITUS WITHOUT COMPLICATION, WITHOUT LONG-TERM CURRENT USE OF INSULIN (HCC): ICD-10-CM

## 2022-04-06 LAB
ALBUMIN SERPL BCP-MCNC: 3.8 G/DL (ref 3.5–5)
ALP SERPL-CCNC: 77 U/L (ref 46–116)
ALT SERPL W P-5'-P-CCNC: 41 U/L (ref 12–78)
ANION GAP SERPL CALCULATED.3IONS-SCNC: 11 MMOL/L (ref 4–13)
AST SERPL W P-5'-P-CCNC: 30 U/L (ref 5–45)
BASOPHILS # BLD AUTO: 0.05 THOUSANDS/ΜL (ref 0–0.1)
BASOPHILS NFR BLD AUTO: 1 % (ref 0–1)
BILIRUB SERPL-MCNC: 0.6 MG/DL (ref 0.2–1)
BUN SERPL-MCNC: 16 MG/DL (ref 5–25)
CALCIUM SERPL-MCNC: 9.5 MG/DL (ref 8.3–10.1)
CHLORIDE SERPL-SCNC: 101 MMOL/L (ref 100–108)
CHOLEST SERPL-MCNC: 125 MG/DL
CO2 SERPL-SCNC: 30 MMOL/L (ref 21–32)
CREAT SERPL-MCNC: 1.02 MG/DL (ref 0.6–1.3)
EOSINOPHIL # BLD AUTO: 0.2 THOUSAND/ΜL (ref 0–0.61)
EOSINOPHIL NFR BLD AUTO: 3 % (ref 0–6)
ERYTHROCYTE [DISTWIDTH] IN BLOOD BY AUTOMATED COUNT: 13.2 % (ref 11.6–15.1)
EST. AVERAGE GLUCOSE BLD GHB EST-MCNC: 137 MG/DL
GFR SERPL CREATININE-BSD FRML MDRD: 53 ML/MIN/1.73SQ M
GLUCOSE P FAST SERPL-MCNC: 140 MG/DL (ref 65–99)
HBA1C MFR BLD: 6.4 %
HCT VFR BLD AUTO: 43 % (ref 34.8–46.1)
HDLC SERPL-MCNC: 57 MG/DL
HGB BLD-MCNC: 13.9 G/DL (ref 11.5–15.4)
IMM GRANULOCYTES # BLD AUTO: 0.04 THOUSAND/UL (ref 0–0.2)
IMM GRANULOCYTES NFR BLD AUTO: 1 % (ref 0–2)
LDLC SERPL CALC-MCNC: 40 MG/DL (ref 0–100)
LYMPHOCYTES # BLD AUTO: 2.07 THOUSANDS/ΜL (ref 0.6–4.47)
LYMPHOCYTES NFR BLD AUTO: 30 % (ref 14–44)
MCH RBC QN AUTO: 30.8 PG (ref 26.8–34.3)
MCHC RBC AUTO-ENTMCNC: 32.3 G/DL (ref 31.4–37.4)
MCV RBC AUTO: 95 FL (ref 82–98)
MONOCYTES # BLD AUTO: 0.6 THOUSAND/ΜL (ref 0.17–1.22)
MONOCYTES NFR BLD AUTO: 9 % (ref 4–12)
NEUTROPHILS # BLD AUTO: 4.02 THOUSANDS/ΜL (ref 1.85–7.62)
NEUTS SEG NFR BLD AUTO: 56 % (ref 43–75)
NONHDLC SERPL-MCNC: 68 MG/DL
NRBC BLD AUTO-RTO: 0 /100 WBCS
PLATELET # BLD AUTO: 345 THOUSANDS/UL (ref 149–390)
PMV BLD AUTO: 10.1 FL (ref 8.9–12.7)
POTASSIUM SERPL-SCNC: 3.7 MMOL/L (ref 3.5–5.3)
PROT SERPL-MCNC: 7.7 G/DL (ref 6.4–8.2)
RBC # BLD AUTO: 4.51 MILLION/UL (ref 3.81–5.12)
SODIUM SERPL-SCNC: 142 MMOL/L (ref 136–145)
TRIGL SERPL-MCNC: 142 MG/DL
WBC # BLD AUTO: 6.98 THOUSAND/UL (ref 4.31–10.16)

## 2022-04-06 PROCEDURE — 36415 COLL VENOUS BLD VENIPUNCTURE: CPT

## 2022-04-06 PROCEDURE — 80053 COMPREHEN METABOLIC PANEL: CPT

## 2022-04-06 PROCEDURE — 83036 HEMOGLOBIN GLYCOSYLATED A1C: CPT

## 2022-04-06 PROCEDURE — 80061 LIPID PANEL: CPT

## 2022-04-06 PROCEDURE — 85025 COMPLETE CBC W/AUTO DIFF WBC: CPT

## 2022-04-07 ENCOUNTER — OFFICE VISIT (OUTPATIENT)
Dept: FAMILY MEDICINE CLINIC | Facility: CLINIC | Age: 77
End: 2022-04-07

## 2022-04-07 VITALS
RESPIRATION RATE: 16 BRPM | SYSTOLIC BLOOD PRESSURE: 142 MMHG | WEIGHT: 198.8 LBS | TEMPERATURE: 98.7 F | DIASTOLIC BLOOD PRESSURE: 80 MMHG | OXYGEN SATURATION: 98 % | HEIGHT: 62 IN | BODY MASS INDEX: 36.58 KG/M2 | HEART RATE: 66 BPM

## 2022-04-07 DIAGNOSIS — E11.9 TYPE 2 DIABETES MELLITUS WITHOUT COMPLICATION, WITHOUT LONG-TERM CURRENT USE OF INSULIN (HCC): ICD-10-CM

## 2022-04-07 DIAGNOSIS — J44.9 OSA AND COPD OVERLAP SYNDROME (HCC): ICD-10-CM

## 2022-04-07 DIAGNOSIS — E66.01 MORBID OBESITY (HCC): ICD-10-CM

## 2022-04-07 DIAGNOSIS — G47.33 OSA AND COPD OVERLAP SYNDROME (HCC): ICD-10-CM

## 2022-04-07 DIAGNOSIS — N18.31 STAGE 3A CHRONIC KIDNEY DISEASE (HCC): ICD-10-CM

## 2022-04-07 DIAGNOSIS — N89.8 VAGINAL ITCHING: Primary | ICD-10-CM

## 2022-04-07 PROCEDURE — 99213 OFFICE O/P EST LOW 20 MIN: CPT | Performed by: FAMILY MEDICINE

## 2022-04-07 RX ORDER — FLUCONAZOLE 150 MG/1
150 TABLET ORAL ONCE
COMMUNITY
Start: 2022-03-25 | End: 2022-05-09

## 2022-04-07 NOTE — PROGRESS NOTES
Assessment/Plan:    Vaginal itching  Concern for lichen sclerosis and may need biopsy  Referred to gyn for further eval        a      Subjective:      Patient ID: Li Feliciano is a 69 y o  female  Had vulvar itching  Was examined and treated as superficial candidiasis  DDx also included lichen planus  Was treated with oral and topical antifungal    Now  - still has itching and she feels the skin is thinner and bleeds some  The following portions of the patient's history were reviewed and updated as appropriate:   She  has a past medical history of Abnormal mammogram of right breast (06/29/2017), Acute medial meniscal tear, left, initial encounter (06/29/2017), Arthritis, Generalized anxiety disorder (08/31/2017), GERD (gastroesophageal reflux disease), Herpes zoster (09/26/2016), Hyperlipidemia, Hypertension, Hypokalemia, Left medial knee pain, Localized osteoarthritis of left knee (03/15/2016), Post-traumatic headache (09/19/2017), and Restless leg syndrome    She   Patient Active Problem List    Diagnosis Date Noted    Stage 3a chronic kidney disease ([facility] ) 04/07/2022    Vaginal itching 03/24/2022    Dizziness 11/23/2021    Diarrhea 07/18/2020    Obstructive sleep apnea 04/17/2020    Chronic migraine without aura without status migrainosus, not intractable 04/17/2020    Skin lesion of scalp 12/09/2019    Morbid obesity ([address] ) 05/22/2019    Left hip pain 03/07/2019    Chronic fatigue 12/20/2018    Pain of both hip joints 12/20/2018    JAYA and COPD overlap syndrome ([address] ) 10/19/2018    Dry mouth 10/19/2018    Screening for osteoporosis 08/16/2018    Colon polyps 03/22/2018    Diverticulosis 03/22/2018    Peripheral edema 03/20/2018    Type 2 diabetes mellitus without complication ([address] ) 94/12/7282    Healthcare maintenance 02/01/2018    Neck pain 10/23/2017    Bilateral occipital neuralgia 09/19/2017    Breast nodule 02/15/2017    Generalized anxiety disorder 09/26/2016    Bilateral hearing loss 08/05/2016    Left medial knee pain     Primary localized osteoarthritis of left knee 03/15/2016    Restless legs syndrome 10/01/2015    Esophageal reflux 06/04/2013    Hyperlipidemia 02/04/2013    Benign essential hypertension 06/27/2012     Current Outpatient Medications   Medication Sig Dispense Refill    aspirin 81 MG tablet Take 81 mg by mouth daily   atorvastatin (LIPITOR) 10 mg tablet TAKE 1 TABLET BY MOUTH EVERY DAY 90 tablet 1    dorzolamide-timolol (COSOPT) 22 3-6 8 MG/ML ophthalmic solution INSTILL 1 DROP INTO BOTH EYES TWICE A DAY      fluconazole (DIFLUCAN) 150 mg tablet Take 150 mg by mouth once      fluorouracil (EFUDEX) 5 % cream Apply 1 application topically as needed   1    gabapentin (NEURONTIN) 400 mg capsule Take 1 capsule (400 mg total) by mouth daily at bedtime 90 capsule 2    Klor-Con M10 10 MEQ tablet TAKE 1 TABLET (10 MEQ TOTAL) BY MOUTH 2 (TWO) TIMES A DAY FOR 90 DAYS 180 tablet 1    losartan (COZAAR) 50 mg tablet TAKE 1 TABLET BY MOUTH EVERY DAY 90 tablet 0    metFORMIN (GLUCOPHAGE-XR) 500 mg 24 hr tablet TAKE 1 TABLET BY MOUTH EVERY DAY WITH DINNER 90 tablet 1    nystatin (MYCOSTATIN) cream Apply topically 2 (two) times a day 30 g 0    omeprazole (PriLOSEC) 10 mg delayed release capsule TAKE 1 CAPSULE BY MOUTH EVERY DAY 90 capsule 1    triamterene-hydrochlorothiazide (DYAZIDE) 37 5-25 mg per capsule TAKE 1 CAPSULE BY MOUTH EVERY DAY 90 capsule 1    verapamil (CALAN) 40 mg tablet TAKE 1 TABLET BY MOUTH THREE TIMES A  tablet 1     Current Facility-Administered Medications   Medication Dose Route Frequency Provider Last Rate Last Admin    lidocaine (XYLOCAINE) 1 % injection 2 mL  2 mL Injection  Danetta Lianet, DPM   2 mL at 06/29/21 1059    triamcinolone acetonide (KENALOG-40) 40 mg/mL injection 40 mg  40 mg Intra-articular  Danetta Lianet, DPM   40 mg at 06/29/21 1059     She is allergic to penicillins       Review of Systems   Constitutional: Negative for chills and fever  HENT: Negative for ear pain and sore throat  Eyes: Negative for pain and visual disturbance  Respiratory: Negative for cough and shortness of breath  Cardiovascular: Negative for chest pain and palpitations  Gastrointestinal: Negative for abdominal pain and vomiting  Genitourinary: Negative for dysuria and hematuria  Musculoskeletal: Negative for arthralgias and back pain  Skin: Negative for color change and rash  Neurological: Negative for seizures and syncope  All other systems reviewed and are negative  Objective:      /80 (BP Location: Right arm, Patient Position: Sitting, Cuff Size: Large)   Pulse 66   Temp 98 7 °F (37 1 °C) (Temporal)   Resp 16   Ht 5' 2" (1 575 m)   Wt 90 2 kg (198 lb 12 8 oz)   SpO2 98%   BMI 36 36 kg/m²          Physical Exam  Constitutional:       Appearance: She is well-developed  HENT:      Head: Normocephalic and atraumatic  Cardiovascular:      Rate and Rhythm: Normal rate and regular rhythm  Heart sounds: Normal heart sounds  Pulmonary:      Effort: Pulmonary effort is normal       Breath sounds: Normal breath sounds  Abdominal:      General: Bowel sounds are normal       Palpations: Abdomen is soft  Musculoskeletal:         General: Normal range of motion  Skin:     General: Skin is warm and dry  Neurological:      Mental Status: She is alert and oriented to person, place, and time     Psychiatric:         Behavior: Behavior normal          Judgment: Judgment normal

## 2022-04-14 ENCOUNTER — OFFICE VISIT (OUTPATIENT)
Dept: OBGYN CLINIC | Facility: CLINIC | Age: 77
End: 2022-04-14
Payer: MEDICARE

## 2022-04-14 VITALS
DIASTOLIC BLOOD PRESSURE: 80 MMHG | WEIGHT: 200.6 LBS | HEIGHT: 62 IN | SYSTOLIC BLOOD PRESSURE: 136 MMHG | BODY MASS INDEX: 36.91 KG/M2

## 2022-04-14 DIAGNOSIS — N89.8 VAGINAL ITCHING: ICD-10-CM

## 2022-04-14 DIAGNOSIS — N90.89 VULVAR IRRITATION: Primary | ICD-10-CM

## 2022-04-14 PROCEDURE — 1123F ACP DISCUSS/DSCN MKR DOCD: CPT | Performed by: OBSTETRICS & GYNECOLOGY

## 2022-04-14 PROCEDURE — 99213 OFFICE O/P EST LOW 20 MIN: CPT | Performed by: OBSTETRICS & GYNECOLOGY

## 2022-04-14 RX ORDER — NYSTATIN 100000 U/G
OINTMENT TOPICAL 2 TIMES DAILY
Qty: 30 G | Refills: 1 | Status: SHIPPED | OUTPATIENT
Start: 2022-04-14 | End: 2022-05-09

## 2022-04-14 RX ORDER — TRIAMCINOLONE ACETONIDE 5 MG/G
OINTMENT TOPICAL 2 TIMES DAILY
Qty: 30 G | Refills: 1 | Status: SHIPPED | OUTPATIENT
Start: 2022-04-14

## 2022-04-14 NOTE — PROGRESS NOTES
Assessment/Plan:    Suspect underlying lichen - will treat BID x 2 weeks with combined triamcinolone and nystatin ointment  Aware to combined small amount of both ointment and then place  Will follow up in office in May - pending exam at that time likely for biopsy if symptoms not fully resolved  Patient in agreement with plan  Follow up scheduled  Vulvar irritation  Vaginal itching  -     Ambulatory Referral to Gynecology  -     triamcinolone (KENALOG) 0 5 % ointment; Apply topically 2 (two) times a day  -     nystatin (MYCOSTATIN) ointment; Apply topically 2 (two) times a day        Subjective:      Patient ID: Jean-Claude Damon is a 69 y o  female  Bill[place] Don presents today as a new patient referred by her primary care provider to discuss her ongoing vulvar itching  She reports this has been ongoing for the last 3 months  She notes a few small bumps on her labia majora  She can sometimes see some mild bleeding with scratching the area  She saw her primary care provider - and was given treatment for yeast (PO and topical)  Her symptoms continued and she was referred here for consideration lichen, and possible biopsy      The following portions of the patient's history were reviewed and updated as appropriate: allergies, current medications and problem list     Patient Active Problem List   Diagnosis    Left medial knee pain    Benign essential hypertension    Bilateral hearing loss    Bilateral occipital neuralgia    Breast nodule    Neck pain    Esophageal reflux    Generalized anxiety disorder    Hyperlipidemia    Primary localized osteoarthritis of left knee    Restless legs syndrome    Type 2 diabetes mellitus without complication ([address] )    Healthcare maintenance    Peripheral edema    Colon polyps    Diverticulosis    Screening for osteoporosis    JAYA and COPD overlap syndrome (HCC)    Dry mouth    Chronic fatigue    Pain of both hip joints    Left hip pain    Morbid obesity ([address] )    Skin lesion of scalp    Obstructive sleep apnea    Chronic migraine without aura without status migrainosus, not intractable    Diarrhea    Dizziness    Vaginal itching    Stage 3a chronic kidney disease (HCC)       Current Outpatient Medications:     aspirin 81 MG tablet, Take 81 mg by mouth daily  , Disp: , Rfl:     atorvastatin (LIPITOR) 10 mg tablet, TAKE 1 TABLET BY MOUTH EVERY DAY, Disp: 90 tablet, Rfl: 1    dorzolamide-timolol (COSOPT) 22 3-6 8 MG/ML ophthalmic solution, INSTILL 1 DROP INTO BOTH EYES TWICE A DAY, Disp: , Rfl:     fluconazole (DIFLUCAN) 150 mg tablet, Take 150 mg by mouth once, Disp: , Rfl:     fluorouracil (EFUDEX) 5 % cream, Apply 1 application topically as needed , Disp: , Rfl: 1    gabapentin (NEURONTIN) 400 mg capsule, Take 1 capsule (400 mg total) by mouth daily at bedtime, Disp: 90 capsule, Rfl: 2    Klor-Con M10 10 MEQ tablet, TAKE 1 TABLET (10 MEQ TOTAL) BY MOUTH 2 (TWO) TIMES A DAY FOR 90 DAYS, Disp: 180 tablet, Rfl: 1    losartan (COZAAR) 50 mg tablet, TAKE 1 TABLET BY MOUTH EVERY DAY, Disp: 90 tablet, Rfl: 0    metFORMIN (GLUCOPHAGE-XR) 500 mg 24 hr tablet, TAKE 1 TABLET BY MOUTH EVERY DAY WITH DINNER, Disp: 90 tablet, Rfl: 1    nystatin (MYCOSTATIN) cream, Apply topically 2 (two) times a day, Disp: 30 g, Rfl: 0    omeprazole (PriLOSEC) 10 mg delayed release capsule, TAKE 1 CAPSULE BY MOUTH EVERY DAY, Disp: 90 capsule, Rfl: 1    triamterene-hydrochlorothiazide (DYAZIDE) 37 5-25 mg per capsule, TAKE 1 CAPSULE BY MOUTH EVERY DAY, Disp: 90 capsule, Rfl: 1    verapamil (CALAN) 40 mg tablet, TAKE 1 TABLET BY MOUTH THREE TIMES A DAY, Disp: 270 tablet, Rfl: 1    nystatin (MYCOSTATIN) ointment, Apply topically 2 (two) times a day, Disp: 30 g, Rfl: 1    triamcinolone (KENALOG) 0 5 % ointment, Apply topically 2 (two) times a day, Disp: 30 g, Rfl: 1    Current Facility-Administered Medications:     lidocaine (XYLOCAINE) 1 % injection 2 mL, 2 mL, Injection, , Ninetta Marmaduke, DPM, 2 mL at 06/29/21 1059    triamcinolone acetonide (KENALOG-40) 40 mg/mL injection 40 mg, 40 mg, Intra-articular, , Ninetta Marmaduke, DPM, 40 mg at 06/29/21 1059     Allergies   Allergen Reactions    Penicillins Rash     itching       Review of Systems      Objective:      /80 (BP Location: Left arm, Patient Position: Sitting, Cuff Size: Large)   Ht 5' 2" (1 575 m)   Wt 91 kg (200 lb 9 6 oz)   BMI 36 69 kg/m²          Physical Exam  Vitals and nursing note reviewed  Constitutional:       Appearance: Normal appearance  Genitourinary:      Neurological:      Mental Status: She is alert

## 2022-04-14 NOTE — PATIENT INSTRUCTIONS
Lichen Sclerosus   AMBULATORY CARE:   Lichen sclerosus  is a skin condition that most often affects the vulva, penis, or skin around the anus  Lichen sclerosus occurs most often in females  The cause of lichen sclerosus may not be known  Common symptoms include the following: You may not have any symptoms, or you may have any of the following:  · Pain, itching, or burning    · Areas of skin that are thin, wrinkled, and white    · Blisters    · Bleeding, bruises, or tears on affected skin    Contact your healthcare provider if:   · Your symptoms do not get better with treatment  · You have questions or concerns about your condition or care  Treatment and management:  You do not need treatment if you do not have any symptoms  Your healthcare provider may recommend a steroid cream or ointment  Your provider may also recommend a topical medicine that prevents your immune system from attacking your skin  If you are female, your healthcare provider may recommend that you do not take bubble baths, or use scented soaps and scented detergents  This may help decrease irritation of the vulva  Rarely, adults may need surgery to repair scarring that can occur over time  Follow up with your doctor as directed:  Write down your questions so you remember to ask them during your visits  © Copyright [employer] 2022 Information is for End User's use only and may not be sold, redistributed or otherwise used for commercial purposes  All illustrations and images included in CareNotes® are the copyrighted property of [employer]  or Radha Bradshaw  The above information is an  only  It is not intended as medical advice for individual conditions or treatments  Talk to your doctor, nurse or pharmacist before following any medical regimen to see if it is safe and effective for you

## 2022-04-16 DIAGNOSIS — I10 BENIGN ESSENTIAL HYPERTENSION: ICD-10-CM

## 2022-04-16 DIAGNOSIS — E11.9 TYPE 2 DIABETES MELLITUS WITHOUT COMPLICATION, WITHOUT LONG-TERM CURRENT USE OF INSULIN (HCC): ICD-10-CM

## 2022-04-18 RX ORDER — POTASSIUM CHLORIDE 750 MG/1
TABLET, EXTENDED RELEASE ORAL
Qty: 180 TABLET | Refills: 1 | Status: SHIPPED | OUTPATIENT
Start: 2022-04-18

## 2022-04-18 RX ORDER — METFORMIN HYDROCHLORIDE 500 MG/1
TABLET, EXTENDED RELEASE ORAL
Qty: 90 TABLET | Refills: 1 | Status: SHIPPED | OUTPATIENT
Start: 2022-04-18

## 2022-04-25 ENCOUNTER — TELEPHONE (OUTPATIENT)
Dept: DERMATOLOGY | Facility: CLINIC | Age: 77
End: 2022-04-25

## 2022-04-25 NOTE — TELEPHONE ENCOUNTER
Forms from [facility] of the [address] [place] [address]-to schedule a procedure long        Called and left pt a v/m to schedule a procedure long appt with Dr Ramy Matute in one of his Wednesday spots at [facility] did confirmed with [facility] that this is how this appt should be scheduled  (I was told to offer a spot for this Wednesday 4/27/22) I didn't give an appt option when leaving the v/m  Please offer next available appt time

## 2022-05-09 ENCOUNTER — OFFICE VISIT (OUTPATIENT)
Dept: OBGYN CLINIC | Facility: CLINIC | Age: 77
End: 2022-05-09

## 2022-05-09 VITALS
DIASTOLIC BLOOD PRESSURE: 76 MMHG | HEIGHT: 62 IN | SYSTOLIC BLOOD PRESSURE: 158 MMHG | WEIGHT: 202.4 LBS | BODY MASS INDEX: 37.25 KG/M2

## 2022-05-09 DIAGNOSIS — N90.89 VULVAR IRRITATION: Primary | ICD-10-CM

## 2022-05-09 PROCEDURE — 99212 OFFICE O/P EST SF 10 MIN: CPT | Performed by: OBSTETRICS & GYNECOLOGY

## 2022-05-10 NOTE — PROGRESS NOTES
Assessment/Plan:      Diagnoses and all orders for this visit:    Vulvar irritation      Should continue to use triamcinalone nightly x 4 weeks with follow up in 6-8 weeks for recheck due to suspected lichen sclerosis  Subjective:     Patient ID: Moshe Gil is a 69 y o  female  Faheaven Connellyters presents today for a follow up visit  She used the nystatin and triamcinolone twice daily for about 2-2 5wks and then tapered off  She had almost instant improvement in her symptoms and feels well today  She has no concerns other than should she continue to use her medication         Review of Systems      Objective:     Physical Exam  Genitourinary:

## 2022-05-11 ENCOUNTER — PROCEDURE VISIT (OUTPATIENT)
Dept: DERMATOLOGY | Facility: CLINIC | Age: 77
End: 2022-05-11
Payer: MEDICARE

## 2022-05-11 DIAGNOSIS — L72.0 EIC (EPIDERMAL INCLUSION CYST): Primary | ICD-10-CM

## 2022-05-11 PROCEDURE — 12042 INTMD RPR N-HF/GENIT2.6-7.5: CPT | Performed by: DERMATOLOGY

## 2022-05-11 PROCEDURE — 88304 TISSUE EXAM BY PATHOLOGIST: CPT | Performed by: PATHOLOGY

## 2022-05-11 PROCEDURE — 11424 EXC H-F-NK-SP B9+MARG 3.1-4: CPT | Performed by: DERMATOLOGY

## 2022-05-11 NOTE — PROGRESS NOTES
PROCEDURE:  EXCISION WITH INTERMEDIATE LAYERED CLOSURE     Attending: Kamala Kuo  Assistant: David Marcelo    Pre-Op Diagnosis: Epidermal inclusion cyst  Post-Op Diagnosis: Same   Benign versus Malignant Benign      Lesion Anatomic Location: Left posterior neck  Pre-op size: 2 0 cm x 3 5 cm  Size of defect:  2 0 cm x 3 5 cm  Final repaired wound length:  3 5 cm    Written and verbal, witnessed informed consent was obtained  I discussed that excision is a method of removing lesions both benign and malignant lesions  A portion of normal skin is often taken to ensure completeness of removal   I reviewed that procedure will include numbing the area,  cutting around and under defect, undermining tissue, and closing the wound with sutures both inside and out  These sutures are usually removed in 7 to 14 days  Risks (bleeding, pain, infection, scarring, recurrence) and benefits discussed  It was discussed with patient that every effort is made to minimize scar, but scarring is influenced also by extrinsic factor such as location, age and genetics  Time Out: performed:  yes  Correct patient: yes  Correct site per Clinic Report: yes  Correct site per Patient Report: yes    LOCAL ANESTHESIA: 1% xylocaine with epi     DESCRIPTION OF PROCEDURE: The patient was brought back into the procedure room, anesthetized locally, prepped and draped in the usual fashion  Using a #15 blade with a scalpel, the lesion was excised in elliptical fashion  The wound was  undermined in the  fascial plane  Hemostasis was achieved with light electrocoagulation  Purpose of undermining was to decrease wound tension and facilitate closure  The wound was closed with subcutaneous sutures as follows:    Deep suture:4-0 Vicryl      Epidermal edge closure was accomplished with superficial sutures as follows:    Superficial suture: 4-0 Prolene  Superficial suture type: running    Estimated blood loss less than 3ml      The patient tolerated the procedure well without any complications  The wound was cleaned with sterile saline, dried off, surgical vaseline ointment was applied, and the wound was covered  A pressure dressing was applied for stabilization and light pressure  The patient was given detailed oral and written instructions on postoperative care as detailed in consent  The patient left in good medical condition  POSTOP DISCUSSION DISCUSSION AND INSTRUCTIONS FOR PATIENT      Rationale for Procedure  A skin excision allows the dermatologist to remove a lesion  The procedure involves a local numbing medication and removing the entire lesion  Typically, the lesion is being removed because it is atypical, traumatized, or for significant appearance reasons  The area will be open like a brush burn and allowed to heal    There will be no sutures  Tissue is sent to pathologist who will reconfirm diagnosis and verify completeness of lesion removal     Description of Procedure  We would like to perform a skin excision today  A local anesthetic, similar to the kind that a dentist uses when filling a cavity, will be injected with a very small needle into the skin area to be sampled  The injected skin and tissue underneath should go to sleep and become numbed so that no further pain should be felt  A scalpel will be used to cut around the lesion and tissue will be submitted to pathology for examination  Depending on the diagnosis the lesion will be excised with a certain amount of normal skin to help assure completeness of lesion removal   The physician will discuss in advance the anticipated size and extent of removal    Bleeding will occur, but it will stopped with direct pressure, sutures, and electrocautery  Surgical Vaseline-type ointment will also applied after the procedure to help create a barrier between the wound and the outside world        Risks and Potential Complications  The advantage of a skin excision is that it allows us to remove a problem lesion quickly  Although this usually permits the lesion to heal as soon as possible with the least scarring, there are some risks and potential complications that include but are not limited to the following:  - Some bleeding is normal at time of procedure and some bleeding on gauze is normal  the first few days after surgery  Profuse bleeding and bleeding with swelling and pain should be reported as detailed  below  - Infection is uncommon in skin surgery  Infection should be reported and is indicated by pain, redness, and discharge of purulent material   - Some dull to at time sharp pain could occur initially the day after surgery  Persistent pain or increasing pain days after surgery is not expected and should be reported  - Every effort is made to minimize scar, but location, size, and genetics do play a role in scar appearance  A surgical wound does not achieve its optimal appearance until 6 months  There are several treatments available if scarring would be problematic including scar creams, silicone pad, laser and scar revision   - Skin discoloration can occur especially in people of color  Its important to avoid sun on wound in first 6 months after surgery  Treatment is available if pigment is problematic   - Incomplete removal of the lesion or recurrence of lesion can occur and this would then require further treatment and more surgery   - Nerve Damage/Numbness/Loss of Function is very rare, but is most likely to occur if lesion is large or if it is in a high risk location  - Allergic Reaction to lidocaine is rare  More commonly,  epinephrine is used  with the lidocaine  Occasionally, epinephrine (aka adrenalin) may cause a brief  feeling of anxiety or jitteriness  - The person at the microscope  (pathologist) may provide additional information that was unexpected  This unexpected finding could provoke the need for additional treatment or evaluation      What You Will Need to Do After the Procedure  1  Keep the area clean and dry the first day  Try NOT to remove the bandage for the first day  2  Gently clean the area with soap and water and apply Vaseline ointment (this is over the counter and not a prescription) to the excision  site for up to 2 weeks  3  Apply a clean appropriately sized bandage to area  Gauze and paper tape are recommended for sensitive skin  4  Return for suture removal as instructed (generally 1 week for the face, 2 weeks for the body)  5  Take Acetaminophen (Tylenol) for discomfort, if no contraindications  Do NOT take Ibuprofen or aspirin unless specifically told to do so by your Dermatologist because these medications can make bleeding worse  6  Call our office immediately for signs of infection: fever, chills, increased redness, warmth, tenderness, discomfort/pain, or pus or foul smell coming from the wound  If bleeding is noticed, place a clean cloth over the area and apply firm pressure for thirty minutes  Check the wound ONLY after 30 minutes of direct pressure; do not cheat and sneak a peak, as that does not count  If bleeding persists after 30 minutes of legitimate direct pressure, then try one more round of direct pressure for an additional 10 minutes to the area  Should the bleeding become heavier or not stop after the second attempt, call [facility] directly at [phone] (SKIN) or, if after hours, go to your local Emergency Room/Emergency Department  3 5cm cyst excised with 3 5cm closure  Well tolerated  S/R in 2 weeks      Scribe Attestation    I,:  Emerald Miranda am acting as a scribe while in the presence of the attending physician :       I,:  Pierre Drummond MD personally performed the services described in this documentation    as scribed in my presence :

## 2022-05-11 NOTE — PATIENT INSTRUCTIONS
EXCISIONAL BIOPSY    Rationale for Procedure  A skin excisional biopsy allows the dermatologist to further examine a lesion or rash under the microscope to potentially obtain a more specific diagnosis  It usually involves numbing the area with numbing medication and removing a large piece of skin  Usually, the area will be closed with sutures at the end of the procedure  Sutures usually need to be removed in 5 to 7 days of the biopsy was performed on the face or in 10 to 14 days if performed elsewhere on the body  Description of Procedure  We would like to perform a skin excisional biopsy today  A local anesthetic, similar to the kind that a dentist uses when filling a cavity, will be injected with a very small needle into the skin area to be sampled  The injected skin and tissue underneath should go to sleep and become numbed so that no further pain should be felt  An instrument shaped like a tiny "razor blade" (i e , the scalpel instrument) will be used to cut a large round piece of skin and tissue from the area so that the sample may be taken and examined more closely under the microscope  A slight amount of bleeding will occur, but it is usually stopped with direct pressure, chemical cautery, and/or a pressure bandage; electrocautery and other means of intervention may be necessary to help stop the bleeding  Sutures/stitches are usually applied to help aid in wound healing  Surgical Vaseline-type ointment will also applied after the procedure to help create a barrier between the wound and the outside world      Risks and Potential Complications  While the advantage of a skin punch biopsy is that it allows us to potentially examine the skin more closely under the microscope, there are some risks and potential complications that include but are not limited to the following:  - Bleeding  - Infection  - Pain  - Scar/keloid  - Skin discoloration  - Incomplete Removal  - Recurrence   - Wound dehiscence  - Distortion/alteration of surrounding anatomic features  - Nerve Damage/Numbness, which may be permanent  - Allergic Reaction to Anesthesia or Antibiotic     Postoperative Care:  1  Keep the area dry for 24 hours  After 24 hours, you may remove the bandage and get the site minimally wet as you wash/shower  Re-bandage of the surgical site is optional, unless directed by your medical provider  2  Apply Vaseline ointment (or a prescribed topical antibiotic ointment) daily to the excision site for up to 2 weeks  3    If you were prescribed an antibiotic by mouth, please take it as directed, if no contraindications  4  Return for suture removal as instructed (generally 1 week for the face, 2 weeks for the body)  5    Call our office immediately for signs of infection: fever, chills, increased redness, warmth, tenderness, discomfort/pain, or yellow or foul smelling drainage

## 2022-05-24 ENCOUNTER — OFFICE VISIT (OUTPATIENT)
Dept: DERMATOLOGY | Facility: CLINIC | Age: 77
End: 2022-05-24

## 2022-05-24 DIAGNOSIS — L72.0 EIC (EPIDERMAL INCLUSION CYST): Primary | ICD-10-CM

## 2022-05-24 PROCEDURE — RECHECK: Performed by: DERMATOLOGY

## 2022-05-24 NOTE — PROGRESS NOTES
Suture removal    Date/Time: 5/24/2022 9:41 AM  Performed by: Renetta Burciaga  Authorized by: Rosaura Urban MD   Universal Protocol:  Risks and benefits: risks, benefits and alternatives were discussed  Consent given by: patient  Patient identity confirmed: verbally with patient        Location:     Location:  [address] [facility] location:  Neck  Procedure details: Tools used:  Suture removal kit    Wound appearance:  Pink    Number of sutures removed:  1 (running)  Post-procedure details:     Post-removal:  No dressing applied    Patient tolerance of procedure:   Tolerated well, no immediate complications  Comments:      Advised patient pathology results are still pending and Dr Saleh will call her as soon as there final        Scribe Attestation    I,:  Renetta Burciaga am acting as a scribe while in the presence of the attending physician :       I,:  Rosaura Urban MD personally performed the services described in this documentation    as scribed in my presence :

## 2022-05-26 ENCOUNTER — TELEPHONE (OUTPATIENT)
Dept: FAMILY MEDICINE CLINIC | Facility: CLINIC | Age: 77
End: 2022-05-26

## 2022-05-26 NOTE — TELEPHONE ENCOUNTER
Folder (To be completed by) Dione Reilly team Dr Yudith Dent     Name of [address] for audiology services    Form to be Faxed [phone]

## 2022-06-10 DIAGNOSIS — K21.9 GASTROESOPHAGEAL REFLUX DISEASE WITHOUT ESOPHAGITIS: ICD-10-CM

## 2022-06-10 RX ORDER — OMEPRAZOLE 10 MG/1
CAPSULE, DELAYED RELEASE ORAL
Qty: 90 CAPSULE | Refills: 1 | Status: SHIPPED | OUTPATIENT
Start: 2022-06-10

## 2022-06-11 DIAGNOSIS — I10 HYPERTENSION, UNSPECIFIED TYPE: ICD-10-CM

## 2022-06-13 RX ORDER — LOSARTAN POTASSIUM 50 MG/1
TABLET ORAL
Qty: 90 TABLET | Refills: 0 | Status: SHIPPED | OUTPATIENT
Start: 2022-06-13

## 2022-07-07 ENCOUNTER — OFFICE VISIT (OUTPATIENT)
Dept: OBGYN CLINIC | Facility: CLINIC | Age: 77
End: 2022-07-07
Payer: MEDICARE

## 2022-07-07 VITALS
WEIGHT: 201.6 LBS | DIASTOLIC BLOOD PRESSURE: 74 MMHG | BODY MASS INDEX: 37.1 KG/M2 | SYSTOLIC BLOOD PRESSURE: 126 MMHG | HEIGHT: 62 IN

## 2022-07-07 DIAGNOSIS — N90.89 VULVAR IRRITATION: Primary | ICD-10-CM

## 2022-07-07 DIAGNOSIS — N89.8 VAGINAL ITCHING: ICD-10-CM

## 2022-07-07 PROCEDURE — 99212 OFFICE O/P EST SF 10 MIN: CPT | Performed by: OBSTETRICS & GYNECOLOGY

## 2022-07-11 NOTE — PROGRESS NOTES
Assessment/Plan:     Reassurance provided based on exam today  No concerning lesions that require biopsy at this time  Can use creams PRN  - to call if symptoms do not improve/resolve with use  Follow up in 6 months for re-check  She is is in agreement with the plan  She will call if she needs refills  Diagnoses and all orders for this visit:    Vulvar irritation    Vaginal itching          Subjective:     Patient ID: Colin Schroeder is a 69 y o  female  [facility] presents for follow up of chronic vulvar conditions  She overall feels well  Did have some irritation/rash in her groin creases for which she used her cream - but this has improved         Review of Systems      Objective:     Physical Exam  Genitourinary:

## 2022-08-04 DIAGNOSIS — E78.5 HYPERLIPIDEMIA, UNSPECIFIED HYPERLIPIDEMIA TYPE: ICD-10-CM

## 2022-08-05 RX ORDER — ATORVASTATIN CALCIUM 10 MG/1
TABLET, FILM COATED ORAL
Qty: 90 TABLET | Refills: 1 | Status: SHIPPED | OUTPATIENT
Start: 2022-08-05

## 2022-08-12 ENCOUNTER — HOSPITAL ENCOUNTER (OUTPATIENT)
Dept: RADIOLOGY | Age: 77
Discharge: HOME | End: 2022-08-12
Payer: MEDICARE

## 2022-08-12 VITALS — HEIGHT: 62 IN | BODY MASS INDEX: 36.99 KG/M2 | WEIGHT: 201 LBS

## 2022-08-12 DIAGNOSIS — Z12.31 ENCOUNTER FOR SCREENING MAMMOGRAM FOR MALIGNANT NEOPLASM OF BREAST: ICD-10-CM

## 2022-08-12 PROCEDURE — 77067 SCR MAMMO BI INCL CAD: CPT

## 2022-08-12 PROCEDURE — 77063 BREAST TOMOSYNTHESIS BI: CPT

## 2022-09-09 DIAGNOSIS — I10 ESSENTIAL HYPERTENSION: ICD-10-CM

## 2022-09-10 DIAGNOSIS — I10 HYPERTENSION, UNSPECIFIED TYPE: ICD-10-CM

## 2022-09-13 RX ORDER — TRIAMTERENE AND HYDROCHLOROTHIAZIDE 37.5; 25 MG/1; MG/1
CAPSULE ORAL
Qty: 90 CAPSULE | Refills: 1 | Status: SHIPPED | OUTPATIENT
Start: 2022-09-13

## 2022-09-14 RX ORDER — LOSARTAN POTASSIUM 50 MG/1
TABLET ORAL
Qty: 90 TABLET | Refills: 0 | Status: SHIPPED | OUTPATIENT
Start: 2022-09-14

## 2022-09-16 ENCOUNTER — RA CDI HCC (OUTPATIENT)
Dept: OTHER | Facility: HOSPITAL | Age: 77
End: 2022-09-16

## 2022-09-16 NOTE — PROGRESS NOTES
[address]  coding opportunities     E11 22     Chart Reviewed number of suggestions sent to Provider: 1     Patients Insurance     Medicare Insurance: [employer]

## 2022-09-19 ENCOUNTER — OFFICE VISIT (OUTPATIENT)
Dept: SLEEP CENTER | Facility: CLINIC | Age: 77
End: 2022-09-19
Payer: MEDICARE

## 2022-09-19 ENCOUNTER — TELEPHONE (OUTPATIENT)
Dept: FAMILY MEDICINE CLINIC | Facility: CLINIC | Age: 77
End: 2022-09-19

## 2022-09-19 VITALS
OXYGEN SATURATION: 95 % | HEART RATE: 70 BPM | SYSTOLIC BLOOD PRESSURE: 132 MMHG | BODY MASS INDEX: 36.95 KG/M2 | DIASTOLIC BLOOD PRESSURE: 98 MMHG | WEIGHT: 200.8 LBS | HEIGHT: 62 IN

## 2022-09-19 DIAGNOSIS — E66.9 OBESITY (BMI 30-39.9): ICD-10-CM

## 2022-09-19 DIAGNOSIS — G25.81 RESTLESS LEGS SYNDROME: ICD-10-CM

## 2022-09-19 DIAGNOSIS — R40.0 DAYTIME SLEEPINESS: ICD-10-CM

## 2022-09-19 DIAGNOSIS — R68.2 DRY MOUTH: ICD-10-CM

## 2022-09-19 DIAGNOSIS — K21.9 GASTROESOPHAGEAL REFLUX DISEASE, UNSPECIFIED WHETHER ESOPHAGITIS PRESENT: ICD-10-CM

## 2022-09-19 DIAGNOSIS — G47.33 OBSTRUCTIVE SLEEP APNEA: Primary | ICD-10-CM

## 2022-09-19 DIAGNOSIS — I10 BENIGN ESSENTIAL HYPERTENSION: ICD-10-CM

## 2022-09-19 PROCEDURE — 99214 OFFICE O/P EST MOD 30 MIN: CPT | Performed by: INTERNAL MEDICINE

## 2022-09-19 RX ORDER — GABAPENTIN 400 MG/1
400 CAPSULE ORAL
Qty: 90 CAPSULE | Refills: 3 | Status: SHIPPED | OUTPATIENT
Start: 2022-09-19

## 2022-09-19 NOTE — TELEPHONE ENCOUNTER
Patient had blood work done in April, 2022   I would suggest to discuss with PCP during appointment if additional blood work is needed at this time

## 2022-09-19 NOTE — TELEPHONE ENCOUNTER
Dr Lynnda Hatchet patient requesting routine lab orders for this Thursday upcoming appt  Patient would like to get labs done tomorrow morning if orders could be added to epic by another provider due to Dr Lynnda Hatchet absence

## 2022-09-19 NOTE — PROGRESS NOTES
Follow-Up Note - [address]  69 y o  female  :1945  QHQ:[id number]  DOS:2022    CC: I saw this patient for follow-up in clinic today for Sleep disordered breathing, Coexisting Sleep and Medical Problems  She is using a dream Station version 2 machine that she got as a replacement from [employer] several months ago    [address] [place] [address], Problem List, Medications & Allergies were reviewed in EMR  Interval changes: none reported  She  has a past medical history of Abnormal mammogram of right breast (2017), Actinic keratosis, Acute medial meniscal tear, left, initial encounter (2017), Arthritis, Basal cell carcinoma, Diabetes mellitus ([facility] [address] ), Generalized anxiety disorder (2017), GERD (gastroesophageal reflux disease), Herpes zoster (2016), Hyperlipidemia, Hypertension, Hypokalemia, Left medial knee pain, Localized osteoarthritis of left knee (03/15/2016), Post-traumatic headache (2017), Restless leg syndrome, and Squamous cell skin cancer  She has a current medication list which includes the following prescription(s): aspirin, atorvastatin, dorzolamide-timolol, fluorouracil, gabapentin, klor-con m10, losartan, metformin, nystatin, omeprazole, triamterene-hydrochlorothiazide, verapamil, and triamcinolone, and the following Facility-Administered Medications: lidocaine and triamcinolone acetonide  PHYSIOLOGICAL DATA REVIEW AND INTERPRETATION:    using PAP > 4 hours/night 100%  Estimated HERVE 0 4/hour with pressure of 11cm H2O; Patient has not been using ozone based devices to sanitize the machine  SUBJECTIVE: Regarding use of PAP, Loyd Smith reports:   · significant adverse effects: dry mouth/throat; has not noticed any fibres or foreign material in air line       · She is benefiting from use: sleeping better   · Restless leg symptoms are controlled on gabapentin  Sleep Routine: Loyd Smith reports getting 6-7 hrs sleep  ; she has no difficulty initiating, but reports diffculty maintaining sleep   Once in around 2 weeks she reports early morning awakenings and struggles to fall back asleep because of mind racing  She arises spontaneously and usually feels refreshed  Bobbi Engel reports episodic  Excessive Daytime Sleepiness, dozes off when sedentary at home  She rated herself at Total score: 8 /24 on the [place] Sleepiness Scale  Habits: reports that she has never smoked  She has never used smokeless tobacco ,  reports previous alcohol use ,  reports no history of drug use , Caffeine use:limited ; Exercise routine: "not enough"   ROS: reviewed & as attached  Significant for weight has been stable  She reported no nasal, respiratory or cardiac symptoms  Nayely Lawson EXAM: /98   Pulse 70   Ht 5' 2" (1 575 m)   Wt 91 1 kg (200 lb 12 8 oz)   SpO2 95%   BMI 36 73 kg/m²     Wt Readings from Last 3 Encounters:   09/19/22 91 1 kg (200 lb 12 8 oz)   08/12/22 91 2 kg (201 lb)   07/07/22 91 4 kg (201 lb 9 6 oz)      Patient is well groomed; well appearing  CNS: Alert, orientated, clear & coherent speech  Psych: cooperativeand in no distress  Mental state:appears normal   H&N: EOMI; NC/AT:no facial pressure marks, no rashes  Skin/Extrem: col & hydration normal; no edema  Resp: Respiratory effort is normal  Physical findings otherwise essentially unchanged from previous  IMPRESSION: Problem List Items & Comorbidities Addressed this Visit    1  Obstructive sleep apnea  PAP DME Resupply/Reorder   2  Restless legs syndrome     3  Daytime sleepiness     4  Dry mouth     5  Benign essential hypertension     6  Obesity (BMI 30-39 9)     7  Gastroesophageal reflux disease, unspecified whether esophagitis present         PLAN:  1  I reviewed results of prior studies and physiologic data with the patient  2  I discussed treatment options with risks and benefits  3  Treatment with  PAP is medically necessary and Bobbi Engel is agreable to continue use     4  Care of equipment, methods to improve comfort using PAP and importance of compliance with therapy were discussed  5  Pressure setting: continue 11 cmH2O     6  Rx provided to replace  supplies and Care coordinated with DME provider  7  Multi component Cognitive behavioral therapy for Insomnia undertaken - Sleep Restriction, Stimulus control, Relaxation techniques and Sleep hygiene were discussed  8  Continue gabapentin 400 mg q h s   9  Strategies to improve dry mouth were discussed  Specifically, adequate treatment of nasal allergies, adjusting heat and humidity settings on PAP machine, using Biotene mouthwash &/or Xylimelt  10  Discussed strategies for weight reduction  11  Follow-up is advised in 1 year or sooner if needed to monitor progress, compliance and to adjust therapy  Thank you for allowing me to participate in the care of this patient  Sincerely,     Authenticated electronically on 76/19/66   Board Certified Specialist     Portions of the record may have been created with voice recognition software  Occasional wrong word or "sound a like" substitutions may have occurred due to the inherent limitations of voice recognition software  There may also be notations and random deletions of words or characters from malfunctioning software  Read the chart carefully and recognize, using context, where substitutions/deletions have occurred

## 2022-09-19 NOTE — TELEPHONE ENCOUNTER
Forwarding to provider pool  Could someone enter this order on behalf of Dr Dc Payne as she is out today  Thank you?

## 2022-09-19 NOTE — PATIENT INSTRUCTIONS
Strategies to improve dry mouth were discussed  Specifically, adequate treatment of nasal allergies, adjusting heat and humidity settings on PAP machine, using Biotene mouthwash &/or Xylimelt  Nursing Support:  When: Monday through Friday 7A-5PM except holidays  Where: Our direct line is [phone]  If you are having a true emergency please call 911  In the event that the line is busy or it is after hours please leave a voice message and we will return your call  Please speak clearly, leaving your full name, birth date, best number to reach you and the reason for your call  Medication refills: We will need the name of the medication, the dosage, the ordering provider, whether you get a 30 or 90 day refill, and the pharmacy name and address  Medications will be ordered by the provider only  Nurses cannot call in prescriptions  Please allow 7 days for medication refills  Physician requested updates: If your provider requested that you call with an update after starting medication, please be ready to provide us the medication and dosage, what time you take your medication, the time you attempt to fall asleep, time you fall asleep, when you wake up, and what time you get out of bed  Sleep Study Results: We will contact you with sleep study results and/or next steps after the physician has reviewed your testing

## 2022-09-19 NOTE — PROGRESS NOTES
Review of Systems      Genitourinary none   Cardiology none   Gastrointestinal none   Neurology none   Constitutional none   Integumentary none   Psychiatry none   Musculoskeletal back pain and sciatica   Pulmonary none   ENT throat clearing   Endocrine none   Hematological none

## 2022-09-20 ENCOUNTER — TELEPHONE (OUTPATIENT)
Dept: SLEEP CENTER | Facility: CLINIC | Age: 77
End: 2022-09-20

## 2022-09-22 ENCOUNTER — OFFICE VISIT (OUTPATIENT)
Dept: FAMILY MEDICINE CLINIC | Facility: CLINIC | Age: 77
End: 2022-09-22

## 2022-09-22 VITALS
WEIGHT: 199.2 LBS | SYSTOLIC BLOOD PRESSURE: 134 MMHG | HEART RATE: 86 BPM | DIASTOLIC BLOOD PRESSURE: 76 MMHG | OXYGEN SATURATION: 98 % | RESPIRATION RATE: 18 BRPM | HEIGHT: 62 IN | BODY MASS INDEX: 36.66 KG/M2 | TEMPERATURE: 97.3 F

## 2022-09-22 DIAGNOSIS — R73.01 ELEVATED FASTING BLOOD SUGAR: ICD-10-CM

## 2022-09-22 DIAGNOSIS — I10 BENIGN ESSENTIAL HYPERTENSION: ICD-10-CM

## 2022-09-22 DIAGNOSIS — N18.31 TYPE 2 DIABETES MELLITUS WITH STAGE 3A CHRONIC KIDNEY DISEASE, WITH LONG-TERM CURRENT USE OF INSULIN (HCC): ICD-10-CM

## 2022-09-22 DIAGNOSIS — Z79.4 TYPE 2 DIABETES MELLITUS WITH STAGE 3A CHRONIC KIDNEY DISEASE, WITH LONG-TERM CURRENT USE OF INSULIN (HCC): ICD-10-CM

## 2022-09-22 DIAGNOSIS — E11.9 TYPE 2 DIABETES MELLITUS WITHOUT COMPLICATION, WITHOUT LONG-TERM CURRENT USE OF INSULIN (HCC): Primary | ICD-10-CM

## 2022-09-22 DIAGNOSIS — E78.5 HYPERLIPIDEMIA, UNSPECIFIED HYPERLIPIDEMIA TYPE: ICD-10-CM

## 2022-09-22 DIAGNOSIS — E11.22 TYPE 2 DIABETES MELLITUS WITH STAGE 3A CHRONIC KIDNEY DISEASE, WITH LONG-TERM CURRENT USE OF INSULIN (HCC): ICD-10-CM

## 2022-09-22 PROCEDURE — 99214 OFFICE O/P EST MOD 30 MIN: CPT | Performed by: FAMILY MEDICINE

## 2022-09-22 NOTE — PROGRESS NOTES
Name: Geralynn Siemens      : 1945      MRN: [id number]  Encounter Provider: Josey Schwarz  Encounter Date: 2022   Encounter department: [facility] [place]    Assessment & Plan     Type 2 diabetes mellitus without complication ([facility] )    Lab Results   Component Value Date    HGBA1C 6 4 (H) 2022   on metformin - well controlled    Hyperlipidemia  On atorvastatin - repeat lipids ordered    Benign essential hypertension    Hypertension- patients hypertension is controlled today with a Blood pressure of Blood Pressure: 134/76     on current medications  Continue current medications  Discussed DASH diet and exercise  Patient did have a higher BP at a previous appt but that has not been sustained      BMI Counseling: Body mass index is 36 43 kg/m²  The BMI is above normal  Nutrition recommendations include decreasing portion sizes and encouraging healthy choices of fruits and vegetables  Exercise recommendations include moderate physical activity 150 minutes/week  Rationale for BMI follow-up plan is due to patient being overweight or obese  Subjective     Had covid booster  Will get flu  Has not gotten shingles vaccine due to cost/access    1 - eleved BP at sleep doctor  2 - concern about dx of CKD  3 - had verapamil for headache and was taken off amlodipine was concerned about the BP with those changes  4 - thinks she has diabetic neuropathy - she has a feeling of less sensation in her feet or that she is walking on "sponges"  Usually ok when walking but does have that feeling  5 - had an episode of sciatica and had soreness in her buttocks with pain down the side of the leg to knee  Took a few months  6 - had an episode of pain her groin when reaching forward      Review of Systems   Constitutional: Negative for activity change, chills, fever and unexpected weight change  HENT: Negative for congestion  Eyes: Negative for visual disturbance  Respiratory: Negative for cough, chest tightness, shortness of breath and wheezing  Cardiovascular: Negative for chest pain  Gastrointestinal: Negative for abdominal pain, diarrhea and nausea  Endocrine: Negative for cold intolerance and heat intolerance  Musculoskeletal: Negative for arthralgias and myalgias  Skin: Negative for color change  Neurological: Negative for dizziness  Psychiatric/Behavioral: Negative for agitation, dysphoric mood and sleep disturbance         Past Medical History:   Diagnosis Date    Abnormal mammogram of right breast 06/29/2017    Actinic keratosis     Acute medial meniscal tear, left, initial encounter 06/29/2017    Arthritis     Basal cell carcinoma     Diabetes mellitus ([facility] [address] )     Generalized anxiety disorder 08/31/2017    GERD (gastroesophageal reflux disease)     Herpes zoster 09/26/2016    Hyperlipidemia     Hypertension     Hypokalemia     Left medial knee pain     Localized osteoarthritis of left knee 03/15/2016    Post-traumatic headache 09/19/2017    Restless leg syndrome     Squamous cell skin cancer      Past Surgical History:   Procedure Laterality Date    ANKLE FRACTURE SURGERY Right     ARTHROSCOPY KNEE Left 5/26/2016    Procedure: ARTHROSCOPY KNEE, PARTILA MEDIAL MENISECTOMY;  Surgeon: Jeronimo Campuzano MD;  Location: [facility];  Service:    [facility] CHOLECYSTECTOMY      HYSTERECTOMY      65    OOPHORECTOMY      65    SQUAMOUS CELL CARCINOMA EXCISION      forehead     Family History   Problem Relation Age of Onset    Stroke Mother     Stroke Father     Cerebral aneurysm Sister     Uterine cancer Sister 54    No Known Problems Sister     No Known Problems Daughter     No Known Problems Maternal Grandmother     No Known Problems Maternal Grandfather     No Known Problems Paternal Grandmother     No Known Problems Paternal Grandfather     No Known Problems Maternal Aunt     No Known Problems Maternal Aunt     No Known Problems Maternal Aunt     Hypertension Other     Diabetes Other     Breast cancer Neg Hx      Social History     Socioeconomic History    Marital status: /Civil Union     Spouse name: None    Number of children: None    Years of education: None    Highest education level: None   Occupational History    None   Tobacco Use    Smoking status: Never Smoker    Smokeless tobacco: Never Used   Vaping Use    Vaping Use: Never used   Substance and Sexual Activity    Alcohol use: Not Currently     Alcohol/week: 0 0 standard drinks     Comment: rare    Drug use: No    Sexual activity: Not Currently     Partners: Male     Birth control/protection: Female Sterilization   Other Topics Concern    None   Social History Narrative    None     Social Determinants of Health     Financial Resource Strain: Low Risk     Difficulty of Paying Living Expenses: Not hard at all   Food Insecurity: No Food Insecurity    Worried About Running Out of Food in the Last Year: Never true    Shorty of Food in the Last Year: Never true   Transportation Needs: No Transportation Needs    Lack of Transportation (Medical): No    Lack of Transportation (Non-Medical): No   Physical Activity: Not on file   Stress: Not on file   Social Connections: Not on file   Intimate Partner Violence: Not At Risk    Fear of Current or Ex-Partner: No    Emotionally Abused: No    Physically Abused: No    Sexually Abused: No   Housing Stability: Low Risk     Unable to Pay for Housing in the Last Year: No    Number of Places Lived in the Last Year: 1    Unstable Housing in the Last Year: No     Current Outpatient Medications on File Prior to Visit   Medication Sig    aspirin 81 MG tablet Take 81 mg by mouth daily      atorvastatin (LIPITOR) 10 mg tablet TAKE 1 TABLET BY MOUTH EVERY DAY    dorzolamide-timolol (COSOPT) 22 3-6 8 MG/ML ophthalmic solution INSTILL 1 DROP INTO BOTH EYES TWICE A DAY    fluorouracil (EFUDEX) 5 % cream Apply 1 application topically as needed     gabapentin (NEURONTIN) 400 mg capsule Take 1 capsule (400 mg total) by mouth daily at bedtime    Klor-Con M10 10 MEQ tablet TAKE 1 TABLET (10 MEQ TOTAL) BY MOUTH 2 (TWO) TIMES A DAY FOR 90 DAYS    losartan (COZAAR) 50 mg tablet TAKE 1 TABLET BY MOUTH EVERY DAY    metFORMIN (GLUCOPHAGE-XR) 500 mg 24 hr tablet TAKE 1 TABLET BY MOUTH EVERY DAY WITH DINNER    nystatin (MYCOSTATIN) cream Apply topically 2 (two) times a day    omeprazole (PriLOSEC) 10 mg delayed release capsule TAKE 1 CAPSULE BY MOUTH EVERY DAY    triamcinolone (KENALOG) 0 5 % ointment Apply topically 2 (two) times a day    triamterene-hydrochlorothiazide (DYAZIDE) 37 5-25 mg per capsule TAKE 1 CAPSULE BY MOUTH EVERY DAY    verapamil (CALAN) 40 mg tablet TAKE 1 TABLET BY MOUTH THREE TIMES A DAY     Allergies   Allergen Reactions    Penicillins Rash     itching     Immunization History   Administered Date(s) Administered    COVID-19 PFIZER VACCINE 0 3 ML IM 01/21/2021, 02/10/2021, 10/16/2021    INFLUENZA 09/26/2016, 11/03/2017, 09/21/2018    Influenza Quadrivalent Preservative Free 3 years and older IM 10/20/2014    Influenza Split High Dose Preservative Free IM 09/26/2016, 10/03/2019    Influenza, high dose seasonal 0 7 mL 09/17/2020, 09/16/2021    Influenza, seasonal, injectable 11/18/2013    Influenza, seasonal, injectable, preservative free 10/01/2015    Pneumococcal Conjugate 13-Valent 01/07/2016    Pneumococcal Polysaccharide PPV23 02/04/2013    Zoster 05/06/2014       Objective     /76 (BP Location: Left arm, Patient Position: Sitting, Cuff Size: Large)   Pulse 86   Temp (!) 97 3 °F (36 3 °C) (Temporal)   Resp 18   Ht 5' 2" (1 575 m)   Wt 90 4 kg (199 lb 3 2 oz)   SpO2 98%   BMI 36 43 kg/m²     Physical Exam  Constitutional:       Appearance: She is well-developed  HENT:      Head: Normocephalic and atraumatic  Cardiovascular:      Rate and Rhythm: Normal rate and regular rhythm  Pulses: no weak pulses          Dorsalis pedis pulses are 2+ on the right side and 2+ on the left side  Posterior tibial pulses are 2+ on the right side and 2+ on the left side  Heart sounds: Normal heart sounds  Pulmonary:      Effort: Pulmonary effort is normal       Breath sounds: Normal breath sounds  Abdominal:      General: Bowel sounds are normal       Palpations: Abdomen is soft  Musculoskeletal:         General: Normal range of motion  Feet:      Right foot:      Skin integrity: No ulcer, skin breakdown, erythema, warmth, callus or dry skin  Left foot:      Skin integrity: No ulcer, skin breakdown, erythema, warmth, callus or dry skin  Skin:     General: Skin is warm and dry  Neurological:      Mental Status: She is alert and oriented to person, place, and time  Psychiatric:         Behavior: Behavior normal          Judgment: Judgment normal      Diabetic Foot Exam    Patient's shoes and socks removed  Right Foot/Ankle   Right Foot Inspection  Skin Exam: skin normal and skin intact  No dry skin, no warmth, no callus, no erythema, no maceration, no abnormal color, no pre-ulcer, no ulcer and no callus  Toe Exam: ROM and strength within normal limits  Sensory   Monofilament testing: intact    Vascular  Capillary refills: < 3 seconds  The right DP pulse is 2+  The right PT pulse is 2+  Left Foot/Ankle  Left Foot Inspection  Skin Exam: skin normal and skin intact  No dry skin, no warmth, no erythema, no maceration, normal color, no pre-ulcer, no ulcer and no callus  Toe Exam: ROM and strength within normal limits  Sensory   Monofilament testing: intact    Vascular  Capillary refills: < 3 seconds  The left DP pulse is 2+  The left PT pulse is 2+       Assign Risk Category  No deformity present  No loss of protective sensation  No weak pulses  Risk: 0    Erick List

## 2022-09-22 NOTE — ASSESSMENT & PLAN NOTE
Hypertension- patients hypertension is controlled today with a Blood pressure of Blood Pressure: 134/76     on current medications  Continue current medications  Discussed DASH diet and exercise    Patient did have a higher BP at a previous appt but that has not been sustained

## 2022-09-26 ENCOUNTER — APPOINTMENT (OUTPATIENT)
Dept: LAB | Facility: CLINIC | Age: 77
End: 2022-09-26
Payer: MEDICARE

## 2022-09-26 DIAGNOSIS — R73.01 ELEVATED FASTING BLOOD SUGAR: ICD-10-CM

## 2022-09-26 DIAGNOSIS — I10 BENIGN ESSENTIAL HYPERTENSION: ICD-10-CM

## 2022-09-26 DIAGNOSIS — E78.5 HYPERLIPIDEMIA, UNSPECIFIED HYPERLIPIDEMIA TYPE: ICD-10-CM

## 2022-09-26 DIAGNOSIS — E11.9 TYPE 2 DIABETES MELLITUS WITHOUT COMPLICATION, WITHOUT LONG-TERM CURRENT USE OF INSULIN (HCC): ICD-10-CM

## 2022-09-26 LAB
ALBUMIN SERPL BCP-MCNC: 4.3 G/DL (ref 3.5–5)
ALP SERPL-CCNC: 66 U/L (ref 34–104)
ALT SERPL W P-5'-P-CCNC: 21 U/L (ref 7–52)
ANION GAP SERPL CALCULATED.3IONS-SCNC: 8 MMOL/L (ref 4–13)
AST SERPL W P-5'-P-CCNC: 20 U/L (ref 13–39)
BASOPHILS # BLD AUTO: 0.06 THOUSANDS/ΜL (ref 0–0.1)
BASOPHILS NFR BLD AUTO: 1 % (ref 0–1)
BILIRUB SERPL-MCNC: 0.59 MG/DL (ref 0.2–1)
BUN SERPL-MCNC: 23 MG/DL (ref 5–25)
CALCIUM SERPL-MCNC: 9.6 MG/DL (ref 8.4–10.2)
CHLORIDE SERPL-SCNC: 103 MMOL/L (ref 96–108)
CHOLEST SERPL-MCNC: 115 MG/DL
CO2 SERPL-SCNC: 29 MMOL/L (ref 21–32)
CREAT SERPL-MCNC: 0.97 MG/DL (ref 0.6–1.3)
EOSINOPHIL # BLD AUTO: 0.2 THOUSAND/ΜL (ref 0–0.61)
EOSINOPHIL NFR BLD AUTO: 3 % (ref 0–6)
ERYTHROCYTE [DISTWIDTH] IN BLOOD BY AUTOMATED COUNT: 13.5 % (ref 11.6–15.1)
EST. AVERAGE GLUCOSE BLD GHB EST-MCNC: 137 MG/DL
GFR SERPL CREATININE-BSD FRML MDRD: 56 ML/MIN/1.73SQ M
GLUCOSE P FAST SERPL-MCNC: 139 MG/DL (ref 65–99)
HBA1C MFR BLD: 6.4 %
HCT VFR BLD AUTO: 41.7 % (ref 34.8–46.1)
HDLC SERPL-MCNC: 48 MG/DL
HGB BLD-MCNC: 13.4 G/DL (ref 11.5–15.4)
IMM GRANULOCYTES # BLD AUTO: 0.04 THOUSAND/UL (ref 0–0.2)
IMM GRANULOCYTES NFR BLD AUTO: 1 % (ref 0–2)
LDLC SERPL CALC-MCNC: 42 MG/DL (ref 0–100)
LYMPHOCYTES # BLD AUTO: 2.11 THOUSANDS/ΜL (ref 0.6–4.47)
LYMPHOCYTES NFR BLD AUTO: 28 % (ref 14–44)
MCH RBC QN AUTO: 30.7 PG (ref 26.8–34.3)
MCHC RBC AUTO-ENTMCNC: 32.1 G/DL (ref 31.4–37.4)
MCV RBC AUTO: 95 FL (ref 82–98)
MONOCYTES # BLD AUTO: 0.73 THOUSAND/ΜL (ref 0.17–1.22)
MONOCYTES NFR BLD AUTO: 10 % (ref 4–12)
NEUTROPHILS # BLD AUTO: 4.38 THOUSANDS/ΜL (ref 1.85–7.62)
NEUTS SEG NFR BLD AUTO: 57 % (ref 43–75)
NRBC BLD AUTO-RTO: 0 /100 WBCS
PLATELET # BLD AUTO: 328 THOUSANDS/UL (ref 149–390)
PMV BLD AUTO: 10.8 FL (ref 8.9–12.7)
POTASSIUM SERPL-SCNC: 4.3 MMOL/L (ref 3.5–5.3)
PROT SERPL-MCNC: 7.2 G/DL (ref 6.4–8.4)
RBC # BLD AUTO: 4.37 MILLION/UL (ref 3.81–5.12)
SODIUM SERPL-SCNC: 140 MMOL/L (ref 135–147)
TRIGL SERPL-MCNC: 126 MG/DL
WBC # BLD AUTO: 7.52 THOUSAND/UL (ref 4.31–10.16)

## 2022-09-26 PROCEDURE — 36415 COLL VENOUS BLD VENIPUNCTURE: CPT

## 2022-09-26 PROCEDURE — 83036 HEMOGLOBIN GLYCOSYLATED A1C: CPT

## 2022-09-26 PROCEDURE — 85025 COMPLETE CBC W/AUTO DIFF WBC: CPT

## 2022-09-26 PROCEDURE — 80061 LIPID PANEL: CPT

## 2022-09-26 PROCEDURE — 80053 COMPREHEN METABOLIC PANEL: CPT

## 2022-09-27 ENCOUNTER — TELEPHONE (OUTPATIENT)
Dept: OBGYN CLINIC | Facility: CLINIC | Age: 77
End: 2022-09-27

## 2022-09-27 DIAGNOSIS — N89.8 VAGINAL ITCHING: ICD-10-CM

## 2022-09-27 RX ORDER — TRIAMCINOLONE ACETONIDE 5 MG/G
OINTMENT TOPICAL 2 TIMES DAILY
Qty: 15 G | Refills: 0 | Status: SHIPPED | OUTPATIENT
Start: 2022-09-27 | End: 2022-10-11

## 2022-09-27 NOTE — TELEPHONE ENCOUNTER
----- Message from [employer] sent at 9/27/2022 10:44 AM EDT -----  Regarding: Agustín Brower - lichen sclerosus  It appears the itching has returned and I was wondering if you could call in refills for the ointments I had been using  Triamcinolone and Nystatin with directions whether to use both simultaneously or one medication is enough  I don't think an office appointment is necessary but I will leave that to you  I am not scheduled until January 2023 I believe  Thank you for your assistance  [facility] should be in my records    Agustín Brower - 1945

## 2022-10-17 DIAGNOSIS — I10 BENIGN ESSENTIAL HYPERTENSION: ICD-10-CM

## 2022-10-17 DIAGNOSIS — E11.9 TYPE 2 DIABETES MELLITUS WITHOUT COMPLICATION, WITHOUT LONG-TERM CURRENT USE OF INSULIN (HCC): ICD-10-CM

## 2022-10-17 RX ORDER — METFORMIN HYDROCHLORIDE 500 MG/1
TABLET, EXTENDED RELEASE ORAL
Qty: 90 TABLET | Refills: 1 | Status: SHIPPED | OUTPATIENT
Start: 2022-10-17

## 2022-10-17 RX ORDER — POTASSIUM CHLORIDE 750 MG/1
TABLET, EXTENDED RELEASE ORAL
Qty: 180 TABLET | Refills: 1 | Status: SHIPPED | OUTPATIENT
Start: 2022-10-17

## 2022-12-09 DIAGNOSIS — K21.9 GASTROESOPHAGEAL REFLUX DISEASE WITHOUT ESOPHAGITIS: ICD-10-CM

## 2022-12-09 RX ORDER — OMEPRAZOLE 10 MG/1
CAPSULE, DELAYED RELEASE ORAL
Qty: 90 CAPSULE | Refills: 1 | Status: SHIPPED | OUTPATIENT
Start: 2022-12-09

## 2022-12-11 DIAGNOSIS — I10 HYPERTENSION, UNSPECIFIED TYPE: ICD-10-CM

## 2022-12-12 RX ORDER — LOSARTAN POTASSIUM 50 MG/1
TABLET ORAL
Qty: 90 TABLET | Refills: 0 | Status: SHIPPED | OUTPATIENT
Start: 2022-12-12

## 2023-01-12 DIAGNOSIS — E11.9 TYPE 2 DIABETES MELLITUS WITHOUT COMPLICATION, WITHOUT LONG-TERM CURRENT USE OF INSULIN (HCC): Primary | ICD-10-CM

## 2023-01-12 DIAGNOSIS — I10 BENIGN ESSENTIAL HYPERTENSION: ICD-10-CM

## 2023-01-18 ENCOUNTER — OFFICE VISIT (OUTPATIENT)
Dept: OBGYN CLINIC | Facility: CLINIC | Age: 78
End: 2023-01-18

## 2023-01-18 VITALS — SYSTOLIC BLOOD PRESSURE: 120 MMHG | WEIGHT: 199.8 LBS | DIASTOLIC BLOOD PRESSURE: 86 MMHG | BODY MASS INDEX: 36.54 KG/M2

## 2023-01-18 DIAGNOSIS — N90.89 VULVAR IRRITATION: Primary | ICD-10-CM

## 2023-01-18 NOTE — PROGRESS NOTES
Assessment/Plan:    Suspect continued symptoms from lichen sclerosus - biopsy today  Discussed expectation to be long term use of high dose topical steroid to suppress symptoms  Will follow up pending biopsy result  To avoid menstrual products like Always for daily use  Poise pads/urinary pads preferred  Vulvar irritation  -     Biopsy        Subjective:      Patient ID: Andi Grandchild is a 69 y o  female  Cj Hardy presents for follow up  She has been treated for lichen sclerosus - she will use cream for a few days and feel much better, but then symptoms will return in 2-3 weeks  Has not used any cream since October  Does have incontinence and uses liner daily - will use multiple brands depending on sales and coupons  The following portions of the patient's history were reviewed and updated as appropriate: allergies, current medications and problem list     Review of Systems      Objective:      /86 (BP Location: Right arm, Patient Position: Sitting, Cuff Size: Standard)   Wt 90 6 kg (199 lb 12 8 oz)   BMI 36 54 kg/m²          Physical Exam  Exam conducted with a chaperone present     Genitourinary:         Comments: Mild erythema of perineal region  Mild hypopigmentation surrounding posterior fourchette

## 2023-01-18 NOTE — PROGRESS NOTES
Biopsy    Date/Time: 1/18/2023 1:46 PM  Performed by: Aashish Henriquez MD  Authorized by: Aashish Henriquez MD   Universal Protocol:  Consent: Verbal consent obtained  Risks and benefits: risks, benefits and alternatives were discussed  Consent given by: patient  Patient identity confirmed: verbally with patient      Procedure Details - Lesion Biopsy: Body area: Anogenital    Anogenital location:  Vulva    Vaginal Lesion: No      Biopsy method: punch biopsy      Biopsy tissue type: skin     Left perineum  Lidocaine infiltrated for pain control  Hemostasis with pressure and silver nitrate  Patient tolerated well

## 2023-01-24 ENCOUNTER — TELEPHONE (OUTPATIENT)
Dept: OBGYN CLINIC | Facility: CLINIC | Age: 78
End: 2023-01-24

## 2023-01-24 DIAGNOSIS — L90.0 LICHEN SCLEROSUS: Primary | ICD-10-CM

## 2023-01-24 NOTE — TELEPHONE ENCOUNTER
----- Message from Angie Cali MD sent at 1/24/2023 12:31 PM EST -----  Please let Saroj Caceres know that the biopsy confirms lichen sclerosus - which is what I expected  No evidence of infection  I will send rx for an ointment to her pharmacy - I would like her to use this daily for 8 weeks minimum     Thanks

## 2023-02-05 DIAGNOSIS — E78.5 HYPERLIPIDEMIA, UNSPECIFIED HYPERLIPIDEMIA TYPE: ICD-10-CM

## 2023-02-06 RX ORDER — ATORVASTATIN CALCIUM 10 MG/1
TABLET, FILM COATED ORAL
Qty: 90 TABLET | Refills: 1 | Status: SHIPPED | OUTPATIENT
Start: 2023-02-06

## 2023-02-17 ENCOUNTER — LAB REQUISITION (OUTPATIENT)
Dept: LAB | Facility: HOSPITAL | Age: 78
End: 2023-02-17

## 2023-02-17 DIAGNOSIS — K62.1 RECTAL POLYP: ICD-10-CM

## 2023-02-17 DIAGNOSIS — Z86.010 PERSONAL HISTORY OF COLONIC POLYPS: ICD-10-CM

## 2023-03-10 DIAGNOSIS — I10 ESSENTIAL HYPERTENSION: ICD-10-CM

## 2023-03-10 RX ORDER — TRIAMTERENE AND HYDROCHLOROTHIAZIDE 37.5; 25 MG/1; MG/1
CAPSULE ORAL
Qty: 90 CAPSULE | Refills: 1 | Status: SHIPPED | OUTPATIENT
Start: 2023-03-10

## 2023-03-13 ENCOUNTER — TELEPHONE (OUTPATIENT)
Dept: FAMILY MEDICINE CLINIC | Facility: CLINIC | Age: 78
End: 2023-03-13

## 2023-03-13 NOTE — TELEPHONE ENCOUNTER
Folder (To be completed by) -Dr Lyndsey Forde      Name of Form -  Physician's order     Color folder Delon Griffith)    Form to be Faxed (Fax #):[phone]    Patient was made aware of the 7-10 business day form policy

## 2023-03-16 ENCOUNTER — APPOINTMENT (OUTPATIENT)
Dept: LAB | Facility: CLINIC | Age: 78
End: 2023-03-16

## 2023-03-16 DIAGNOSIS — I10 BENIGN ESSENTIAL HYPERTENSION: ICD-10-CM

## 2023-03-16 DIAGNOSIS — E11.9 TYPE 2 DIABETES MELLITUS WITHOUT COMPLICATION, WITHOUT LONG-TERM CURRENT USE OF INSULIN (HCC): ICD-10-CM

## 2023-03-16 DIAGNOSIS — E78.5 HYPERLIPIDEMIA, UNSPECIFIED HYPERLIPIDEMIA TYPE: ICD-10-CM

## 2023-03-16 LAB
ALBUMIN SERPL BCP-MCNC: 4.3 G/DL (ref 3.5–5)
ALP SERPL-CCNC: 72 U/L (ref 34–104)
ALT SERPL W P-5'-P-CCNC: 20 U/L (ref 7–52)
ANION GAP SERPL CALCULATED.3IONS-SCNC: 8 MMOL/L (ref 4–13)
AST SERPL W P-5'-P-CCNC: 19 U/L (ref 13–39)
BASOPHILS # BLD AUTO: 0.07 THOUSANDS/ÂΜL (ref 0–0.1)
BASOPHILS NFR BLD AUTO: 1 % (ref 0–1)
BILIRUB SERPL-MCNC: 0.61 MG/DL (ref 0.2–1)
BUN SERPL-MCNC: 16 MG/DL (ref 5–25)
CALCIUM SERPL-MCNC: 9.8 MG/DL (ref 8.4–10.2)
CHLORIDE SERPL-SCNC: 101 MMOL/L (ref 96–108)
CHOLEST SERPL-MCNC: 138 MG/DL
CO2 SERPL-SCNC: 32 MMOL/L (ref 21–32)
CREAT SERPL-MCNC: 0.98 MG/DL (ref 0.6–1.3)
EOSINOPHIL # BLD AUTO: 0.24 THOUSAND/ÂΜL (ref 0–0.61)
EOSINOPHIL NFR BLD AUTO: 3 % (ref 0–6)
ERYTHROCYTE [DISTWIDTH] IN BLOOD BY AUTOMATED COUNT: 13.6 % (ref 11.6–15.1)
EST. AVERAGE GLUCOSE BLD GHB EST-MCNC: 143 MG/DL
GFR SERPL CREATININE-BSD FRML MDRD: 55 ML/MIN/1.73SQ M
GLUCOSE P FAST SERPL-MCNC: 136 MG/DL (ref 65–99)
HBA1C MFR BLD: 6.6 %
HCT VFR BLD AUTO: 41.6 % (ref 34.8–46.1)
HDLC SERPL-MCNC: 59 MG/DL
HGB BLD-MCNC: 13.2 G/DL (ref 11.5–15.4)
IMM GRANULOCYTES # BLD AUTO: 0.04 THOUSAND/UL (ref 0–0.2)
IMM GRANULOCYTES NFR BLD AUTO: 1 % (ref 0–2)
LDLC SERPL CALC-MCNC: 45 MG/DL (ref 0–100)
LYMPHOCYTES # BLD AUTO: 2.97 THOUSANDS/ÂΜL (ref 0.6–4.47)
LYMPHOCYTES NFR BLD AUTO: 38 % (ref 14–44)
MCH RBC QN AUTO: 29.9 PG (ref 26.8–34.3)
MCHC RBC AUTO-ENTMCNC: 31.7 G/DL (ref 31.4–37.4)
MCV RBC AUTO: 94 FL (ref 82–98)
MONOCYTES # BLD AUTO: 0.71 THOUSAND/ÂΜL (ref 0.17–1.22)
MONOCYTES NFR BLD AUTO: 9 % (ref 4–12)
NEUTROPHILS # BLD AUTO: 3.77 THOUSANDS/ÂΜL (ref 1.85–7.62)
NEUTS SEG NFR BLD AUTO: 48 % (ref 43–75)
NRBC BLD AUTO-RTO: 0 /100 WBCS
PLATELET # BLD AUTO: 319 THOUSANDS/UL (ref 149–390)
PMV BLD AUTO: 10.5 FL (ref 8.9–12.7)
POTASSIUM SERPL-SCNC: 3.8 MMOL/L (ref 3.5–5.3)
PROT SERPL-MCNC: 7.4 G/DL (ref 6.4–8.4)
RBC # BLD AUTO: 4.41 MILLION/UL (ref 3.81–5.12)
SODIUM SERPL-SCNC: 141 MMOL/L (ref 135–147)
TRIGL SERPL-MCNC: 172 MG/DL
WBC # BLD AUTO: 7.8 THOUSAND/UL (ref 4.31–10.16)

## 2023-03-22 DIAGNOSIS — I10 HYPERTENSION, UNSPECIFIED TYPE: ICD-10-CM

## 2023-03-22 RX ORDER — LOSARTAN POTASSIUM 50 MG/1
TABLET ORAL
Qty: 90 TABLET | Refills: 0 | Status: SHIPPED | OUTPATIENT
Start: 2023-03-22

## 2023-03-22 NOTE — TELEPHONE ENCOUNTER
Good Morning Dr Pino Gambino   Please review and refill if appropriate   Patient had schedule appointment with you tomorrow at 10:30 am  Thanks

## 2023-03-23 ENCOUNTER — OFFICE VISIT (OUTPATIENT)
Dept: FAMILY MEDICINE CLINIC | Facility: CLINIC | Age: 78
End: 2023-03-23

## 2023-03-23 VITALS
OXYGEN SATURATION: 94 % | HEIGHT: 62 IN | DIASTOLIC BLOOD PRESSURE: 76 MMHG | SYSTOLIC BLOOD PRESSURE: 116 MMHG | HEART RATE: 62 BPM | TEMPERATURE: 98.3 F | WEIGHT: 200.8 LBS | BODY MASS INDEX: 36.95 KG/M2

## 2023-03-23 DIAGNOSIS — J44.9 OSA AND COPD OVERLAP SYNDROME (HCC): ICD-10-CM

## 2023-03-23 DIAGNOSIS — I10 ESSENTIAL HYPERTENSION: ICD-10-CM

## 2023-03-23 DIAGNOSIS — E11.22 TYPE 2 DIABETES MELLITUS WITH STAGE 3A CHRONIC KIDNEY DISEASE, WITH LONG-TERM CURRENT USE OF INSULIN (HCC): ICD-10-CM

## 2023-03-23 DIAGNOSIS — E11.9 TYPE 2 DIABETES MELLITUS WITHOUT COMPLICATION, WITHOUT LONG-TERM CURRENT USE OF INSULIN (HCC): Primary | ICD-10-CM

## 2023-03-23 DIAGNOSIS — N18.31 TYPE 2 DIABETES MELLITUS WITH STAGE 3A CHRONIC KIDNEY DISEASE, WITH LONG-TERM CURRENT USE OF INSULIN (HCC): ICD-10-CM

## 2023-03-23 DIAGNOSIS — E66.01 MORBID OBESITY (HCC): ICD-10-CM

## 2023-03-23 DIAGNOSIS — K21.9 GASTROESOPHAGEAL REFLUX DISEASE, UNSPECIFIED WHETHER ESOPHAGITIS PRESENT: ICD-10-CM

## 2023-03-23 DIAGNOSIS — M17.12 PRIMARY LOCALIZED OSTEOARTHRITIS OF LEFT KNEE: ICD-10-CM

## 2023-03-23 DIAGNOSIS — E78.5 HYPERLIPIDEMIA, UNSPECIFIED HYPERLIPIDEMIA TYPE: ICD-10-CM

## 2023-03-23 DIAGNOSIS — Z79.4 TYPE 2 DIABETES MELLITUS WITH STAGE 3A CHRONIC KIDNEY DISEASE, WITH LONG-TERM CURRENT USE OF INSULIN (HCC): ICD-10-CM

## 2023-03-23 DIAGNOSIS — G47.33 OSA AND COPD OVERLAP SYNDROME (HCC): ICD-10-CM

## 2023-03-23 DIAGNOSIS — Z12.31 ENCOUNTER FOR SCREENING MAMMOGRAM FOR MALIGNANT NEOPLASM OF BREAST: ICD-10-CM

## 2023-03-23 NOTE — ASSESSMENT & PLAN NOTE
Lab Results   Component Value Date    HGBA1C 6 6 (H) 03/16/2023   continue metformin daily  Well controlled A1C

## 2023-03-23 NOTE — ASSESSMENT & PLAN NOTE
Hyperlipidemia - 69 y  o female being evaluated for HLD  No myalgias  Continue medication  Last LDL   Lab Results   Component Value Date    LDLCALC 45 03/16/2023     on statin - no complications   continue

## 2023-03-23 NOTE — PATIENT INSTRUCTIONS
Stop omeprazole  If pain returns, start pepcid or famotidine  If you do this, it is probably worth seeing GI so let me know and we can help with that

## 2023-03-23 NOTE — PROGRESS NOTES
Name: Gloria Haskins      : 1945      MRN: [id number]  Encounter Provider: Hema Cameron  Encounter Date: 3/23/2023   Encounter department: [address]    Assessment & Plan     1  Type 2 diabetes mellitus without complication, without long-term current use of insulin (HCC)  Assessment & Plan:    Lab Results   Component Value Date    HGBA1C 6 6 (H) 2023   continue metformin daily  Well controlled A1C      2  Hyperlipidemia, unspecified hyperlipidemia type  Assessment & Plan:  Hyperlipidemia - 69 y  o female being evaluated for HLD  No myalgias  Continue medication  Last LDL   Lab Results   Component Value Date    LDLCALC 45 2023     on statin - no complications  continue      3  Gastroesophageal reflux disease, unspecified whether esophagitis present  Assessment & Plan:  Continue omeprazole as needed  4  Essential hypertension    5  Primary localized osteoarthritis of left knee  -     Ambulatory Referral to Orthopedic Surgery; Future    6  Encounter for screening mammogram for malignant neoplasm of breast  -     Mammo screening bilateral w 3d & cad; Future; Expected date: 2023    7  JAYA and COPD overlap syndrome ([facility] [address] )    8  Morbid obesity ([facility] )    9  Type 2 diabetes mellitus with stage 3a chronic kidney disease, with long-term current use of insulin (HCC)         Subjective     Hypertension - denies CP/SOB/HA  Compliant with medications  No known episodes of hypotension  Diabetes Mellitus type 2 - denies polyuria, polydipsia, cp/SOB  Compliant with medications  Home blood sugar monitoring  Hyperlipidemia - no cp/sob/myalgias  Compliant with medications  Had meniscus tear in right knee and repair but having increased pain and has some pain with certain movements  She doesn't want a knee replacement  Review of Systems   Constitutional: Negative for activity change, chills, fever and unexpected weight change     HENT: Negative for congestion  Eyes: Negative for visual disturbance  Respiratory: Negative for cough, chest tightness, shortness of breath and wheezing  Cardiovascular: Negative for chest pain  Gastrointestinal: Negative for abdominal pain, diarrhea and nausea  Endocrine: Negative for cold intolerance and heat intolerance  Musculoskeletal: Negative for arthralgias and myalgias  Skin: Negative for color change  Neurological: Negative for dizziness  Psychiatric/Behavioral: Negative for agitation, dysphoric mood and sleep disturbance         Past Medical History:   Diagnosis Date   • Abnormal mammogram of right breast 06/29/2017   • Actinic keratosis    • Acute medial meniscal tear, left, initial encounter 06/29/2017   • Arthritis    • Basal cell carcinoma    • Diabetes mellitus ([facility] [address] )    • Generalized anxiety disorder 08/31/2017   • GERD (gastroesophageal reflux disease)    • Herpes zoster 09/26/2016   • Hyperlipidemia    • Hypertension    • Hypokalemia    • Left medial knee pain    • Localized osteoarthritis of left knee 03/15/2016   • Post-traumatic headache 09/19/2017   • Restless leg syndrome    • Squamous cell skin cancer      Past Surgical History:   Procedure Laterality Date   • ANKLE FRACTURE SURGERY Right    • ARTHROSCOPY KNEE Left 5/26/2016    Procedure: ARTHROSCOPY KNEE, PARTILA MEDIAL MENISECTOMY;  Surgeon: Noemi Rangel MD;  Location: [facility];  Service:    • CHOLECYSTECTOMY     • HYSTERECTOMY      57   • OOPHORECTOMY      57   • SQUAMOUS CELL CARCINOMA EXCISION      forehead     Family History   Problem Relation Age of Onset   • Stroke Mother    • Stroke Father    • Cerebral aneurysm Sister    • Uterine cancer Sister 54   • No Known Problems Sister    • No Known Problems Daughter    • No Known Problems Maternal Grandmother    • No Known Problems Maternal Grandfather    • No Known Problems Paternal Grandmother    • No Known Problems Paternal Grandfather    • No Known Problems Maternal Aunt    • No Known Problems Maternal Aunt    • No Known Problems Maternal Aunt    • Hypertension Other    • Diabetes Other    • Breast cancer Neg Hx      Social History     Socioeconomic History   • Marital status: /Civil Union     Spouse name: None   • Number of children: None   • Years of education: None   • Highest education level: None   Occupational History   • None   Tobacco Use   • Smoking status: Never   • Smokeless tobacco: Never   Vaping Use   • Vaping Use: Never used   Substance and Sexual Activity   • Alcohol use: Not Currently     Alcohol/week: 0 0 standard drinks     Comment: rare   • Drug use: No   • Sexual activity: Not Currently     Partners: Male     Birth control/protection: Female Sterilization   Other Topics Concern   • None   Social History Narrative   • None     Social Determinants of Health     Financial Resource Strain: Low Risk    • Difficulty of Paying Living Expenses: Not hard at all   Food Insecurity: No Food Insecurity   • Worried About Running Out of Food in the Last Year: Never true   • Ran Out of Food in the Last Year: Never true   Transportation Needs: No Transportation Needs   • Lack of Transportation (Medical): No   • Lack of Transportation (Non-Medical): No   Physical Activity: Not on file   Stress: Not on file   Social Connections: Not on file   Intimate Partner Violence: Not At Risk   • Fear of Current or Ex-Partner: No   • Emotionally Abused: No   • Physically Abused: No   • Sexually Abused: No   Housing Stability: Low Risk    • Unable to Pay for Housing in the Last Year: No   • Number of Places Lived in the Last Year: 1   • Unstable Housing in the Last Year: No     Current Outpatient Medications on File Prior to Visit   Medication Sig   • aspirin 81 MG tablet Take 81 mg by mouth daily     • atorvastatin (LIPITOR) 10 mg tablet TAKE 1 TABLET BY MOUTH EVERY DAY   • betamethasone valerate (VALISONE) 0 1 % ointment Apply topically daily   • dorzolamide-timolol (COSOPT) 22 3-6 8 MG/ML ophthalmic solution INSTILL 1 DROP INTO BOTH EYES TWICE A DAY   • fluorouracil (EFUDEX) 5 % cream Apply 1 application topically as needed    • gabapentin (NEURONTIN) 400 mg capsule Take 1 capsule (400 mg total) by mouth daily at bedtime   • Klor-Con M10 10 MEQ tablet TAKE 1 TABLET (10 MEQ TOTAL) BY MOUTH 2 (TWO) TIMES A DAY FOR 90 DAYS   • losartan (COZAAR) 50 mg tablet TAKE 1 TABLET BY MOUTH EVERY DAY   • metFORMIN (GLUCOPHAGE-XR) 500 mg 24 hr tablet TAKE 1 TABLET BY MOUTH EVERY DAY WITH DINNER   • omeprazole (PriLOSEC) 10 mg delayed release capsule TAKE 1 CAPSULE BY MOUTH EVERY DAY   • triamcinolone (KENALOG) 0 5 % ointment Apply topically 2 (two) times a day for 14 days   • triamterene-hydrochlorothiazide (DYAZIDE) 37 5-25 mg per capsule TAKE 1 CAPSULE BY MOUTH EVERY DAY   • verapamil (CALAN) 40 mg tablet TAKE 1 TABLET BY MOUTH THREE TIMES A DAY   • [DISCONTINUED] nystatin (MYCOSTATIN) cream Apply topically 2 (two) times a day     Allergies   Allergen Reactions   • Penicillins Rash     itching     Immunization History   Administered Date(s) Administered   • COVID-19 PFIZER VACCINE 0 3 ML IM 01/21/2021, 02/10/2021, 10/16/2021   • INFLUENZA 09/26/2016, 11/03/2017, 09/21/2018   • Influenza Quadrivalent Preservative Free 3 years and older IM 10/20/2014   • Influenza Split High Dose Preservative Free IM 09/26/2016, 10/03/2019   • Influenza, high dose seasonal 0 7 mL 09/17/2020, 09/16/2021   • Influenza, seasonal, injectable 11/18/2013   • Influenza, seasonal, injectable, preservative free 10/01/2015   • Pneumococcal Conjugate 13-Valent 01/07/2016   • Pneumococcal Polysaccharide PPV23 02/04/2013   • Zoster 05/06/2014       Objective     /76 (BP Location: Left arm, Patient Position: Sitting, Cuff Size: Large)   Pulse 62   Temp 98 3 °F (36 8 °C) (Temporal)   Ht 5' 2" (1 575 m)   Wt 91 1 kg (200 lb 12 8 oz)   SpO2 94%   BMI 36 73 kg/m²     Physical Exam  Vitals reviewed     Constitutional:       Appearance: She is well-developed  HENT:      Head: Normocephalic and atraumatic  Cardiovascular:      Rate and Rhythm: Normal rate and regular rhythm  Heart sounds: Normal heart sounds  Pulmonary:      Effort: Pulmonary effort is normal       Breath sounds: Normal breath sounds  Abdominal:      General: Bowel sounds are normal       Palpations: Abdomen is soft  Musculoskeletal:         General: Normal range of motion  Skin:     General: Skin is warm and dry  Neurological:      Mental Status: She is alert and oriented to person, place, and time     Psychiatric:         Behavior: Behavior normal          Judgment: Judgment normal        Floydene Custard

## 2023-04-10 DIAGNOSIS — M54.81 BILATERAL OCCIPITAL NEURALGIA: ICD-10-CM

## 2023-04-10 DIAGNOSIS — K21.9 GASTROESOPHAGEAL REFLUX DISEASE, UNSPECIFIED WHETHER ESOPHAGITIS PRESENT: Primary | ICD-10-CM

## 2023-04-10 DIAGNOSIS — G44.85 STABBING HEADACHE: ICD-10-CM

## 2023-04-10 RX ORDER — VERAPAMIL HYDROCHLORIDE 40 MG/1
40 TABLET ORAL 2 TIMES DAILY
Qty: 180 TABLET | Refills: 1 | Status: SHIPPED | OUTPATIENT
Start: 2023-04-10

## 2023-05-25 DIAGNOSIS — E78.5 HYPERLIPIDEMIA, UNSPECIFIED HYPERLIPIDEMIA TYPE: ICD-10-CM

## 2023-05-25 DIAGNOSIS — I10 HYPERTENSION, UNSPECIFIED TYPE: ICD-10-CM

## 2023-05-25 DIAGNOSIS — N89.8 VAGINAL ITCHING: ICD-10-CM

## 2023-05-26 RX ORDER — TRIAMCINOLONE ACETONIDE 5 MG/G
OINTMENT TOPICAL
Qty: 15 G | Refills: 0 | Status: SHIPPED | OUTPATIENT
Start: 2023-05-26

## 2023-05-30 RX ORDER — LOSARTAN POTASSIUM 50 MG/1
TABLET ORAL
Qty: 90 TABLET | Refills: 0 | Status: SHIPPED | OUTPATIENT
Start: 2023-05-30

## 2023-05-30 RX ORDER — ATORVASTATIN CALCIUM 10 MG/1
TABLET, FILM COATED ORAL
Qty: 90 TABLET | Refills: 1 | Status: SHIPPED | OUTPATIENT
Start: 2023-05-30

## 2023-06-10 DIAGNOSIS — K21.9 GASTROESOPHAGEAL REFLUX DISEASE WITHOUT ESOPHAGITIS: ICD-10-CM

## 2023-06-12 RX ORDER — OMEPRAZOLE 10 MG/1
CAPSULE, DELAYED RELEASE ORAL
Qty: 90 CAPSULE | Refills: 1 | Status: SHIPPED | OUTPATIENT
Start: 2023-06-12

## 2023-06-12 NOTE — ASSESSMENT & PLAN NOTE
Impression: Nonexudative age-related macular degeneration, left eye, intermediate dry stage: H35.3122. Plan: Discussed diagnosis with patient. No smoking. Pt to take vitamins approved in the AREDS2 study & continue to monitor AG on a daily basis. If any changes w/ AG call and RTC ASAP. Reordered dxa

## 2023-06-22 ENCOUNTER — OFFICE VISIT (OUTPATIENT)
Dept: GASTROENTEROLOGY | Facility: AMBULARY SURGERY CENTER | Age: 78
End: 2023-06-22
Payer: MEDICARE

## 2023-06-22 VITALS
BODY MASS INDEX: 36.99 KG/M2 | SYSTOLIC BLOOD PRESSURE: 134 MMHG | DIASTOLIC BLOOD PRESSURE: 78 MMHG | WEIGHT: 201 LBS | HEART RATE: 66 BPM | OXYGEN SATURATION: 98 % | HEIGHT: 62 IN

## 2023-06-22 DIAGNOSIS — K63.5 POLYP OF COLON, UNSPECIFIED PART OF COLON, UNSPECIFIED TYPE: Primary | ICD-10-CM

## 2023-06-22 DIAGNOSIS — K21.9 GASTROESOPHAGEAL REFLUX DISEASE, UNSPECIFIED WHETHER ESOPHAGITIS PRESENT: ICD-10-CM

## 2023-06-22 PROCEDURE — 99204 OFFICE O/P NEW MOD 45 MIN: CPT | Performed by: INTERNAL MEDICINE

## 2023-06-22 NOTE — PROGRESS NOTES
"Consultation - [facility]  June Green 69 y o  female MRN: [id number]  Unit/Bed#:  Encounter: [id number]        Consults    ASSESSMENT/PLAN:       1  GERD with occasional feeling of \" thickness\" in the back of the throat-possibly secondary to reflux at nighttime  Currently on omeprazole 10 mg before lunchtime  No symptoms during the daytime   -Would recommend taking omeprazole 10 mg before dinner instead  -Follow GERD diet   -Stop the use of NSAIDs, trial acetaminophen instead   -Would recommend EGD to assess for Major's esophagus given chronic symptoms of GERD  for which patient has been on PPI long-term  -If there is no evidence of Major's esophagus, would recommend trial of H2 blocker instead given long-term side effect profile associated PPI use  2   Colon cancer [id number]  Patient underwent colonoscopy by Dr Jeniffer Shelton earlier this year  Noted to have polyps, was recommended to repeat at 5-year interval     3   Blood work is reviewed and is notable for normal CBC, glucose mildly elevated at 136 otherwise liver enzymes are normal     ______________________________________________________________________    Reason for Consult / Principal Problem: [unfilled]    HPI: June Green is a 69y o  year old female with history of GERD, colon polyps, basal cell CA, presents for initial evaluation of chronic GERD symptoms  Patient reports that she occasionally feels some discomfort in her chest at nighttime when laying down  She also reports a feeling of thick mucus in the back of her mouth especially at nighttime  Patient is on CPAP machine for history of sleep apnea  She denies any dysphagia per se, no hematemesis, coffee emesis or melena  Patient also reports that frequent use of NSAIDs for musculoskeletal pain  She thinks that she may have had an EGD over 10 years ago but does not recall the results  She was seen by her PCP and trialed coming off of PPI    " Patient reports that this resulted in worsening of the nighttime chest discomfort  She reports that she went back on PPI which resulted in resolution of her symptoms  She is again trying to come off of PPI and stop taking omeprazole few days ago and has not had any recurrent symptoms yet  Patient reports that she underwent colonoscopy in 2023 which was notable for colon polyps  She thinks that she may have had an EGD over 10 years ago  Review of Systems: The remainder of the review of systems was negative except for the pertinent positives noted in HPI       Historical Information   Past Medical History:   Diagnosis Date   • Abnormal mammogram of right breast 06/29/2017   • Actinic keratosis    • Acute medial meniscal tear, left, initial encounter 06/29/2017   • Arthritis    • Basal cell carcinoma    • Colon polyp    • Diabetes mellitus ([facility] [address] )    • Generalized anxiety disorder 08/31/2017   • GERD (gastroesophageal reflux disease)    • Herpes zoster 09/26/2016   • Hyperlipidemia    • Hypertension    • Hypokalemia    • Left medial knee pain    • Localized osteoarthritis of left knee 03/15/2016   • Post-traumatic headache 09/19/2017   • Restless leg syndrome    • Squamous cell skin cancer      Past Surgical History:   Procedure Laterality Date   • ANKLE FRACTURE SURGERY Right    • ARTHROSCOPY KNEE Left 05/26/2016    Procedure: ARTHROSCOPY KNEE, PARTILA MEDIAL MENISECTOMY;  Surgeon: Estefani Vera MD;  [facility];  Service:    • CHOLECYSTECTOMY     • COLONOSCOPY     • HYSTERECTOMY      57   • OOPHORECTOMY      57   • SQUAMOUS CELL CARCINOMA EXCISION      forehead   • UPPER GASTROINTESTINAL ENDOSCOPY       Social History   Social History     Substance and Sexual Activity   Alcohol Use Yes    Comment: rare     Social History     Substance and Sexual Activity   Drug Use No     Social History     Tobacco Use   Smoking Status Never   Smokeless Tobacco Never     Family History   Problem Relation Age of Onset "  • Stroke Mother    • Stroke Father    • Cerebral aneurysm Sister    • Uterine cancer Sister 54   • No Known Problems Sister    • No Known Problems Maternal Grandmother    • No Known Problems Maternal Grandfather    • No Known Problems Paternal Grandmother    • No Known Problems Paternal Grandfather    • Colon cancer Maternal Aunt    • No Known Problems Maternal Aunt    • No Known Problems Maternal Aunt    • No Known Problems Daughter    • Hypertension Other    • Diabetes Other    • Breast cancer Neg Hx        Meds/Allergies     (Not in a hospital admission)    Current Facility-Administered Medications   Medication Dose Route Frequency   • lidocaine (XYLOCAINE) 1 % injection 2 mL  2 mL Injection    • triamcinolone acetonide (KENALOG-40) 40 mg/mL injection 40 mg  40 mg Intra-articular        Allergies   Allergen Reactions   • Penicillins Rash     itching       Objective     Blood pressure 134/78, pulse 66, height 5' 2\" (1 575 m), weight 91 2 kg (201 lb), SpO2 98 %, not currently breastfeeding  [unfilled]    PHYSICAL EXAM     GEN: well nourished, well developed, no acute distress  HEENT: anicteric, MMM, no cervical or supraclavicular lymphadenopathy  CV: RRR, no m/r/g  CHEST: CTA b/l, no WRR  ABD: +BS, soft, NT/ND, no hepatosplenomegaly  EXT: no c/c/e  SKIN: no rashes,  NEURO: aaox3    Lab Results:   No visits with results within 1 Day(s) from this visit     Latest known visit with results is:   Appointment on 03/16/2023   Component Date Value   • WBC 03/16/2023 7 80    • RBC 03/16/2023 4 41    • Hemoglobin 03/16/2023 13 2    • Hematocrit 03/16/2023 41 6    • MCV 03/16/2023 94    • MCH 03/16/2023 29 9    • MCHC 03/16/2023 31 7    • RDW 03/16/2023 13 6    • MPV 03/16/2023 10 5    • Platelets 18/00/7480 319    • nRBC 03/16/2023 0    • Neutrophils Relative 03/16/2023 48    • Immat GRANS % 03/16/2023 1    • Lymphocytes Relative 03/16/2023 38    • Monocytes Relative 03/16/2023 9    • Eosinophils Relative 03/16/2023 3  " • Basophils Relative 03/16/2023 1    • Neutrophils Absolute 03/16/2023 3 77    • Immature Grans Absolute 03/16/2023 0 04    • Lymphocytes Absolute 03/16/2023 2 97    • Monocytes Absolute 03/16/2023 0 71    • Eosinophils Absolute 03/16/2023 0 24    • Basophils Absolute 03/16/2023 0 07    • Sodium 03/16/2023 141    • Potassium 03/16/2023 3 8    • Chloride 03/16/2023 101    • CO2 03/16/2023 32    • ANION GAP 03/16/2023 8    • BUN 03/16/2023 16    • Creatinine 03/16/2023 0 98    • Glucose, Fasting 03/16/2023 136 (H)    • Calcium 03/16/2023 9 8    • AST 03/16/2023 19    • ALT 03/16/2023 20    • Alkaline Phosphatase 03/16/2023 72    • Total Protein 03/16/2023 7 4    • Albumin 03/16/2023 4 3    • Total Bilirubin 03/16/2023 0 61    • eGFR 03/16/2023 55    • Hemoglobin A1C 03/16/2023 6 6 (H)    • EAG 03/16/2023 143    • Cholesterol 03/16/2023 138    • Triglycerides 03/16/2023 172 (H)    • HDL, Direct 03/16/2023 59    • LDL Calculated 03/16/2023 45      Imaging Studies: I have personally reviewed pertinent films in PACS                Answers for HPI/ROS submitted by the patient on 6/21/2023  anorexia: No  arthralgias: No  belching: No  constipation: No  diarrhea: No  dysuria: No  fever: No  flatus: No  frequency: No  headaches: No  hematochezia: No  hematuria: No  melena: No  myalgias: No  nausea:  No  weight loss: No  vomiting: No  Diagnostic workup: lower endoscopy

## 2023-06-22 NOTE — PATIENT INSTRUCTIONS
Scheduled date of EGD(as of today): 08/28/23  Physician performing EGD: dr cohn  [facility] of EGD:[facility]  Instructions reviewed with patient by: joel  Clearances:  richard

## 2023-08-14 ENCOUNTER — HOSPITAL ENCOUNTER (OUTPATIENT)
Dept: RADIOLOGY | Age: 78
Discharge: HOME | End: 2023-08-14
Payer: MEDICARE

## 2023-08-14 VITALS — WEIGHT: 201.06 LBS | BODY MASS INDEX: 37 KG/M2 | HEIGHT: 62 IN

## 2023-08-14 DIAGNOSIS — Z12.31 ENCOUNTER FOR SCREENING MAMMOGRAM FOR MALIGNANT NEOPLASM OF BREAST: ICD-10-CM

## 2023-08-14 PROCEDURE — 77067 SCR MAMMO BI INCL CAD: CPT

## 2023-08-14 PROCEDURE — 77063 BREAST TOMOSYNTHESIS BI: CPT

## 2023-08-19 ENCOUNTER — ANESTHESIA (OUTPATIENT)
Dept: ANESTHESIOLOGY | Facility: HOSPITAL | Age: 78
End: 2023-08-19

## 2023-08-19 ENCOUNTER — ANESTHESIA EVENT (OUTPATIENT)
Dept: ANESTHESIOLOGY | Facility: HOSPITAL | Age: 78
End: 2023-08-19

## 2023-08-26 DIAGNOSIS — I10 HYPERTENSION, UNSPECIFIED TYPE: ICD-10-CM

## 2023-08-28 ENCOUNTER — HOSPITAL ENCOUNTER (OUTPATIENT)
Dept: GASTROENTEROLOGY | Facility: AMBULARY SURGERY CENTER | Age: 78
Setting detail: OUTPATIENT SURGERY
Discharge: HOME | End: 2023-08-28
Attending: INTERNAL MEDICINE
Payer: MEDICARE

## 2023-08-28 ENCOUNTER — ANESTHESIA EVENT (OUTPATIENT)
Dept: GASTROENTEROLOGY | Facility: AMBULARY SURGERY CENTER | Age: 78
End: 2023-08-28

## 2023-08-28 ENCOUNTER — ANESTHESIA (OUTPATIENT)
Dept: GASTROENTEROLOGY | Facility: AMBULARY SURGERY CENTER | Age: 78
End: 2023-08-28

## 2023-08-28 VITALS
TEMPERATURE: 97.7 F | DIASTOLIC BLOOD PRESSURE: 61 MMHG | HEIGHT: 61 IN | RESPIRATION RATE: 19 BRPM | BODY MASS INDEX: 37.76 KG/M2 | SYSTOLIC BLOOD PRESSURE: 120 MMHG | WEIGHT: 200 LBS | HEART RATE: 73 BPM | OXYGEN SATURATION: 95 %

## 2023-08-28 DIAGNOSIS — K21.9 GASTROESOPHAGEAL REFLUX DISEASE, UNSPECIFIED WHETHER ESOPHAGITIS PRESENT: ICD-10-CM

## 2023-08-28 LAB — GLUCOSE SERPL-MCNC: 138 MG/DL (ref 65–140)

## 2023-08-28 PROCEDURE — 82948 REAGENT STRIP/BLOOD GLUCOSE: CPT

## 2023-08-28 PROCEDURE — 88305 TISSUE EXAM BY PATHOLOGIST: CPT | Performed by: PATHOLOGY

## 2023-08-28 RX ORDER — LIDOCAINE HYDROCHLORIDE 20 MG/ML
INJECTION, SOLUTION EPIDURAL; INFILTRATION; INTRACAUDAL; PERINEURAL AS NEEDED
Status: DISCONTINUED | OUTPATIENT
Start: 2023-08-28 | End: 2023-08-28

## 2023-08-28 RX ORDER — SODIUM CHLORIDE, SODIUM LACTATE, POTASSIUM CHLORIDE, CALCIUM CHLORIDE 600; 310; 30; 20 MG/100ML; MG/100ML; MG/100ML; MG/100ML
INJECTION, SOLUTION INTRAVENOUS CONTINUOUS PRN
Status: DISCONTINUED | OUTPATIENT
Start: 2023-08-28 | End: 2023-08-28

## 2023-08-28 RX ORDER — PROPOFOL 10 MG/ML
INJECTION, EMULSION INTRAVENOUS AS NEEDED
Status: DISCONTINUED | OUTPATIENT
Start: 2023-08-28 | End: 2023-08-28

## 2023-08-28 RX ORDER — GLYCOPYRROLATE 0.2 MG/ML
INJECTION INTRAMUSCULAR; INTRAVENOUS AS NEEDED
Status: DISCONTINUED | OUTPATIENT
Start: 2023-08-28 | End: 2023-08-28

## 2023-08-28 RX ADMIN — PROPOFOL 50 MG: 10 INJECTION, EMULSION INTRAVENOUS at 08:28

## 2023-08-28 RX ADMIN — PROPOFOL 150 MG: 10 INJECTION, EMULSION INTRAVENOUS at 08:27

## 2023-08-28 RX ADMIN — PROPOFOL 30 MG: 10 INJECTION, EMULSION INTRAVENOUS at 08:31

## 2023-08-28 RX ADMIN — GLYCOPYRROLATE 0.2 MG: 0.2 INJECTION INTRAMUSCULAR; INTRAVENOUS at 08:27

## 2023-08-28 RX ADMIN — PROPOFOL 50 MG: 10 INJECTION, EMULSION INTRAVENOUS at 08:29

## 2023-08-28 RX ADMIN — SODIUM CHLORIDE, SODIUM LACTATE, POTASSIUM CHLORIDE, AND CALCIUM CHLORIDE: .6; .31; .03; .02 INJECTION, SOLUTION INTRAVENOUS at 08:17

## 2023-08-28 RX ADMIN — LIDOCAINE HYDROCHLORIDE 100 MG: 20 INJECTION, SOLUTION EPIDURAL; INFILTRATION; INTRACAUDAL; PERINEURAL at 08:27

## 2023-08-28 NOTE — H&P
History and Physical - [facility]  Jah Silva 69 y.o. female MRN: [id number]    HPI: Jah Silva is a 69y.o. year old female who presents with GERD.        Review of Systems    Historical Information   Past Medical History:   Diagnosis Date   • Abnormal mammogram of right breast 06/29/2017   • Actinic keratosis    • Acute medial meniscal tear, left, initial encounter 06/29/2017   • Arthritis    • Basal cell carcinoma    • Colon polyp    • Diabetes mellitus ([address])    • Generalized anxiety disorder 08/31/2017   • GERD (gastroesophageal reflux disease)    • Herpes zoster 09/26/2016   • Hyperlipidemia    • Hypertension    • Hypokalemia    • Left medial knee pain    • Localized osteoarthritis of left knee 03/15/2016   • Post-traumatic headache 09/19/2017   • Restless leg syndrome    • Sleep apnea    • Squamous cell skin cancer      Past Surgical History:   Procedure Laterality Date   • ANKLE FRACTURE SURGERY Right    • ARTHROSCOPY KNEE Left 05/26/2016    Procedure: ARTHROSCOPY KNEE, PARTILA MEDIAL MENISECTOMY;  Surgeon: Dion Cantrell MD;  [facility];  Service:    • CHOLECYSTECTOMY     • COLONOSCOPY     • HYSTERECTOMY      57   • OOPHORECTOMY      57   • SQUAMOUS CELL CARCINOMA EXCISION      forehead   • UPPER GASTROINTESTINAL ENDOSCOPY       Social History   Social History     Substance and Sexual Activity   Alcohol Use Yes    Comment: rare     Social History     Substance and Sexual Activity   Drug Use No     Social History     Tobacco Use   Smoking Status Never   Smokeless Tobacco Never     Family History   Problem Relation Age of Onset   • Stroke Mother    • Stroke Father    • Cerebral aneurysm Sister    • Uterine cancer Sister 54   • No Known Problems Sister    • No Known Problems Maternal Grandmother    • No Known Problems Maternal Grandfather    • No Known Problems Paternal Grandmother    • No Known Problems Paternal Grandfather    • Colon cancer Maternal Aunt    • No Known Problems Maternal Aunt    • No Known Problems Maternal Aunt    • No Known Problems Daughter    • Hypertension Other    • Diabetes Other    • Breast cancer Neg Hx        Meds/Allergies     (Not in a hospital admission)      Allergies   Allergen Reactions   • Penicillins Rash     itching       Objective     BP (!) 175/79   Pulse 77   Temp (!) 97.1 °F (36.2 °C) (Temporal)   Resp 17   Ht 5' 1" (1.549 m)   Wt 90.7 kg (200 lb)   SpO2 96%   BMI 37.79 kg/m²       PHYSICAL EXAM    Gen: NAD  CV: RRR  CHEST: Clear  ABD: soft, NT/ND  EXT: no edema  Neuro: AAO      ASSESSMENT/PLAN:  This is a 69y.o. year old female here for GERD. PLAN:   Procedure: EGD.

## 2023-08-28 NOTE — ANESTHESIA PREPROCEDURE EVALUATION
Review of Systems/Medical History  Patient summary reviewed. History of anesthetic complications: remote PONV. Cardiovascular  EKG reviewed., Hypertension , No past MI , No angina , [facility],   Comment: hyperlipidemia,  Pulmonary  Sleep apnea CPAP,        GI/Hepatic    GERD well controlled,             Endo/Other    Comment: obesity    GYN       Hematology   Musculoskeletal    Arthritis     Neurology   Psychology             Physical Exam    Airway    Mallampati score: III  TM Distance: >3 FB  Neck ROM: full     Dental   No notable dental hx     Cardiovascular      Pulmonary      Other Findings        Anesthesia Plan  ASA Score- 3     Anesthesia Type- general with ASA Monitors. Additional Monitors:   Airway Plan:     Comment: LMA, possible ETT. Plan Factors-    EKG reviewed. Patient summary reviewed. Induction- intravenous. Postoperative Plan-     Informed Consent- Anesthetic plan and risks discussed with patient and spouse.

## 2023-08-29 RX ORDER — LOSARTAN POTASSIUM 50 MG/1
TABLET ORAL
Qty: 90 TABLET | Refills: 0 | Status: SHIPPED | OUTPATIENT
Start: 2023-08-29

## 2023-08-31 PROCEDURE — 88305 TISSUE EXAM BY PATHOLOGIST: CPT | Performed by: PATHOLOGY

## 2023-09-01 DIAGNOSIS — K29.70 GASTRITIS WITHOUT BLEEDING, UNSPECIFIED CHRONICITY, UNSPECIFIED GASTRITIS TYPE: Primary | ICD-10-CM

## 2023-09-01 RX ORDER — FAMOTIDINE 40 MG/1
40 TABLET, FILM COATED ORAL DAILY
Qty: 90 TABLET | Refills: 0 | Status: SHIPPED | OUTPATIENT
Start: 2023-09-01 | End: 2023-11-30

## 2023-09-08 DIAGNOSIS — I10 ESSENTIAL HYPERTENSION: ICD-10-CM

## 2023-09-08 RX ORDER — TRIAMTERENE AND HYDROCHLOROTHIAZIDE 37.5; 25 MG/1; MG/1
CAPSULE ORAL
Qty: 90 CAPSULE | Refills: 1 | Status: SHIPPED | OUTPATIENT
Start: 2023-09-08

## 2023-09-18 ENCOUNTER — TELEPHONE (OUTPATIENT)
Dept: FAMILY MEDICINE CLINIC | Facility: CLINIC | Age: 78
End: 2023-09-18

## 2023-09-18 NOTE — TELEPHONE ENCOUNTER
Patient dropped in the office to see if order was sent. would like to go back to [employer] to have do this morning.

## 2023-09-18 NOTE — TELEPHONE ENCOUNTER
Pt and her  called in regards of blood work that provider was supposed to have ordered for them for their next appt, but neither charts have any blood work orders. Please advise. Pt and her  coming to hopefully gain scripts.

## 2023-09-19 ENCOUNTER — APPOINTMENT (OUTPATIENT)
Dept: LAB | Facility: CLINIC | Age: 78
End: 2023-09-19
Payer: MEDICARE

## 2023-09-19 DIAGNOSIS — I10 BENIGN ESSENTIAL HYPERTENSION: ICD-10-CM

## 2023-09-19 DIAGNOSIS — E11.9 TYPE 2 DIABETES MELLITUS WITHOUT COMPLICATION, WITHOUT LONG-TERM CURRENT USE OF INSULIN (HCC): Primary | ICD-10-CM

## 2023-09-19 DIAGNOSIS — E11.9 TYPE 2 DIABETES MELLITUS WITHOUT COMPLICATION, WITHOUT LONG-TERM CURRENT USE OF INSULIN (HCC): ICD-10-CM

## 2023-09-19 LAB
ALBUMIN SERPL BCP-MCNC: 4.3 G/DL (ref 3.5–5)
ALP SERPL-CCNC: 69 U/L (ref 34–104)
ALT SERPL W P-5'-P-CCNC: 26 U/L (ref 7–52)
ANION GAP SERPL CALCULATED.3IONS-SCNC: 11 MMOL/L
AST SERPL W P-5'-P-CCNC: 23 U/L (ref 13–39)
BASOPHILS # BLD AUTO: 0.03 THOUSANDS/ÂΜL (ref 0–0.1)
BASOPHILS NFR BLD AUTO: 1 % (ref 0–1)
BILIRUB SERPL-MCNC: 0.67 MG/DL (ref 0.2–1)
BUN SERPL-MCNC: 15 MG/DL (ref 5–25)
CALCIUM SERPL-MCNC: 9.7 MG/DL (ref 8.4–10.2)
CHLORIDE SERPL-SCNC: 102 MMOL/L (ref 96–108)
CHOLEST SERPL-MCNC: 120 MG/DL
CO2 SERPL-SCNC: 24 MMOL/L (ref 21–32)
CREAT SERPL-MCNC: 1.02 MG/DL (ref 0.6–1.3)
EOSINOPHIL # BLD AUTO: 0.19 THOUSAND/ÂΜL (ref 0–0.61)
EOSINOPHIL NFR BLD AUTO: 3 % (ref 0–6)
ERYTHROCYTE [DISTWIDTH] IN BLOOD BY AUTOMATED COUNT: 14 % (ref 11.6–15.1)
EST. AVERAGE GLUCOSE BLD GHB EST-MCNC: 148 MG/DL
GFR SERPL CREATININE-BSD FRML MDRD: 53 ML/MIN/1.73SQ M
GLUCOSE P FAST SERPL-MCNC: 138 MG/DL (ref 65–99)
HBA1C MFR BLD: 6.8 %
HCT VFR BLD AUTO: 39.9 % (ref 34.8–46.1)
HDLC SERPL-MCNC: 47 MG/DL
HGB BLD-MCNC: 13.1 G/DL (ref 11.5–15.4)
IMM GRANULOCYTES # BLD AUTO: 0.02 THOUSAND/UL (ref 0–0.2)
IMM GRANULOCYTES NFR BLD AUTO: 0 % (ref 0–2)
LDLC SERPL CALC-MCNC: 41 MG/DL (ref 0–100)
LYMPHOCYTES # BLD AUTO: 2.07 THOUSANDS/ÂΜL (ref 0.6–4.47)
LYMPHOCYTES NFR BLD AUTO: 33 % (ref 14–44)
MCH RBC QN AUTO: 30.9 PG (ref 26.8–34.3)
MCHC RBC AUTO-ENTMCNC: 32.8 G/DL (ref 31.4–37.4)
MCV RBC AUTO: 94 FL (ref 82–98)
MONOCYTES # BLD AUTO: 0.61 THOUSAND/ÂΜL (ref 0.17–1.22)
MONOCYTES NFR BLD AUTO: 10 % (ref 4–12)
NEUTROPHILS # BLD AUTO: 3.33 THOUSANDS/ÂΜL (ref 1.85–7.62)
NEUTS SEG NFR BLD AUTO: 53 % (ref 43–75)
NRBC BLD AUTO-RTO: 0 /100 WBCS
PLATELET # BLD AUTO: 327 THOUSANDS/UL (ref 149–390)
PMV BLD AUTO: 9.9 FL (ref 8.9–12.7)
POTASSIUM SERPL-SCNC: 3.9 MMOL/L (ref 3.5–5.3)
PROT SERPL-MCNC: 7.4 G/DL (ref 6.4–8.4)
RBC # BLD AUTO: 4.24 MILLION/UL (ref 3.81–5.12)
SODIUM SERPL-SCNC: 137 MMOL/L (ref 135–147)
TRIGL SERPL-MCNC: 161 MG/DL
WBC # BLD AUTO: 6.25 THOUSAND/UL (ref 4.31–10.16)

## 2023-09-19 PROCEDURE — 80061 LIPID PANEL: CPT

## 2023-09-19 PROCEDURE — 36415 COLL VENOUS BLD VENIPUNCTURE: CPT

## 2023-09-19 PROCEDURE — 83036 HEMOGLOBIN GLYCOSYLATED A1C: CPT

## 2023-09-19 PROCEDURE — 85025 COMPLETE CBC W/AUTO DIFF WBC: CPT

## 2023-09-19 PROCEDURE — 80053 COMPREHEN METABOLIC PANEL: CPT

## 2023-09-25 ENCOUNTER — OFFICE VISIT (OUTPATIENT)
Dept: FAMILY MEDICINE CLINIC | Facility: CLINIC | Age: 78
End: 2023-09-25

## 2023-09-25 ENCOUNTER — OFFICE VISIT (OUTPATIENT)
Dept: SLEEP CENTER | Facility: CLINIC | Age: 78
End: 2023-09-25
Payer: MEDICARE

## 2023-09-25 VITALS
HEART RATE: 81 BPM | OXYGEN SATURATION: 96 % | DIASTOLIC BLOOD PRESSURE: 84 MMHG | HEIGHT: 61 IN | BODY MASS INDEX: 37.95 KG/M2 | SYSTOLIC BLOOD PRESSURE: 148 MMHG | WEIGHT: 201 LBS

## 2023-09-25 VITALS
OXYGEN SATURATION: 96 % | WEIGHT: 200 LBS | DIASTOLIC BLOOD PRESSURE: 79 MMHG | SYSTOLIC BLOOD PRESSURE: 125 MMHG | TEMPERATURE: 98.1 F | HEIGHT: 61 IN | BODY MASS INDEX: 37.76 KG/M2 | HEART RATE: 67 BPM

## 2023-09-25 DIAGNOSIS — M79.671 PAIN IN BOTH FEET: ICD-10-CM

## 2023-09-25 DIAGNOSIS — Z00.00 MEDICARE ANNUAL WELLNESS VISIT, SUBSEQUENT: Primary | ICD-10-CM

## 2023-09-25 DIAGNOSIS — R68.2 DRY MOUTH: ICD-10-CM

## 2023-09-25 DIAGNOSIS — G25.81 RESTLESS LEGS SYNDROME: ICD-10-CM

## 2023-09-25 DIAGNOSIS — E66.9 OBESITY (BMI 30-39.9): ICD-10-CM

## 2023-09-25 DIAGNOSIS — N95.9 UNSPECIFIED MENOPAUSAL AND PERIMENOPAUSAL DISORDER: ICD-10-CM

## 2023-09-25 DIAGNOSIS — K21.9 GASTROESOPHAGEAL REFLUX DISEASE, UNSPECIFIED WHETHER ESOPHAGITIS PRESENT: ICD-10-CM

## 2023-09-25 DIAGNOSIS — N95.1 MENOPAUSAL STATE: ICD-10-CM

## 2023-09-25 DIAGNOSIS — M65.331 TRIGGER MIDDLE FINGER OF RIGHT HAND: ICD-10-CM

## 2023-09-25 DIAGNOSIS — M79.672 PAIN IN BOTH FEET: ICD-10-CM

## 2023-09-25 DIAGNOSIS — N18.31 STAGE 3A CHRONIC KIDNEY DISEASE (HCC): ICD-10-CM

## 2023-09-25 DIAGNOSIS — R40.0 DAYTIME SLEEPINESS: ICD-10-CM

## 2023-09-25 DIAGNOSIS — I10 BENIGN ESSENTIAL HYPERTENSION: ICD-10-CM

## 2023-09-25 DIAGNOSIS — Z23 NEED FOR INFLUENZA VACCINATION: ICD-10-CM

## 2023-09-25 DIAGNOSIS — E11.9 TYPE 2 DIABETES MELLITUS WITHOUT COMPLICATION, WITHOUT LONG-TERM CURRENT USE OF INSULIN (HCC): ICD-10-CM

## 2023-09-25 DIAGNOSIS — G47.33 OBSTRUCTIVE SLEEP APNEA: Primary | ICD-10-CM

## 2023-09-25 DIAGNOSIS — Z13.820 OSTEOPOROSIS SCREENING: ICD-10-CM

## 2023-09-25 PROCEDURE — 99214 OFFICE O/P EST MOD 30 MIN: CPT | Performed by: INTERNAL MEDICINE

## 2023-09-25 PROCEDURE — 99214 OFFICE O/P EST MOD 30 MIN: CPT | Performed by: FAMILY MEDICINE

## 2023-09-25 PROCEDURE — G0439 PPPS, SUBSEQ VISIT: HCPCS | Performed by: FAMILY MEDICINE

## 2023-09-25 PROCEDURE — G0008 ADMIN INFLUENZA VIRUS VAC: HCPCS | Performed by: FAMILY MEDICINE

## 2023-09-25 PROCEDURE — 90662 IIV NO PRSV INCREASED AG IM: CPT | Performed by: FAMILY MEDICINE

## 2023-09-25 RX ORDER — GABAPENTIN 400 MG/1
400 CAPSULE ORAL
Qty: 90 CAPSULE | Refills: 3 | Status: SHIPPED | OUTPATIENT
Start: 2023-09-25

## 2023-09-25 NOTE — PROGRESS NOTES
Follow-Up Note - [address]  69 y.o. female  :1945  [facility]:[id number]  DOS:2023    CC: I saw this patient for follow-up in clinic today for Sleep disordered breathing, Coexisting Sleep and Medical Problems. Interval changes: Patient received ot a refurbished dream Station Version 1 machine from [employer] over a year ago. Patient had a split sleep study in 2019: The diagnostic portion demonstrated: AHI of 13 per hour,   and had no stage REM. Intermittent snoring of moderate intensity was noted. Minimum oxygen saturation was 88 %. During the therapeutic portion of the study, his sleep disordered breathing was adequately remediated with nasal CPAP at 10 cm H2O. Mean oxygen saturation was 93% with brief desaturations to a triston of 87%. There were mild periodic limb movements of sleep. PFSH, Problem List, Medications & Allergies were reviewed in EMR. She  has a past medical history of Abnormal mammogram of right breast (2017), Actinic keratosis, Acute medial meniscal tear, left, initial encounter (2017), Arthritis, Basal cell carcinoma, Colon polyp, Diabetes mellitus ([address]), Generalized anxiety disorder (2017), GERD (gastroesophageal reflux disease), Herpes zoster (2016), Hyperlipidemia, Hypertension, Hypokalemia, Left medial knee pain, Localized osteoarthritis of left knee (03/15/2016), Post-traumatic headache (2017), Restless leg syndrome, Sleep apnea, and Squamous cell skin cancer. She has a current medication list which includes the following prescription(s): aspirin, atorvastatin, betamethasone valerate, dorzolamide-timolol, famotidine, fluorouracil, klor-con m10, losartan, metformin, omeprazole, triamcinolone, triamterene-hydrochlorothiazide, verapamil, and gabapentin. PHYSIOLOGICAL DATA REVIEW : Using PAP > 4 hours/night 97%.  Estimated HERVE 0.5/hour with pressure of 11cm H2O ; patient has not been using non FDA approved devices to sanitize the machine. INTERPRETATION: Compliance is excellent; Pressure setting is:optimal; ;   SUBJECTIVE: With respect to use of PAP, Meredith Hooper  is experiencing significant adverse effects:dry mouth/throat. She derives benefit. Is satisfied with sleep and daytime function. Sleep Routine: Meredith Hooper reports getting 6 - 7 hrs sleep; she has no difficulty initiating, but reports diffculty maintaining sleep   " And started thinking and later doze off for another hour."  She arises spontaneously and usually feels refreshed. Meredith Hooper reports excessive daytime sleepiness, [takes planned naps.] She rated [herself] at Total score: 9 /24 on the [place] Sleepiness Scale. Other issues: Restless leg symptoms are controlled on gabapentin. Habits:[ reports that she has never smoked. She has never used smokeless tobacco.], [ reports current alcohol use.], [ reports no history of drug use.], Caffeine use:limited; Exercise routine: none. ROS: Significant for weight has been stable. She is reporting no nasal, respiratory or cardiac symptoms. She has ongoing acid reflux. EXAM: /84 (BP Location: Left arm, Patient Position: Sitting, Cuff Size: Standard)   Pulse 81   Ht 5' 1" (1.549 m)   Wt 91.2 kg (201 lb)   SpO2 96%   BMI 37.98 kg/m²     Wt Readings from Last 3 Encounters:   09/25/23 91.2 kg (201 lb)   09/25/23 90.7 kg (200 lb)   08/28/23 90.7 kg (200 lb)      Patient is well groomed; well appearing. CNS: Alert, orientated, speech clear & coherent  Psych: cooperative and in no distress. Mental state: Appears normal.  H&N: EOMI; NC/AT: No facial pressure marks, no rashes. Skin/Extrem: col & hydration normal; no edema  Resp: Respiratory effort is normal  Physical findings otherwise essentially unchanged from previous. IMPRESSION: Problem List Items & Comorbidities Addressed this Visit    1. Obstructive sleep apnea  PAP DME Resupply/Reorder      2.  Restless legs syndrome  gabapentin (NEURONTIN) 400 mg capsule 3. Daytime sleepiness        4. Dry mouth        5. Benign essential hypertension        6. Obesity (BMI 30-39.9)        7. Gastroesophageal reflux disease, unspecified whether esophagitis present            PLAN:  1. I reviewed results of prior studies and physiologic data with the patient. 2. I discussed treatment options with risks and benefits. 3. Treatment with  PAP is medically necessary and Ernandez Artist is agreable to continue use. 4. Care of equipment, methods to improve comfort using PAP and importance of compliance with therapy were discussed. 5. Pressure setting: continue 11 cmH2O.    6. Rx provided to replace supplies and Care coordinated with DME provider. 7. Continue gabapentin 400 mg nightly. 8. Strategies to improve dry mouth were discussed. Specifically, adequate treatment of nasal allergies, adjusting heat and humidity settings on PAP machine, using Biotene mouthwash &/or Xylimelt. 9. Discussed strategies for weight reduction. 10. Follow-up is advised in 1 year or sooner if needed to monitor progress, compliance and to adjust therapy. Thank you for allowing me to participate in the care of this patient. Sincerely,     Authenticated electronically on 53/55/53   Board Certified Specialist     Portions of the record may have been created with voice recognition software. Occasional wrong word or "sound a like" substitutions may have occurred due to the inherent limitations of voice recognition software. There may also be notations and random deletions of words or characters from malfunctioning software. Read the chart carefully and recognize, using context, where substitutions/deletions have occurred.

## 2023-09-25 NOTE — PROGRESS NOTES
Name: Lori Taylor      : 1945      MRN: [id number]  Encounter Provider: Alberto Portillo  Encounter Date: 2023   Encounter department: [facility]    Assessment & Plan     1. Medicare annual wellness visit, subsequent    2. Need for influenza vaccination  -     influenza vaccine, high-dose, PF 0.7 mL (FLUZONE HIGH-DOSE)    3. Gastroesophageal reflux disease, unspecified whether esophagitis present  Assessment & Plan:  Go back to omeprazole and once stable can consider alternating with famotidine but for now PPI      4. Pain in both feet  Assessment & Plan:  Likely plantar fasciitis based on description of pain. Recommended plantar fasciitis brace and follow up with podiatry as well. 5. Osteoporosis screening    6. Menopausal state  -     DXA bone density spine hip and pelvis; Future; Expected date: 2023    7. Trigger middle finger of right hand  Assessment & Plan:  May just be due to OA but it is limiting to patient so will send to handsurgery    Orders:  -     Ambulatory Referral to Hand Surgery; Future    8. Unspecified menopausal and perimenopausal disorder  -     DXA bone density spine hip and pelvis; Future; Expected date: 2023    9. Type 2 diabetes mellitus without complication, without long-term current use of insulin ([facility])  Assessment & Plan:    Lab Results   Component Value Date    HGBA1C 6.8 (H) 2023   well controlled on metformin despite increase in A1C from 6.6 - discussed diet/exercise      10. Stage 3a chronic kidney disease [facility])  Assessment & Plan:  Lab Results   Component Value Date    EGFR 53 2023    EGFR 55 2023    EGFR 56 2022    CREATININE 1.02 2023    CREATININE 0.98 2023    CREATININE 0.97 2022   at baseline. We discussed hydration techniques      BMI Counseling: Body mass index is 37.79 kg/m².  The BMI is above normal. Nutrition recommendations include encouraging healthy choices of fruits and vegetables. Exercise recommendations include moderate physical activity 150 minutes/week. Rationale for BMI follow-up plan is due to patient being overweight or obese. Depression Screening and Follow-up Plan: Patient was screened for depression during today's encounter. They screened negative with a PHQ-2 score of 0. Subjective      Did have EGD  - no specific symptoms  Switched to famotidine and had recurrance of symptoms to restart omeprazole    hasa a pain in right middle finger PIP. Used to have pain in the joint and sounds like trigger finger,     Also complains of sleep apnea - has appt with dr Debby Martinez has been taking gabapentin which has helped RLS    Complains of pain in the soles of feet worse when standing. Then improves with some walking. Worse on first step of the morning or after sitting with elevated feet in th evenings. Review of Systems    Current Outpatient Medications on File Prior to Visit   Medication Sig   • aspirin 81 MG tablet Take 81 mg by mouth daily.    • atorvastatin (LIPITOR) 10 mg tablet TAKE 1 TABLET BY MOUTH EVERY DAY   • betamethasone valerate (VALISONE) 0.1 % ointment Apply topically daily   • dorzolamide-timolol (COSOPT) 22.3-6.8 MG/ML ophthalmic solution INSTILL 1 DROP INTO BOTH EYES TWICE A DAY   • famotidine (PEPCID) 40 MG tablet Take 1 tablet (40 mg total) by mouth daily   • fluorouracil (EFUDEX) 5 % cream Apply 1 application topically as needed    • Klor-Con M10 10 MEQ tablet TAKE 1 TABLET (10 MEQ TOTAL) BY MOUTH 2 (TWO) TIMES A DAY FOR 90 DAYS   • losartan (COZAAR) 50 mg tablet TAKE 1 TABLET BY MOUTH EVERY DAY   • metFORMIN (GLUCOPHAGE-XR) 500 mg 24 hr tablet TAKE 1 TABLET BY MOUTH EVERY DAY WITH DINNER   • omeprazole (PriLOSEC) 10 mg delayed release capsule TAKE 1 CAPSULE BY MOUTH EVERY DAY   • triamcinolone (KENALOG) 0.5 % ointment APPLY TO AFFECTED AREA TWICE A DAY FOR 14 DAYS   • triamterene-hydrochlorothiazide (DYAZIDE) 37.5-25 mg per capsule TAKE 1 CAPSULE BY MOUTH EVERY DAY   • verapamil (CALAN) 40 mg tablet Take 1 tablet (40 mg total) by mouth 2 (two) times a day   • [DISCONTINUED] gabapentin (NEURONTIN) 400 mg capsule Take 1 capsule (400 mg total) by mouth daily at bedtime   • gabapentin (NEURONTIN) 400 mg capsule Take 1 capsule (400 mg total) by mouth daily at bedtime       Objective     /79 (BP Location: Left arm, Patient Position: Sitting, Cuff Size: Standard)   Pulse 67   Temp 98.1 °F (36.7 °C) (Temporal)   Ht 5' 1" (1.549 m)   Wt 90.7 kg (200 lb)   SpO2 96%   BMI 37.79 kg/m²     Physical Exam  Constitutional:       Appearance: She is well-developed. HENT:      Head: Normocephalic and atraumatic. Cardiovascular:      Rate and Rhythm: Normal rate and regular rhythm. Pulses: no weak pulses          Dorsalis pedis pulses are 2+ on the right side and 2+ on the left side. Posterior tibial pulses are 2+ on the right side and 2+ on the left side. Heart sounds: Normal heart sounds. Pulmonary:      Effort: Pulmonary effort is normal.      Breath sounds: Normal breath sounds. Abdominal:      General: Bowel sounds are normal.      Palpations: Abdomen is soft. Musculoskeletal:         General: Swelling and deformity present. Normal range of motion. Comments: Right third PIP   Feet:      Right foot:      Skin integrity: No ulcer, skin breakdown, erythema, warmth, callus or dry skin. Left foot:      Skin integrity: No ulcer, skin breakdown, erythema, warmth, callus or dry skin. Skin:     General: Skin is warm and dry. Neurological:      Mental Status: She is alert and oriented to person, place, and time.    Psychiatric:         Behavior: Behavior normal.         Judgment: Judgment normal.       Appointment on 09/19/2023   Component Date Value Ref Range Status   • WBC 09/19/2023 6.25  4.31 - 10.16 Thousand/uL Final   • RBC 09/19/2023 4.24  3.81 - 5.12 Million/uL Final   • Hemoglobin 09/19/2023 13.1 11.5 - 15.4 g/dL Final   • Hematocrit 09/19/2023 39.9  34.8 - 46.1 % Final   • MCV 09/19/2023 94  82 - 98 fL Final   • MCH 09/19/2023 30.9  26.8 - 34.3 pg Final   • MCHC 09/19/2023 32.8  31.4 - 37.4 g/dL Final   • RDW 09/19/2023 14.0  11.6 - 15.1 % Final   • MPV 09/19/2023 9.9  8.9 - 12.7 fL Final   • Platelets 47/23/6713 327  149 - 390 Thousands/uL Final   • nRBC 09/19/2023 0  /100 WBCs Final   • Neutrophils Relative 09/19/2023 53  43 - 75 % Final   • Immat GRANS % 09/19/2023 0  0 - 2 % Final   • Lymphocytes Relative 09/19/2023 33  14 - 44 % Final   • Monocytes Relative 09/19/2023 10  4 - 12 % Final   • Eosinophils Relative 09/19/2023 3  0 - 6 % Final   • Basophils Relative 09/19/2023 1  0 - 1 % Final   • Neutrophils Absolute 09/19/2023 3.33  1.85 - 7.62 Thousands/µL Final   • Immature Grans Absolute 09/19/2023 0.02  0.00 - 0.20 Thousand/uL Final   • Lymphocytes Absolute 09/19/2023 2.07  0.60 - 4.47 Thousands/µL Final   • Monocytes Absolute 09/19/2023 0.61  0.17 - 1.22 Thousand/µL Final   • Eosinophils Absolute 09/19/2023 0.19  0.00 - 0.61 Thousand/µL Final   • Basophils Absolute 09/19/2023 0.03  0.00 - 0.10 Thousands/µL Final   • Sodium 09/19/2023 137  135 - 147 mmol/L Final   • Potassium 09/19/2023 3.9  3.5 - 5.3 mmol/L Final   • Chloride 09/19/2023 102  96 - 108 mmol/L Final   • CO2 09/19/2023 24  21 - 32 mmol/L Final   • ANION GAP 09/19/2023 11  mmol/L Final   • BUN 09/19/2023 15  5 - 25 mg/dL Final   • Creatinine 09/19/2023 1.02  0.60 - 1.30 mg/dL Final    Standardized to IDMS reference method   • Glucose, Fasting 09/19/2023 138 (H)  65 - 99 mg/dL Final   • Calcium 09/19/2023 9.7  8.4 - 10.2 mg/dL Final   • AST 09/19/2023 23  13 - 39 U/L Final   • ALT 09/19/2023 26  7 - 52 U/L Final    Specimen collection should occur prior to Sulfasalazine administration due to the potential for falsely depressed results.     • Alkaline Phosphatase 09/19/2023 69  34 - 104 U/L Final   • Total Protein 09/19/2023 7.4  6.4 - 8.4 g/dL Final   • Albumin 09/19/2023 4.3  3.5 - 5.0 g/dL Final   • Total Bilirubin 09/19/2023 0.67  0.20 - 1.00 mg/dL Final    Use of this assay is not recommended for patients undergoing treatment with eltrombopag due to the potential for falsely elevated results. N-acetyl-p-benzoquinone imine (metabolite of Acetaminophen) will generate erroneously low results in samples for patients that have taken an overdose of Acetaminophen. • eGFR 09/19/2023 53  ml/min/1.73sq m Final   • Hemoglobin A1C 09/19/2023 6.8 (H)  Normal 4.0-5.6%; PreDiabetic 5.7-6.4%; Diabetic >=6.5%; Glycemic control for adults with diabetes <7.0% % Final   • EAG 09/19/2023 148  mg/dl Final   • Cholesterol 09/19/2023 120  See Comment mg/dL Final    Cholesterol:         Pediatric <18 Years        Desirable          <170 mg/dL      Borderline High    170-199 mg/dL      High               >=200 mg/dL        Adult >=18 Years            Desirable         <200 mg/dL      Borderline High   200-239 mg/dL      High              >239 mg/dL     • Triglycerides 09/19/2023 161 (H)  See Comment mg/dL Final    Triglyceride:     0-9Y            <75mg/dL     10Y-17Y         <90 mg/dL       >=18Y     Normal          <150 mg/dL     Borderline High 150-199 mg/dL     High            200-499 mg/dL        Very High       >499 mg/dL    Specimen collection should occur prior to Metamizole administration due to the potential for falsely depressed results. • HDL, Direct 09/19/2023 47 (L)  >=50 mg/dL Final   • LDL Calculated 09/19/2023 41  0 - 100 mg/dL Final    LDL Cholesterol:     Optimal           <100 mg/dl     Near Optimal      100-129 mg/dl     Above Optimal       Borderline High 130-159 mg/dl       High            160-189 mg/dl       Very High       >189 mg/dl         This screening LDL is a calculated result. It does not have the accuracy of the Direct Measured LDL in the monitoring of patients with hyperlipidemia and/or statin therapy.    Direct Measure LDL (VXX184) must be ordered separately in these patients. Hospital Outpatient Visit on 08/28/2023   Component Date Value Ref Range Status   • POC Glucose 08/28/2023 138  65 - 140 mg/dl Final   • Case Report 08/28/2023    Final                    Value:Surgical Pathology Report                         Case: [id number]                                   Authorizing Provider:  Jeannette Man MD       Collected:           08/28/2023 [id number]              Ordering Location:     [address] [place] [address]        Received:            08/28/2023 [id number] [facility]                                                    Pathologist:           Jesika Sweeney MD                                                            Specimens:   A) - Duodenum, Bx Duodenum                                                                          B) - Stomach, Bx gastric                                                                            C) - Polyp, Stomach/Small Intestine, Bx gastric polyp                                               D) - Esophagus, Bx esophagus                                                              • Final Diagnosis 08/28/2023    Final                    Value: This result contains rich text formatting which cannot be displayed here. • Additional Information 08/28/2023    Final                    Value: This result contains rich text formatting which cannot be displayed here. • Gross Description 08/28/2023    Final                    Value: This result contains rich text formatting which cannot be displayed here. • Clinical Information 08/28/2023    Final                    Value:GERD  · The middle third of the esophagus and lower third of the esophagus appeared normal. Performed random biopsy using biopsy forceps to rule out eosinophilic esophagitis. · Mild, patchy granular mucosa in the antrum and prepyloric region; performed cold forceps biopsy to rule out H. pylori.  Retroflexed view was unremarkable, pylorus was patent. · The duodenal bulb, 1st part of the duodenum and 2nd part of the duodenum appeared normal. Performed random biopsy using biopsy forceps to rule out celiac disease. · One flat fundic gland polyp measuring smaller than 5 mm in the fundus of the stomach; performed complete en bloc removal by cold forceps biopsy    R/o celiac   Patient's shoes and socks removed. Right Foot/Ankle   Right Foot Inspection  Skin Exam: skin normal and skin intact. No dry skin, no warmth, no callus, no erythema, no maceration, no abnormal color, no pre-ulcer, no ulcer and no callus. Toe Exam: ROM and strength within normal limits. Sensory   Monofilament testing: intact    Vascular  Capillary refills: < 3 seconds  The right DP pulse is 2+. The right PT pulse is 2+. Left Foot/Ankle  Left Foot Inspection  Skin Exam: skin normal and skin intact. No dry skin, no warmth, no erythema, no maceration, normal color, no pre-ulcer, no ulcer and no callus. Toe Exam: ROM and strength within normal limits. Sensory   Monofilament testing: intact    Vascular  Capillary refills: < 3 seconds  The left DP pulse is 2+. The left PT pulse is 2+.      Assign Risk Category  No deformity present  No loss of protective sensation  No weak pulses  Risk: 0        [facility] [place]

## 2023-09-25 NOTE — ASSESSMENT & PLAN NOTE
Lab Results   Component Value Date    HGBA1C 6.8 (H) 09/19/2023   well controlled on metformin despite increase in A1C from 6.6 - discussed diet/exercise

## 2023-09-25 NOTE — ASSESSMENT & PLAN NOTE
Lab Results   Component Value Date    EGFR 53 09/19/2023    EGFR 55 03/16/2023    EGFR 56 09/26/2022    CREATININE 1.02 09/19/2023    CREATININE 0.98 03/16/2023    CREATININE 0.97 09/26/2022   at baseline.  We discussed hydration techniques

## 2023-09-25 NOTE — PATIENT INSTRUCTIONS
Medicare Preventive Visit Patient Instructions  Thank you for completing your Welcome to Medicare Visit or Medicare Annual Wellness Visit today. Your next wellness visit will be due in one year (9/25/2024). The screening/preventive services that you may require over the next 5-10 years are detailed below. Some tests may not apply to you based off risk factors and/or age. Screening tests ordered at today's visit but not completed yet may show as past due. Also, please note that scanned in results may not display below. Preventive Screenings:  Service Recommendations Previous Testing/Comments   Colorectal Cancer Screening  * Colonoscopy    * Fecal Occult Blood Test (FOBT)/Fecal Immunochemical Test (FIT)  * Fecal DNA/Cologuard Test  * Flexible Sigmoidoscopy Age: 43-73 years old   Colonoscopy: every 10 years (may be performed more frequently if at higher risk)  OR  FOBT/FIT: every 1 year  OR  Cologuard: every 3 years  OR  Sigmoidoscopy: every 5 years  Screening may be recommended earlier than age 39 if at higher risk for colorectal cancer. Also, an individualized decision between you and your healthcare provider will decide whether screening between the ages of 77-80 would be appropriate. Colonoscopy: 02/06/2018  FOBT/FIT: Not on file  Cologuard: Not on file  Sigmoidoscopy: Not on file          Breast Cancer Screening Age: 36 years old  Frequency: every 1-2 years  Not required if history of left and right mastectomy Mammogram: 08/14/2023    Screening Current   Cervical Cancer Screening Between the ages of 21-29, pap smear recommended once every 3 years. Between the ages of 32-69, can perform pap smear with HPV co-testing every 5 years.    Recommendations may differ for women with a history of total hysterectomy, cervical cancer, or abnormal pap smears in past. Pap Smear: 02/19/2020    Screening Not Indicated   Hepatitis C Screening Once for adults born between 1945 and 1965  More frequently in patients at high risk for Hepatitis C Hep C Antibody: 08/10/2018    Screening Current   Diabetes Screening 1-2 times per year if you're at risk for diabetes or have pre-diabetes Fasting glucose: 138 mg/dL (9/19/2023)  A1C: 6.8 % (9/19/2023)  Screening Not Indicated  History Diabetes   Cholesterol Screening Once every 5 years if you don't have a lipid disorder. May order more often based on risk factors. Lipid panel: 09/19/2023    Screening Not Indicated  History Lipid Disorder     Other Preventive Screenings Covered by Medicare:  Abdominal Aortic Aneurysm (AAA) Screening: covered once if your at risk. You're considered to be at risk if you have a family history of AAA. Lung Cancer Screening: covers low dose CT scan once per year if you meet all of the following conditions: (1) Age 48-67; (2) No signs or symptoms of lung cancer; (3) Current smoker or have quit smoking within the last 15 years; (4) You have a tobacco smoking history of at least 20 pack years (packs per day multiplied by number of years you smoked); (5) You get a written order from a healthcare provider. Glaucoma Screening: covered annually if you're considered high risk: (1) You have diabetes OR (2) Family history of glaucoma OR (3)  aged 48 and older OR (3)  American aged 72 and older  Osteoporosis Screening: covered every 2 years if you meet one of the following conditions: (1) You're estrogen deficient and at risk for osteoporosis based off medical history and other findings; (2) Have a vertebral abnormality; (3) On glucocorticoid therapy for more than 3 months; (4) Have primary hyperparathyroidism; (5) On osteoporosis medications and need to assess response to drug therapy. Last bone density test (DXA Scan): Not on file. HIV Screening: covered annually if you're between the age of 14-79. Also covered annually if you are younger than 13 and older than 72 with risk factors for HIV infection.  For pregnant patients, it is covered up to 3 times per pregnancy. Immunizations:  Immunization Recommendations   Influenza Vaccine Annual influenza vaccination during flu season is recommended for all persons aged >= 6 months who do not have contraindications   Pneumococcal Vaccine   * Pneumococcal conjugate vaccine = PCV13 (Prevnar 13), PCV15 (Vaxneuvance), PCV20 (Prevnar 20)  * Pneumococcal polysaccharide vaccine = PPSV23 (Pneumovax) Adults 20-63 years old: 1-3 doses may be recommended based on certain risk factors  Adults 72 years old: 1-2 doses may be recommended based off what pneumonia vaccine you previously received   Hepatitis B Vaccine 3 dose series if at intermediate or high risk (ex: diabetes, end stage renal disease, liver disease)   Tetanus (Td) Vaccine - COST NOT COVERED BY MEDICARE PART B Following completion of primary series, a booster dose should be given every 10 years to maintain immunity against tetanus. Td may also be given as tetanus wound prophylaxis. Tdap Vaccine - COST NOT COVERED BY MEDICARE PART B Recommended at least once for all adults. For pregnant patients, recommended with each pregnancy. Shingles Vaccine (Shingrix) - COST NOT COVERED BY MEDICARE PART B  2 shot series recommended in those aged 48 and above     Health Maintenance Due:      Topic Date Due    DXA SCAN  Never done    Colorectal Cancer Screening  02/06/2023    Breast Cancer Screening: Mammogram  08/14/2024    Hepatitis C Screening  Completed     Immunizations Due:      Topic Date Due    COVID-19 Vaccine (6 - Pfizer series) 02/24/2023    Influenza Vaccine (1) 09/01/2023     Advance Directives   What are advance directives? Advance directives are legal documents that state your wishes and plans for medical care. These plans are made ahead of time in case you lose your ability to make decisions for yourself. Advance directives can apply to any medical decision, such as the treatments you want, and if you want to donate organs.    What are the types of advance directives? There are many types of advance directives, and each state has rules about how to use them. You may choose a combination of any of the following:  Living will: This is a written record of the treatment you want. You can also choose which treatments you do not want, which to limit, and which to stop at a certain time. This includes surgery, medicine, IV fluid, and tube feedings. Durable power of  for [facility]): This is a written record that states who you want to make healthcare choices for you when you are unable to make them for yourself. This person, called a proxy, is usually a family member or a friend. You may choose more than 1 proxy. Do not resuscitate (DNR) order:  A DNR order is used in case your heart stops beating or you stop breathing. It is a request not to have certain forms of treatment, such as CPR. A DNR order may be included in other types of advance directives. Medical directive: This covers the care that you want if you are in a coma, near death, or unable to make decisions for yourself. You can list the treatments you want for each condition. Treatment may include pain medicine, surgery, blood transfusions, dialysis, IV or tube feedings, and a ventilator (breathing machine). Values history: This document has questions about your views, beliefs, and how you feel and think about life. This information can help others choose the care that you would choose. Why are advance directives important? An advance directive helps you control your care. Although spoken wishes may be used, it is better to have your wishes written down. Spoken wishes can be misunderstood, or not followed. Treatments may be given even if you do not want them. An advance directive may make it easier for your family to make difficult choices about your care. Urinary Incontinence   Urinary incontinence (UI)  is when you lose control of your bladder.  UI develops because your bladder cannot store or empty urine properly. The 3 most common types of UI are stress incontinence, urge incontinence, or both. Medicines:   May be given to help strengthen your bladder control. Report any side effects of medication to your healthcare provider. Do pelvic muscle exercises often:  Your pelvic muscles help you stop urinating. Squeeze these muscles tight for 5 seconds, then relax for 5 seconds. Gradually work up to squeezing for 10 seconds. Do 3 sets of 15 repetitions a day, or as directed. This will help strengthen your pelvic muscles and improve bladder control. Train your bladder:  Go to the bathroom at set times, such as every 2 hours, even if you do not feel the urge to go. You can also try to hold your urine when you feel the urge to go. For example, hold your urine for 5 minutes when you feel the urge to go. As that becomes easier, hold your urine for 10 minutes. Self-care:   Keep a UI record. Write down how often you leak urine and how much you leak. Make a note of what you were doing when you leaked urine. Drink liquids as directed. You may need to limit the amount of liquid you drink to help control your urine leakage. Do not drink any liquid right before you go to bed. Limit or do not have drinks that contain caffeine or alcohol. Prevent constipation. Eat a variety of high-fiber foods. Good examples are high-fiber cereals, beans, vegetables, and whole-grain breads. Walking is the best way to trigger your intestines to have a bowel movement. Exercise regularly and maintain a healthy weight. Weight loss and exercise will decrease pressure on your bladder and help you control your leakage. Use a catheter as directed  to help empty your bladder. A catheter is a tiny, plastic tube that is put into your bladder to drain your urine. Go to behavior therapy as directed. Behavior therapy may be used to help you learn to control your urge to urinate.     Weight Management   Why it is important to manage your weight:  Being overweight increases your risk of health conditions such as heart disease, high blood pressure, type 2 diabetes, and certain types of cancer. It can also increase your risk for osteoarthritis, sleep apnea, and other respiratory problems. Aim for a slow, steady weight loss. Even a small amount of weight loss can lower your risk of health problems. How to lose weight safely:  A safe and healthy way to lose weight is to eat fewer calories and get regular exercise. You can lose up about 1 pound a week by decreasing the number of calories you eat by 500 calories each day. Healthy meal plan for weight management:  A healthy meal plan includes a variety of foods, contains fewer calories, and helps you stay healthy. A healthy meal plan includes the following:  Eat whole-grain foods more often. A healthy meal plan should contain fiber. Fiber is the part of grains, fruits, and vegetables that is not broken down by your body. Whole-grain foods are healthy and provide extra fiber in your diet. Some examples of whole-grain foods are whole-wheat breads and pastas, oatmeal, brown rice, and bulgur. Eat a variety of vegetables every day. Include dark, leafy greens such as spinach, kale, nancy greens, and mustard greens. Eat yellow and orange vegetables such as carrots, sweet potatoes, and winter squash. Eat a variety of fruits every day. Choose fresh or canned fruit (canned in its own juice or light syrup) instead of juice. Fruit juice has very little or no fiber. Eat low-fat dairy foods. Drink fat-free (skim) milk or 1% milk. Eat fat-free yogurt and low-fat cottage cheese. Try low-fat cheeses such as mozzarella and other reduced-fat cheeses. Choose meat and other protein foods that are low in fat. Choose beans or other legumes such as split peas or lentils. Choose fish, skinless poultry (chicken or turkey), or lean cuts of red meat (beef or pork).  Before you cook meat or poultry, cut off any visible fat. Use less fat and oil. Try baking foods instead of frying them. Add less fat, such as margarine, sour cream, regular salad dressing and mayonnaise to foods. Eat fewer high-fat foods. Some examples of high-fat foods include french fries, doughnuts, ice cream, and cakes. Eat fewer sweets. Limit foods and drinks that are high in sugar. This includes candy, cookies, regular soda, and sweetened drinks. Exercise:  Exercise at least 30 minutes per day on most days of the week. Some examples of exercise include walking, biking, dancing, and swimming. You can also fit in more physical activity by taking the stairs instead of the elevator or parking farther away from stores. Ask your healthcare provider about the best exercise plan for you. © Copyright [employer] 2018 Information is for End User's use only and may not be sold, redistributed or otherwise used for commercial purposes.  All illustrations and images included in CareNotes® are the copyrighted property of [employer] or [address] [place] [address]    Plantar fasciitis [facility]

## 2023-09-25 NOTE — PATIENT INSTRUCTIONS
Strategies to improve dry mouth were discussed. Specifically, adequate treatment of nasal allergies, adjusting heat and humidity settings on PAP machine, using Biotene mouthwash &/or Xylimelt. Nursing Support:  When: Monday through Friday 7A-5PM except holidays  Where: Our direct line is [phone]. If you are having a true emergency please call 911. In the event that the line is busy or it is after hours please leave a voice message and we will return your call. Please speak clearly, leaving your full name, birth date, best number to reach you and the reason for your call. Medication refills: We will need the name of the medication, the dosage, the ordering provider, whether you get a 30 or 90 day refill, and the pharmacy name and address. Medications will be ordered by the provider only. Nurses cannot call in prescriptions. Please allow 7 days for medication refills. Physician requested updates: If your provider requested that you call with an update after starting medication, please be ready to provide us the medication and dosage, what time you take your medication, the time you attempt to fall asleep, time you fall asleep, when you wake up, and what time you get out of bed. Sleep Study Results: We will contact you with sleep study results and/or next steps after the physician has reviewed your testing.

## 2023-09-25 NOTE — ASSESSMENT & PLAN NOTE
Likely plantar fasciitis based on description of pain. Recommended plantar fasciitis brace and follow up with podiatry as well.

## 2023-09-25 NOTE — PROGRESS NOTES
Assessment and Plan:     Problem List Items Addressed This Visit    None       Preventive health issues were discussed with patient, and age appropriate screening tests were ordered as noted in patient's After Visit Summary. Personalized health advice and appropriate referrals for health education or preventive services given if needed, as noted in patient's After Visit Summary.      History of Present Illness:     Patient presents for a Medicare Wellness Visit    HPI   Patient Care Team:  Ana Rodriguez as PCP - General (Family Medicine)  DO Moncho Bonilla MD Araceli Rodes, MD     Review of Systems:     Review of Systems     Problem List:     Patient Active Problem List   Diagnosis   • Left medial knee pain   • Benign essential hypertension   • Bilateral hearing loss   • Bilateral occipital neuralgia   • Breast nodule   • Neck pain   • Esophageal reflux   • Generalized anxiety disorder   • Hyperlipidemia   • Primary localized osteoarthritis of left knee   • Restless legs syndrome   • Type 2 diabetes mellitus without complication ([address])   • Healthcare maintenance   • Peripheral edema   • Colon polyps   • Diverticulosis   • Screening for osteoporosis   • JAYA and COPD overlap syndrome (HCC)   • Dry mouth   • Chronic fatigue   • Pain of both hip joints   • Left hip pain   • Morbid obesity ([address])   • Skin lesion of scalp   • Obstructive sleep apnea   • Chronic migraine without aura without status migrainosus, not intractable   • Diarrhea   • Dizziness   • Vaginal itching   • Stage 3a chronic kidney disease ([address])   • Encounter for screening mammogram for malignant neoplasm of breast      Past Medical and Surgical History:     Past Medical History:   Diagnosis Date   • Abnormal mammogram of right breast 06/29/2017   • Actinic keratosis    • Acute medial meniscal tear, left, initial encounter 06/29/2017   • Arthritis    • Basal cell carcinoma    • Colon polyp    • Diabetes mellitus ([address]) • Generalized anxiety disorder 08/31/2017   • GERD (gastroesophageal reflux disease)    • Herpes zoster 09/26/2016   • Hyperlipidemia    • Hypertension    • Hypokalemia    • Left medial knee pain    • Localized osteoarthritis of left knee 03/15/2016   • Post-traumatic headache 09/19/2017   • Restless leg syndrome    • Sleep apnea    • Squamous cell skin cancer      Past Surgical History:   Procedure Laterality Date   • ANKLE FRACTURE SURGERY Right    • ARTHROSCOPY KNEE Left 05/26/2016    Procedure: ARTHROSCOPY KNEE, PARTILA MEDIAL MENISECTOMY;  Surgeon: Ralf Steiner MD;  Location: [facility];  Service:    • CHOLECYSTECTOMY     • COLONOSCOPY     • HYSTERECTOMY      57   • OOPHORECTOMY      57   • SQUAMOUS CELL CARCINOMA EXCISION      forehead   • UPPER GASTROINTESTINAL ENDOSCOPY        Family History:     Family History   Problem Relation Age of Onset   • Stroke Mother    • Stroke Father    • Cerebral aneurysm Sister    • Uterine cancer Sister 54   • No Known Problems Sister    • No Known Problems Maternal Grandmother    • No Known Problems Maternal Grandfather    • No Known Problems Paternal Grandmother    • No Known Problems Paternal Grandfather    • Colon cancer Maternal Aunt    • No Known Problems Maternal Aunt    • No Known Problems Maternal Aunt    • No Known Problems Daughter    • Hypertension Other    • Diabetes Other    • Breast cancer Neg Hx       Social History:     Social History     Socioeconomic History   • Marital status: /Civil Union     Spouse name: Not on file   • Number of children: Not on file   • Years of education: Not on file   • Highest education level: Not on file   Occupational History   • Not on file   Tobacco Use   • Smoking status: Never   • Smokeless tobacco: Never   Vaping Use   • Vaping Use: Never used   Substance and Sexual Activity   • Alcohol use: Yes     Comment: rare   • Drug use: No   • Sexual activity: Not Currently     Partners: Male     Birth control/protection: Female Sterilization   Other Topics Concern   • Not on file   Social History Narrative   • Not on file     Social Determinants of Health     Financial Resource Strain: Low Risk  (9/18/2023)    Overall Financial Resource Strain (CARDIA)    • Difficulty of Paying Living Expenses: Not very hard   Food Insecurity: No Food Insecurity (9/18/2023)    Hunger Vital Sign    • Worried About Running Out of Food in the Last Year: Never true    • Ran Out of Food in the Last Year: Never true   Transportation Needs: No Transportation Needs (9/18/2023)    PRAPARE - Transportation    • Lack of Transportation (Medical): No    • Lack of Transportation (Non-Medical): No   Physical Activity: Not on file   Stress: Not on file   Social Connections: Not on file   Intimate Partner Violence: Not At Risk (9/22/2022)    Humiliation, Afraid, Rape, and Kick questionnaire    • Fear of Current or Ex-Partner: No    • Emotionally Abused: No    • Physically Abused: No    • Sexually Abused: No   Housing Stability: Low Risk  (9/22/2022)    Housing Stability Vital Sign    • Unable to Pay for Housing in the Last Year: No    • Number of Places Lived in the Last Year: 1    • Unstable Housing in the Last Year: No      Medications and Allergies:     Current Outpatient Medications   Medication Sig Dispense Refill   • aspirin 81 MG tablet Take 81 mg by mouth daily.      • atorvastatin (LIPITOR) 10 mg tablet TAKE 1 TABLET BY MOUTH EVERY DAY 90 tablet 1   • betamethasone valerate (VALISONE) 0.1 % ointment Apply topically daily 45 g 1   • dorzolamide-timolol (COSOPT) 22.3-6.8 MG/ML ophthalmic solution INSTILL 1 DROP INTO BOTH EYES TWICE A DAY     • famotidine (PEPCID) 40 MG tablet Take 1 tablet (40 mg total) by mouth daily 90 tablet 0   • fluorouracil (EFUDEX) 5 % cream Apply 1 application topically as needed   1   • gabapentin (NEURONTIN) 400 mg capsule Take 1 capsule (400 mg total) by mouth daily at bedtime 90 capsule 3   • Klor-Con M10 10 MEQ tablet TAKE 1 TABLET (10 MEQ TOTAL) BY MOUTH 2 (TWO) TIMES A DAY FOR 90 DAYS 180 tablet 1   • losartan (COZAAR) 50 mg tablet TAKE 1 TABLET BY MOUTH EVERY DAY 90 tablet 0   • metFORMIN (GLUCOPHAGE-XR) 500 mg 24 hr tablet TAKE 1 TABLET BY MOUTH EVERY DAY WITH DINNER 90 tablet 1   • omeprazole (PriLOSEC) 10 mg delayed release capsule TAKE 1 CAPSULE BY MOUTH EVERY DAY 90 capsule 1   • triamcinolone (KENALOG) 0.5 % ointment APPLY TO AFFECTED AREA TWICE A DAY FOR 14 DAYS 15 g 0   • triamterene-hydrochlorothiazide (DYAZIDE) 37.5-25 mg per capsule TAKE 1 CAPSULE BY MOUTH EVERY DAY 90 capsule 1   • verapamil (CALAN) 40 mg tablet Take 1 tablet (40 mg total) by mouth 2 (two) times a day 180 tablet 1     No current facility-administered medications for this visit.      Allergies   Allergen Reactions   • Penicillins Rash     itching      Immunizations:     Immunization History   Administered Date(s) Administered   • COVID-19 PFIZER VACCINE 0.3 ML IM 01/21/2021, 02/10/2021, 10/16/2021   • COVID-19 Pfizer Vac BIVALENT Hermilo-sucrose 12 Yr+ IM 10/24/2022   • COVID-19 Pfizer vac (Hermilo-sucrose, gray cap) 12 yr+ IM 05/24/2022   • INFLUENZA 09/26/2016, 11/03/2017, 09/21/2018   • Influenza Quadrivalent Preservative Free 3 years and older IM 10/20/2014   • Influenza Split High Dose Preservative Free IM 09/26/2016, 10/03/2019   • Influenza, high dose seasonal 0.7 mL 09/17/2020, 09/16/2021   • Influenza, seasonal, injectable 11/18/2013   • Influenza, seasonal, injectable, preservative free 10/01/2015   • Pneumococcal Conjugate 13-Valent 01/07/2016   • Pneumococcal Polysaccharide PPV23 02/04/2013   • Zoster 05/06/2014   • Zoster Vaccine Recombinant 04/24/2023, 07/17/2023      Health Maintenance:         Topic Date Due   • DXA SCAN  Never done   • Colorectal Cancer Screening  02/06/2023   • Breast Cancer Screening: Mammogram  08/14/2024   • Hepatitis C Screening  Completed         Topic Date Due   • COVID-19 Vaccine (6 - Pfizer series) 02/24/2023   • Influenza Vaccine (1) 09/01/2023      Medicare Screening Tests and Risk Assessments:     Ana Paula Billings is here for her Subsequent Wellness visit. Last Medicare Wellness visit information reviewed, patient interviewed and updates made to the record today. Health Risk Assessment:   Patient rates overall health as good. Patient feels that their physical health rating is same. Patient is very satisfied with their life. Eyesight was rated as same. Hearing was rated as same. Patient feels that their emotional and mental health rating is same. Patients states they are never, rarely angry. Patient states they are often unusually tired/fatigued. Pain experienced in the last 7 days has been some. Patient's pain rating has been 7/10. Patient states that she has experienced no weight loss or gain in last 6 months. Depression Screening:   PHQ-2 Score: 0      Fall Risk Screening: In the past year, patient has experienced: no history of falling in past year      Urinary Incontinence Screening:   Patient has leaked urine accidently in the last six months. Home Safety:  Patient has trouble with stairs inside or outside of their home. Patient has working smoke alarms and has working carbon monoxide detector. Home safety hazards include: none. Nutrition:   Current diet is Regular. Medications:   Patient is currently taking over-the-counter supplements. OTC medications include: Low dose aspirin. Patient is able to manage medications. Activities of Daily Living (ADLs)/Instrumental Activities of Daily Living (IADLs):   Walk and transfer into and out of bed and chair?: Yes  Dress and groom yourself?: Yes    Bathe or shower yourself?: Yes    Feed yourself?  Yes  Do your laundry/housekeeping?: Yes  Manage your money, pay your bills and track your expenses?: Yes  Make your own meals?: Yes    Do your own shopping?: Yes    Durable Medical Equipment Suppliers  [facility]    Previous Hospitalizations:   Any hospitalizations or ED visits within the last 12 months?: No      Advance Care Planning:   Living will: Yes    Durable POA for healthcare: Yes    Advanced directive: Yes      Cognitive Screening:   Provider or family/friend/caregiver concerned regarding cognition?: No    PREVENTIVE SCREENINGS      Cardiovascular Screening:    General: Screening Not Indicated and History Lipid Disorder      Diabetes Screening:     General: Screening Not Indicated and History Diabetes      Breast Cancer Screening:     General: Screening Current      Cervical Cancer Screening:    General: Screening Not Indicated      Lung Cancer Screening:     General: Screening Not Indicated      Hepatitis C Screening:    General: Screening Current    Screening, Brief Intervention, and Referral to Treatment (SBIRT)    Screening  Typical number of drinks in a day: 0  Typical number of drinks in a week: 0  Interpretation: Low risk drinking behavior. AUDIT-C Screenin) How often did you have a drink containing alcohol in the past year? 2 to 4 times a month  2) How many drinks did you have on a typical day when you were drinking in the past year? 1 to 2  3) How often did you have 6 or more drinks on one occasion in the past year? never    AUDIT-C Score: 2  Interpretation: Score 0-2 (female): Negative screen for alcohol misuse    Single Item Drug Screening:  How often have you used an illegal drug (including marijuana) or a prescription medication for non-medical reasons in the past year? never    Single Item Drug Screen Score: 0  Interpretation: Negative screen for possible drug use disorder    No results found. Physical Exam:     There were no vitals taken for this visit.     Physical Exam     Brandy Dennis

## 2023-09-26 ENCOUNTER — TELEPHONE (OUTPATIENT)
Dept: ADMINISTRATIVE | Facility: OTHER | Age: 78
End: 2023-09-26

## 2023-09-26 ENCOUNTER — TELEPHONE (OUTPATIENT)
Dept: SLEEP CENTER | Facility: CLINIC | Age: 78
End: 2023-09-26

## 2023-09-26 NOTE — LETTER
Procedure Request Form: Colonoscopy      Date Requested: 23  Patient: Yifan Baron  Patient : 1945   Referring Provider: Gabi Wakefield        Date of Procedure ______________________________       The above patient has informed us that they have completed their   most recent Colonoscopy at your facility. Please complete   this form and attach all corresponding procedure reports/results. Comments ____ [place] [facility] 2023 ________________________________________  ____________________________________________________________________  ____________________________________________________________________  ____________________________________________________________________    Facility Completing Procedure _________________________________________    Form Completed By (print name) _______________________________________      Signature __________________________________________________________      These reports are needed for  compliance. Please fax this completed form and a copy of the procedure report to our office located at [address] [place] [address] as soon as possible to Fax [phone] attention Tari Stephens: Phone [phone]    We thank you for your assistance in treating our mutual patient.

## 2023-09-26 NOTE — TELEPHONE ENCOUNTER
Upon review of the In Basket request and the patient's chart, initial outreach has been made via fax to facility. Please see Contacts section for details.      Thank you  Teresa Ash MA

## 2023-09-26 NOTE — TELEPHONE ENCOUNTER
Upon review of the In Basket request we were able to locate, review, and update the patient chart as requested for CRC: Colonoscopy. Any additional questions or concerns should be emailed to the Practice Liaisons via the appropriate education email address, please do not reply via In Basket.     Thank you  Jared Blancas MA

## 2023-09-26 NOTE — TELEPHONE ENCOUNTER
Rx for PAP resupply sent to [address] [place] [address] via [address] [facility].

## 2023-09-26 NOTE — TELEPHONE ENCOUNTER
----- Message from Christiano Csoby LPN sent at 6/98/4157  4:35 PM EDT -----    ----- Message -----  From: Eduardo Dawkins  Sent: 9/25/2023   4:15 PM EDT  To: [facility]    This patient has a colonoscopy documented in media from feb of this year - can we update her aditi maintenance please

## 2023-09-28 LAB

## 2023-10-11 DIAGNOSIS — E11.9 TYPE 2 DIABETES MELLITUS WITHOUT COMPLICATION, WITHOUT LONG-TERM CURRENT USE OF INSULIN (HCC): ICD-10-CM

## 2023-10-11 DIAGNOSIS — G44.85 STABBING HEADACHE: ICD-10-CM

## 2023-10-11 DIAGNOSIS — I10 BENIGN ESSENTIAL HYPERTENSION: ICD-10-CM

## 2023-10-11 DIAGNOSIS — M54.81 BILATERAL OCCIPITAL NEURALGIA: ICD-10-CM

## 2023-10-11 RX ORDER — POTASSIUM CHLORIDE 750 MG/1
TABLET, EXTENDED RELEASE ORAL
Qty: 180 TABLET | Refills: 1 | Status: SHIPPED | OUTPATIENT
Start: 2023-10-11

## 2023-10-11 RX ORDER — METFORMIN HYDROCHLORIDE 500 MG/1
TABLET, EXTENDED RELEASE ORAL
Qty: 90 TABLET | Refills: 1 | Status: SHIPPED | OUTPATIENT
Start: 2023-10-11

## 2023-10-11 RX ORDER — VERAPAMIL HYDROCHLORIDE 40 MG/1
40 TABLET ORAL 2 TIMES DAILY
Qty: 180 TABLET | Refills: 1 | Status: SHIPPED | OUTPATIENT
Start: 2023-10-11

## 2023-10-16 ENCOUNTER — OFFICE VISIT (OUTPATIENT)
Dept: OBGYN CLINIC | Facility: CLINIC | Age: 78
End: 2023-10-16
Payer: MEDICARE

## 2023-10-16 VITALS
DIASTOLIC BLOOD PRESSURE: 82 MMHG | HEART RATE: 57 BPM | BODY MASS INDEX: 37.95 KG/M2 | SYSTOLIC BLOOD PRESSURE: 150 MMHG | WEIGHT: 201 LBS | HEIGHT: 61 IN

## 2023-10-16 DIAGNOSIS — M65.331 TRIGGER MIDDLE FINGER OF RIGHT HAND: Primary | ICD-10-CM

## 2023-10-16 DIAGNOSIS — M19.041 PRIMARY OSTEOARTHRITIS OF RIGHT HAND: ICD-10-CM

## 2023-10-16 DIAGNOSIS — M72.0 DUPUYTREN CONTRACTURE: ICD-10-CM

## 2023-10-16 PROCEDURE — 99204 OFFICE O/P NEW MOD 45 MIN: CPT | Performed by: ORTHOPAEDIC SURGERY

## 2023-10-16 PROCEDURE — 20550 NJX 1 TENDON SHEATH/LIGAMENT: CPT | Performed by: ORTHOPAEDIC SURGERY

## 2023-10-16 RX ORDER — LIDOCAINE HYDROCHLORIDE 10 MG/ML
0.5 INJECTION, SOLUTION INFILTRATION; PERINEURAL
Status: COMPLETED | OUTPATIENT
Start: 2023-10-16 | End: 2023-10-16

## 2023-10-16 RX ORDER — BETAMETHASONE SODIUM PHOSPHATE AND BETAMETHASONE ACETATE 3; 3 MG/ML; MG/ML
3 INJECTION, SUSPENSION INTRA-ARTICULAR; INTRALESIONAL; INTRAMUSCULAR; SOFT TISSUE
Status: COMPLETED | OUTPATIENT
Start: 2023-10-16 | End: 2023-10-16

## 2023-10-16 RX ADMIN — BETAMETHASONE SODIUM PHOSPHATE AND BETAMETHASONE ACETATE 3 MG: 3; 3 INJECTION, SUSPENSION INTRA-ARTICULAR; INTRALESIONAL; INTRAMUSCULAR; SOFT TISSUE at 12:30

## 2023-10-16 RX ADMIN — LIDOCAINE HYDROCHLORIDE 0.5 ML: 10 INJECTION, SOLUTION INFILTRATION; PERINEURAL at 12:30

## 2023-10-16 NOTE — PROGRESS NOTES
Assessment:  1. Trigger middle finger of right hand  Ambulatory Referral to Hand Surgery    Hand/upper extremity injection: R long A1      2. Dupuytren contracture        3. Primary osteoarthritis of right hand          Patient Active Problem List   Diagnosis    Left medial knee pain    Benign essential hypertension    Bilateral hearing loss    Bilateral occipital neuralgia    Breast nodule    Neck pain    Esophageal reflux    Generalized anxiety disorder    Hyperlipidemia    Primary localized osteoarthritis of left knee    Restless legs syndrome    Type 2 diabetes mellitus without complication ([address])    Healthcare maintenance    Peripheral edema    Colon polyps    Diverticulosis    Screening for osteoporosis    JAYA and COPD overlap syndrome (HCC)    Dry mouth    Chronic fatigue    Pain of both hip joints    Left hip pain    Morbid obesity (HCC)    Skin lesion of scalp    Obstructive sleep apnea    Chronic migraine without aura without status migrainosus, not intractable    Diarrhea    Dizziness    Vaginal itching    Stage 3a chronic kidney disease ([address])    Encounter for screening mammogram for malignant neoplasm of breast    Pain in both feet    Trigger middle finger of right hand           Plan      69 y.o. female with right long finger trigger finger  Diagnostics reviewed and physical exam performed. Diagnosis, treatment options and associated risks were discussed with the patient including no treatment, nonsurgical treatment and potential for surgical intervention. The patient was given the opportunity to ask questions regarding each. On examination there is a small cyst along the long finger tendon which would need to be further evaluated on US before trigger finger release   Patient also has signs of dupuytren's bilaterally, can refer to hand if progresses   A right long finger trigger finger CSI was offered, accepted, and administered in clinic today.  Procedure tolerated well, post injection protocol advised. Subjective:     Patient ID:    Chief Opal Petty 69 y.o. female      HPI    Patient presents to the office for initial evaluation of right long finger. RHD. Patient started noticing pain and triggering of her right long finger a few months ago. No mechanism of injury or trauma. Triggering has worsened recently, finger will lock in a flexed position and be very painful to move again. Denies any numbness or paresthesias. The following portions of the patient's history were reviewed and updated as appropriate: allergies, current medications, past family history, past social history, past surgical history and problem list.        Social History     Socioeconomic History    Marital status: /Civil Union     Spouse name: Not on file    Number of children: Not on file    Years of education: Not on file    Highest education level: Not on file   Occupational History    Not on file   Tobacco Use    Smoking status: Never    Smokeless tobacco: Never   Vaping Use    Vaping Use: Never used   Substance and Sexual Activity    Alcohol use: Yes     Comment: rare    Drug use: No    Sexual activity: Not Currently     Partners: Male     Birth control/protection: Female Sterilization   Other Topics Concern    Not on file   Social History Narrative    Not on file     Social Determinants of Health     Financial Resource Strain: Low Risk  (9/18/2023)    Overall Financial Resource Strain (CARDIA)     Difficulty of Paying Living Expenses: Not very hard   Food Insecurity: No Food Insecurity (9/18/2023)    Hunger Vital Sign     Worried About Running Out of Food in the Last Year: Never true     [address] in the Last Year: Never true   Transportation Needs: No Transportation Needs (9/18/2023)    PRAPARE - Transportation     Lack of Transportation (Medical): No     Lack of Transportation (Non-Medical):  No   Physical Activity: Inactive (9/25/2023)    Exercise Vital Sign     Days of Exercise per Week: 0 days     Minutes of Exercise per Session: 0 min   Stress: No Stress Concern Present (9/25/2023)    [facility]     Feeling of Stress : Not at all   Social Connections: Not on file   Intimate Partner Violence: Not At Risk (9/25/2023)    Humiliation, Afraid, Rape, and Kick questionnaire     Fear of Current or Ex-Partner: No     Emotionally Abused: No     Physically Abused: No     Sexually Abused: No   Housing Stability: Low Risk  (9/25/2023)    Housing Stability Vital Sign     Unable to Pay for Housing in the Last Year: No     Number of Places Lived in the Last Year: 1     Unstable Housing in the Last Year: No     Past Medical History:   Diagnosis Date    Abnormal mammogram of right breast 06/29/2017    Actinic keratosis     Acute medial meniscal tear, left, initial encounter 06/29/2017    Arthritis     Basal cell carcinoma     Colon polyp     Diabetes mellitus ([address])     Generalized anxiety disorder 08/31/2017    GERD (gastroesophageal reflux disease)     Herpes zoster 09/26/2016    Hyperlipidemia     Hypertension     Hypokalemia     Left medial knee pain     Localized osteoarthritis of left knee 03/15/2016    Post-traumatic headache 09/19/2017    Restless leg syndrome     Sleep apnea     Squamous cell skin cancer      Past Surgical History:   Procedure Laterality Date    ANKLE FRACTURE SURGERY Right     ARTHROSCOPY KNEE Left 05/26/2016    Procedure: ARTHROSCOPY KNEE, PARTILA MEDIAL MENISECTOMY;  Surgeon: Anthony Lyles MD;  Location: [facility];  Service:     CHOLECYSTECTOMY      COLONOSCOPY      HYSTERECTOMY      57    OOPHORECTOMY      57    SQUAMOUS CELL CARCINOMA EXCISION      forehead    UPPER GASTROINTESTINAL ENDOSCOPY       Allergies   Allergen Reactions    Penicillins Rash     itching     Current Outpatient Medications on File Prior to Visit   Medication Sig Dispense Refill    aspirin 81 MG tablet Take 81 mg by mouth daily. atorvastatin (LIPITOR) 10 mg tablet TAKE 1 TABLET BY MOUTH EVERY DAY 90 tablet 1    betamethasone valerate (VALISONE) 0.1 % ointment Apply topically daily 45 g 1    dorzolamide-timolol (COSOPT) 22.3-6.8 MG/ML ophthalmic solution INSTILL 1 DROP INTO BOTH EYES TWICE A DAY      famotidine (PEPCID) 40 MG tablet Take 1 tablet (40 mg total) by mouth daily 90 tablet 0    fluorouracil (EFUDEX) 5 % cream Apply 1 application topically as needed   1    gabapentin (NEURONTIN) 400 mg capsule Take 1 capsule (400 mg total) by mouth daily at bedtime 90 capsule 3    Klor-Con M10 10 MEQ tablet TAKE 1 TABLET (10 MEQ TOTAL) BY MOUTH 2 (TWO) TIMES A DAY FOR 90 DAYS 180 tablet 1    losartan (COZAAR) 50 mg tablet TAKE 1 TABLET BY MOUTH EVERY DAY 90 tablet 0    metFORMIN (GLUCOPHAGE-XR) 500 mg 24 hr tablet TAKE 1 TABLET BY MOUTH EVERY DAY WITH DINNER 90 tablet 1    omeprazole (PriLOSEC) 10 mg delayed release capsule TAKE 1 CAPSULE BY MOUTH EVERY DAY 90 capsule 1    triamcinolone (KENALOG) 0.5 % ointment APPLY TO AFFECTED AREA TWICE A DAY FOR 14 DAYS 15 g 0    triamterene-hydrochlorothiazide (DYAZIDE) 37.5-25 mg per capsule TAKE 1 CAPSULE BY MOUTH EVERY DAY 90 capsule 1    verapamil (CALAN) 40 mg tablet TAKE 1 TABLET BY MOUTH TWICE A  tablet 1     No current facility-administered medications on file prior to visit. Objective:    Review of Systems   Constitutional:  Negative for chills and fever. HENT:  Negative for ear pain and sore throat. Eyes:  Negative for pain and visual disturbance. Respiratory:  Negative for cough and shortness of breath. Cardiovascular:  Negative for chest pain and palpitations. Gastrointestinal:  Negative for abdominal pain and vomiting. Genitourinary:  Negative for dysuria and hematuria. Musculoskeletal:  Negative for arthralgias and back pain. Skin:  Negative for color change and rash. Neurological:  Negative for seizures and syncope.    All other systems reviewed and are negative. Right Hand Exam     Other   Erythema: absent  Sensation: normal  Pulse: present    Comments:  Triggering of right long finger   Palpable nodule   Mild dupuytren's contracture noted bilaterally   Full composite fist  Sensation intact  Brisk capillary refill            Physical Exam  Vitals and nursing note reviewed. HENT:      Head: Normocephalic. Eyes:      Extraocular Movements: Extraocular movements intact. Cardiovascular:      Rate and Rhythm: Normal rate. Pulses: Normal pulses. Pulmonary:      Effort: Pulmonary effort is normal.   Musculoskeletal:         General: Normal range of motion. Cervical back: Normal range of motion. Comments: As noted in HPI   Skin:     General: Skin is warm and dry. Neurological:      General: No focal deficit present. Mental Status: She is alert. Psychiatric:         Behavior: Behavior normal.         Hand/upper extremity injection: R long A1  Universal Protocol:  Consent: Verbal consent obtained.   Risks and benefits: risks, benefits and alternatives were discussed  Consent given by: patient  Timeout called at: 10/16/2023 12:42 PM.  Patient understanding: patient states understanding of the procedure being performed  Patient identity confirmed: verbally with patient  Supporting Documentation  Indications: diagnostic, pain and tendon swelling   Procedure Details  Condition:trigger finger Location: long finger - R long A1   Preparation: Patient was prepped and draped in the usual sterile fashion  Needle size: 25 G  Ultrasound guidance: no  Medications administered: 0.5 mL lidocaine 1 %; 3 mg betamethasone acetate-betamethasone sodium phosphate 6 (3-3) mg/mL  Patient tolerance: patient tolerated the procedure well with no immediate complications  Dressing:  Sterile dressing applied                Scribe Attestation      I,:  Kiley Hahn am acting as a scribe while in the presence of the attending physician.:       I,:  Marianne Hernandez DO personally performed the services described in this documentation    as scribed in my presence.:                 Portions of the record may have been created with voice recognition software. Occasional wrong word or "sound a like" substitutions may have occurred due to the inherent limitations of voice recognition software. Read the chart carefully and recognize, using context, where substitutions have occurred.

## 2023-12-02 DIAGNOSIS — I10 HYPERTENSION, UNSPECIFIED TYPE: ICD-10-CM

## 2023-12-04 ENCOUNTER — HOSPITAL ENCOUNTER (OUTPATIENT)
Dept: RADIOLOGY | Facility: MEDICAL CENTER | Age: 78
Discharge: HOME | End: 2023-12-04
Payer: MEDICARE

## 2023-12-04 DIAGNOSIS — N95.1 MENOPAUSAL STATE: ICD-10-CM

## 2023-12-04 DIAGNOSIS — N95.9 UNSPECIFIED MENOPAUSAL AND PERIMENOPAUSAL DISORDER: ICD-10-CM

## 2023-12-04 PROCEDURE — 77080 DXA BONE DENSITY AXIAL: CPT

## 2023-12-04 RX ORDER — LOSARTAN POTASSIUM 50 MG/1
TABLET ORAL
Qty: 90 TABLET | Refills: 0 | Status: SHIPPED | OUTPATIENT
Start: 2023-12-04

## 2023-12-20 DIAGNOSIS — K21.9 GASTROESOPHAGEAL REFLUX DISEASE WITHOUT ESOPHAGITIS: ICD-10-CM

## 2023-12-21 RX ORDER — OMEPRAZOLE 10 MG/1
CAPSULE, DELAYED RELEASE ORAL
Qty: 90 CAPSULE | Refills: 1 | Status: SHIPPED | OUTPATIENT
Start: 2023-12-21

## 2024-01-11 DIAGNOSIS — E11.9 TYPE 2 DIABETES MELLITUS WITHOUT COMPLICATION, WITHOUT LONG-TERM CURRENT USE OF INSULIN (HCC): ICD-10-CM

## 2024-01-11 DIAGNOSIS — I10 HYPERTENSION, UNSPECIFIED TYPE: ICD-10-CM

## 2024-01-11 DIAGNOSIS — I10 ESSENTIAL HYPERTENSION: ICD-10-CM

## 2024-01-11 DIAGNOSIS — E78.5 HYPERLIPIDEMIA, UNSPECIFIED HYPERLIPIDEMIA TYPE: ICD-10-CM

## 2024-01-11 RX ORDER — LOSARTAN POTASSIUM 50 MG/1
TABLET ORAL
Qty: 90 TABLET | Refills: 0 | Status: SHIPPED | OUTPATIENT
Start: 2024-01-11

## 2024-01-11 RX ORDER — METFORMIN HYDROCHLORIDE 500 MG/1
TABLET, EXTENDED RELEASE ORAL
Qty: 90 TABLET | Refills: 1 | Status: SHIPPED | OUTPATIENT
Start: 2024-01-11

## 2024-01-11 RX ORDER — TRIAMTERENE AND HYDROCHLOROTHIAZIDE 37.5; 25 MG/1; MG/1
CAPSULE ORAL
Qty: 90 CAPSULE | Refills: 1 | Status: SHIPPED | OUTPATIENT
Start: 2024-01-11

## 2024-01-11 RX ORDER — ATORVASTATIN CALCIUM 10 MG/1
TABLET, FILM COATED ORAL
Qty: 90 TABLET | Refills: 1 | Status: SHIPPED | OUTPATIENT
Start: 2024-01-11

## 2024-02-21 PROBLEM — Z13.820 SCREENING FOR OSTEOPOROSIS: Status: RESOLVED | Noted: 2018-08-16 | Resolved: 2024-02-21

## 2024-02-21 PROBLEM — Z00.00 HEALTHCARE MAINTENANCE: Status: RESOLVED | Noted: 2018-02-01 | Resolved: 2024-02-21

## 2024-02-22 ENCOUNTER — APPOINTMENT (OUTPATIENT)
Dept: LAB | Facility: CLINIC | Age: 79
End: 2024-02-22
Payer: MEDICARE

## 2024-02-22 DIAGNOSIS — N18.31 STAGE 3A CHRONIC KIDNEY DISEASE (HCC): ICD-10-CM

## 2024-02-22 DIAGNOSIS — E11.9 TYPE 2 DIABETES MELLITUS WITHOUT COMPLICATION, WITHOUT LONG-TERM CURRENT USE OF INSULIN (HCC): ICD-10-CM

## 2024-02-22 LAB
ALBUMIN SERPL BCP-MCNC: 4.4 G/DL (ref 3.5–5)
ALP SERPL-CCNC: 67 U/L (ref 34–104)
ALT SERPL W P-5'-P-CCNC: 22 U/L (ref 7–52)
ANION GAP SERPL CALCULATED.3IONS-SCNC: 10 MMOL/L
AST SERPL W P-5'-P-CCNC: 23 U/L (ref 13–39)
BASOPHILS # BLD AUTO: 0.05 THOUSANDS/ÂΜL (ref 0–0.1)
BASOPHILS NFR BLD AUTO: 1 % (ref 0–1)
BILIRUB SERPL-MCNC: 0.69 MG/DL (ref 0.2–1)
BUN SERPL-MCNC: 20 MG/DL (ref 5–25)
CALCIUM SERPL-MCNC: 9.8 MG/DL (ref 8.4–10.2)
CHLORIDE SERPL-SCNC: 103 MMOL/L (ref 96–108)
CHOLEST SERPL-MCNC: 128 MG/DL
CO2 SERPL-SCNC: 28 MMOL/L (ref 21–32)
CREAT SERPL-MCNC: 0.95 MG/DL (ref 0.6–1.3)
CREAT UR-MCNC: 149.2 MG/DL
EOSINOPHIL # BLD AUTO: 0.22 THOUSAND/ÂΜL (ref 0–0.61)
EOSINOPHIL NFR BLD AUTO: 3 % (ref 0–6)
ERYTHROCYTE [DISTWIDTH] IN BLOOD BY AUTOMATED COUNT: 13.8 % (ref 11.6–15.1)
EST. AVERAGE GLUCOSE BLD GHB EST-MCNC: 146 MG/DL
GFR SERPL CREATININE-BSD FRML MDRD: 57 ML/MIN/1.73SQ M
GLUCOSE P FAST SERPL-MCNC: 138 MG/DL (ref 65–99)
HBA1C MFR BLD: 6.7 %
HCT VFR BLD AUTO: 40.7 % (ref 34.8–46.1)
HDLC SERPL-MCNC: 52 MG/DL
HGB BLD-MCNC: 13.1 G/DL (ref 11.5–15.4)
IMM GRANULOCYTES # BLD AUTO: 0.04 THOUSAND/UL (ref 0–0.2)
IMM GRANULOCYTES NFR BLD AUTO: 1 % (ref 0–2)
LDLC SERPL CALC-MCNC: 42 MG/DL (ref 0–100)
LYMPHOCYTES # BLD AUTO: 2.15 THOUSANDS/ÂΜL (ref 0.6–4.47)
LYMPHOCYTES NFR BLD AUTO: 30 % (ref 14–44)
MCH RBC QN AUTO: 30.4 PG (ref 26.8–34.3)
MCHC RBC AUTO-ENTMCNC: 32.2 G/DL (ref 31.4–37.4)
MCV RBC AUTO: 94 FL (ref 82–98)
MICROALBUMIN UR-MCNC: 32.6 MG/L
MICROALBUMIN/CREAT 24H UR: 22 MG/G CREATININE (ref 0–30)
MONOCYTES # BLD AUTO: 0.66 THOUSAND/ÂΜL (ref 0.17–1.22)
MONOCYTES NFR BLD AUTO: 9 % (ref 4–12)
NEUTROPHILS # BLD AUTO: 3.98 THOUSANDS/ÂΜL (ref 1.85–7.62)
NEUTS SEG NFR BLD AUTO: 56 % (ref 43–75)
NONHDLC SERPL-MCNC: 76 MG/DL
NRBC BLD AUTO-RTO: 0 /100 WBCS
PLATELET # BLD AUTO: 331 THOUSANDS/UL (ref 149–390)
PMV BLD AUTO: 9.9 FL (ref 8.9–12.7)
POTASSIUM SERPL-SCNC: 3.9 MMOL/L (ref 3.5–5.3)
PROT SERPL-MCNC: 7.3 G/DL (ref 6.4–8.4)
RBC # BLD AUTO: 4.31 MILLION/UL (ref 3.81–5.12)
SODIUM SERPL-SCNC: 141 MMOL/L (ref 135–147)
TRIGL SERPL-MCNC: 170 MG/DL
WBC # BLD AUTO: 7.1 THOUSAND/UL (ref 4.31–10.16)

## 2024-02-22 PROCEDURE — 80053 COMPREHEN METABOLIC PANEL: CPT

## 2024-02-22 PROCEDURE — 82570 ASSAY OF URINE CREATININE: CPT

## 2024-02-22 PROCEDURE — 83036 HEMOGLOBIN GLYCOSYLATED A1C: CPT

## 2024-02-22 PROCEDURE — 85025 COMPLETE CBC W/AUTO DIFF WBC: CPT

## 2024-02-22 PROCEDURE — 82043 UR ALBUMIN QUANTITATIVE: CPT

## 2024-02-22 PROCEDURE — 80061 LIPID PANEL: CPT

## 2024-02-22 PROCEDURE — 36415 COLL VENOUS BLD VENIPUNCTURE: CPT

## 2024-02-27 ENCOUNTER — RA CDI HCC (OUTPATIENT)
Dept: OTHER | Facility: HOSPITAL | Age: 79
End: 2024-02-27

## 2024-03-04 ENCOUNTER — OFFICE VISIT (OUTPATIENT)
Dept: FAMILY MEDICINE CLINIC | Facility: CLINIC | Age: 79
End: 2024-03-04

## 2024-03-04 VITALS
DIASTOLIC BLOOD PRESSURE: 80 MMHG | SYSTOLIC BLOOD PRESSURE: 147 MMHG | OXYGEN SATURATION: 98 % | TEMPERATURE: 98.3 F | WEIGHT: 201 LBS | HEART RATE: 60 BPM | BODY MASS INDEX: 37.98 KG/M2 | RESPIRATION RATE: 18 BRPM

## 2024-03-04 DIAGNOSIS — N18.31 STAGE 3A CHRONIC KIDNEY DISEASE (HCC): ICD-10-CM

## 2024-03-04 DIAGNOSIS — J44.9 OSA AND COPD OVERLAP SYNDROME (HCC): ICD-10-CM

## 2024-03-04 DIAGNOSIS — G47.33 OSA AND COPD OVERLAP SYNDROME (HCC): ICD-10-CM

## 2024-03-04 DIAGNOSIS — E11.22 TYPE 2 DIABETES MELLITUS WITH STAGE 3A CHRONIC KIDNEY DISEASE, WITH LONG-TERM CURRENT USE OF INSULIN (HCC): Primary | ICD-10-CM

## 2024-03-04 DIAGNOSIS — N18.31 TYPE 2 DIABETES MELLITUS WITH STAGE 3A CHRONIC KIDNEY DISEASE, WITH LONG-TERM CURRENT USE OF INSULIN (HCC): Primary | ICD-10-CM

## 2024-03-04 DIAGNOSIS — N89.8 VAGINAL ITCHING: ICD-10-CM

## 2024-03-04 DIAGNOSIS — Z79.4 TYPE 2 DIABETES MELLITUS WITH STAGE 3A CHRONIC KIDNEY DISEASE, WITH LONG-TERM CURRENT USE OF INSULIN (HCC): Primary | ICD-10-CM

## 2024-03-04 DIAGNOSIS — E66.01 MORBID OBESITY (HCC): ICD-10-CM

## 2024-03-04 PROCEDURE — 99214 OFFICE O/P EST MOD 30 MIN: CPT | Performed by: FAMILY MEDICINE

## 2024-03-04 PROCEDURE — G2211 COMPLEX E/M VISIT ADD ON: HCPCS | Performed by: FAMILY MEDICINE

## 2024-03-04 RX ORDER — TRIAMCINOLONE ACETONIDE 5 MG/G
OINTMENT TOPICAL 2 TIMES DAILY
Qty: 15 G | Refills: 0 | Status: SHIPPED | OUTPATIENT
Start: 2024-03-04

## 2024-03-04 NOTE — ASSESSMENT & PLAN NOTE
Patient with BMI 37.98, complains of fatigue throughout the day. Patient was counseled on healthy eating habits including fruits and vegetables. Patient endorses she has not been exercising regularly for some time now and was advised to start by trying to walk around the house as a first measure in improving exercise.    Plan:  - encourage walking  - encourage healthy, well balanced diet

## 2024-03-04 NOTE — ASSESSMENT & PLAN NOTE
Lab Results   Component Value Date    HGBA1C 6.7 (H) 02/22/2024   Patient with hx T2DM is overall feeling well. Hba1C is 6.7 today from 6.8. Current medications are metformin 500mg.    Plan:  - continue current medications

## 2024-03-04 NOTE — ASSESSMENT & PLAN NOTE
Patient with hx COPD treated with CPAP. Patient reports no concerns and is getting restful sleep. Patient reports some clear rhinorrhea in the morning, attributed to the condensation from CPAP.    Plan:  - continue CPAP  - f/u in 6 mo

## 2024-03-04 NOTE — ASSESSMENT & PLAN NOTE
Patient with hx lichen sclerosis, endorses she needs a refill in her medication. Patient is to use Kenalog ointment daily.    Plan:  - Kenalog 0.5% ointment

## 2024-03-04 NOTE — PROGRESS NOTES
Name: Amanda Naik      : 1945      MRN: [id number]  Encounter Provider: Kimberly Dawkins  Encounter Date: 3/4/2024   Encounter department: [facility]    Assessment & Plan     1. Type 2 diabetes mellitus with stage 3a chronic kidney disease, with long-term current use of insulin (HCC)  Assessment & Plan:    Lab Results   Component Value Date    HGBA1C 6.7 (H) 2024   Patient with hx T2DM is overall feeling well. Hba1C is 6.7 today from 6.8. Current medications are metformin 500mg.    Plan:  - continue current medications      2. Vaginal itching  Assessment & Plan:  Patient with hx lichen sclerosis, endorses she needs a refill in her medication. Patient is to use Kenalog ointment daily.    Plan:  - Kenalog 0.5% ointment    Orders:  -     triamcinolone (KENALOG) 0.5 % ointment; Apply topically 2 (two) times a day    3. JAYA and COPD overlap syndrome (HCC)  Assessment & Plan:  Patient with hx COPD treated with CPAP. Patient reports no concerns and is getting restful sleep. Patient reports some clear rhinorrhea in the morning, attributed to the condensation from CPAP.    Plan:  - continue CPAP  - f/u in 6 mo      4. Morbid obesity (HCC)  Assessment & Plan:  Patient with BMI 37.98, complains of fatigue throughout the day. Patient was counseled on healthy eating habits including fruits and vegetables. Patient endorses she has not been exercising regularly for some time now and was advised to start by trying to walk around the house as a first measure in improving exercise.    Plan:  - encourage walking  - encourage healthy, well balanced diet      5. Stage 3a chronic kidney disease (HCC)  Assessment & Plan:  Lab Results   Component Value Date    EGFR 57 2024    EGFR 53 2023    EGFR 55 2023    CREATININE 0.95 2024    CREATININE 1.02 2023    CREATININE 0.98 2023   Patient with hx CKD presents with no complaints at this time. GFR  is 57 which is CKD 3A. Patient is stable.    Plan:  - f/u in 6 months             Subjective      Patient is a 78 yo F presents today for follow up and reports low energy throughout the day which has been ongoing for several years. Patient endorses that she doesn't exercise much, eats a somewhat balanced diet with minimal fruit and occasional vegetables. 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 Patient wears a hat for sun protection. Patient reports tingling in feet with rest. Patient endorses headache for 3 days starting Thursday in the frontal area and worse with bending forward that resovled with tylenol. Otherwise patient is doing well.      Review of Systems   Constitutional:  Negative for fever and unexpected weight change.   HENT:  Negative for congestion and rhinorrhea.    Eyes:  Negative for visual disturbance.   Respiratory:  Negative for cough and shortness of breath.    Cardiovascular:  Negative for chest pain.   Gastrointestinal:  Negative for abdominal pain, constipation and diarrhea.   Genitourinary:  Negative for dysuria.   Musculoskeletal:  Negative for joint swelling.   Skin:  Negative for rash.   Neurological:  Positive for headaches.   Hematological:  Negative for adenopathy.   Psychiatric/Behavioral:  Negative for agitation and behavioral problems.        Current Outpatient Medications on File Prior to Visit   Medication Sig   • aspirin 81 MG tablet Take 81 mg by mouth daily.   • atorvastatin (LIPITOR) 10 mg tablet TAKE 1 TABLET BY MOUTH EVERY DAY   • betamethasone valerate (VALISONE) 0.1 % ointment Apply topically daily   • dorzolamide-timolol (COSOPT) 22.3-6.8 MG/ML ophthalmic solution INSTILL 1 DROP INTO BOTH EYES TWICE A DAY   • fluorouracil (EFUDEX) 5 % cream Apply 1 application topically as needed    • gabapentin (NEURONTIN) 400 mg capsule Take 1 capsule (400 mg total) by mouth daily at bedtime   • Klor-Con M10 10 MEQ tablet TAKE 1 TABLET (10  MEQ TOTAL) BY MOUTH 2 (TWO) TIMES A DAY FOR 90 DAYS   • losartan (COZAAR) 50 mg tablet TAKE 1 TABLET BY MOUTH EVERY DAY   • metFORMIN (GLUCOPHAGE-XR) 500 mg 24 hr tablet TAKE 1 TABLET BY MOUTH EVERY DAY WITH DINNER   • omeprazole (PriLOSEC) 10 mg delayed release capsule TAKE 1 CAPSULE BY MOUTH EVERY DAY   • triamterene-hydrochlorothiazide (DYAZIDE) 37.5-25 mg per capsule TAKE 1 CAPSULE BY MOUTH EVERY DAY   • verapamil (CALAN) 40 mg tablet TAKE 1 TABLET BY MOUTH TWICE A DAY   • [DISCONTINUED] triamcinolone (KENALOG) 0.5 % ointment APPLY TO AFFECTED AREA TWICE A DAY FOR 14 DAYS   • famotidine (PEPCID) 40 MG tablet Take 1 tablet (40 mg total) by mouth daily       Objective     /80 (BP Location: Left arm, Patient Position: Sitting, Cuff Size: Large)   Pulse 60   Temp 98.3 °F (36.8 °C)   Resp 18   Wt 91.2 kg (201 lb)   SpO2 98%   BMI 37.98 kg/m²     Physical Exam  Constitutional:       Appearance: Normal appearance.   HENT:      Head: Normocephalic and atraumatic.      Right Ear: External ear normal.      Left Ear: External ear normal.      Nose: Nose normal.      Mouth/Throat:      Mouth: Mucous membranes are moist.   Eyes:      Pupils: Pupils are equal, round, and reactive to light.   Neck:      Vascular: No carotid bruit.   Cardiovascular:      Rate and Rhythm: Normal rate and regular rhythm.      Pulses: Normal pulses.      Heart sounds: Normal heart sounds. No murmur heard.  Pulmonary:      Effort: Pulmonary effort is normal.      Breath sounds: Normal breath sounds. No wheezing.   Abdominal:      General: Abdomen is flat. There is no distension.      Palpations: Abdomen is soft.      Tenderness: There is no abdominal tenderness.   Skin:     General: Skin is warm.      Capillary Refill: Capillary refill takes less than 2 seconds.   Neurological:      General: No focal deficit present.      Mental Status: She is alert and oriented to person, place, and time.   Psychiatric:         Mood and Affect: Mood  normal.         Behavior: Behavior normal.       Appointment on 02/22/2024   Component Date Value Ref Range Status   • WBC 02/22/2024 7.10  4.31 - 10.16 Thousand/uL Final   • RBC 02/22/2024 4.31  3.81 - 5.12 Million/uL Final   • Hemoglobin 02/22/2024 13.1  11.5 - 15.4 g/dL Final   • Hematocrit 02/22/2024 40.7  34.8 - 46.1 % Final   • MCV 02/22/2024 94  82 - 98 fL Final   • MCH 02/22/2024 30.4  26.8 - 34.3 pg Final   • MCHC 02/22/2024 32.2  31.4 - 37.4 g/dL Final   • RDW 02/22/2024 13.8  11.6 - 15.1 % Final   • MPV 02/22/2024 9.9  8.9 - 12.7 fL Final   • Platelets 02/22/2024 331  149 - 390 Thousands/uL Final   • nRBC 02/22/2024 0  /100 WBCs Final   • Neutrophils Relative 02/22/2024 56  43 - 75 % Final   • Immat GRANS % 02/22/2024 1  0 - 2 % Final   • Lymphocytes Relative 02/22/2024 30  14 - 44 % Final   • Monocytes Relative 02/22/2024 9  4 - 12 % Final   • Eosinophils Relative 02/22/2024 3  0 - 6 % Final   • Basophils Relative 02/22/2024 1  0 - 1 % Final   • Neutrophils Absolute 02/22/2024 3.98  1.85 - 7.62 Thousands/µL Final   • Immature Grans Absolute 02/22/2024 0.04  0.00 - 0.20 Thousand/uL Final   • Lymphocytes Absolute 02/22/2024 2.15  0.60 - 4.47 Thousands/µL Final   • Monocytes Absolute 02/22/2024 0.66  0.17 - 1.22 Thousand/µL Final   • Eosinophils Absolute 02/22/2024 0.22  0.00 - 0.61 Thousand/µL Final   • Basophils Absolute 02/22/2024 0.05  0.00 - 0.10 Thousands/µL Final   • Sodium 02/22/2024 141  135 - 147 mmol/L Final   • Potassium 02/22/2024 3.9  3.5 - 5.3 mmol/L Final   • Chloride 02/22/2024 103  96 - 108 mmol/L Final   • CO2 02/22/2024 28  21 - 32 mmol/L Final   • ANION GAP 02/22/2024 10  mmol/L Final   • BUN 02/22/2024 20  5 - 25 mg/dL Final   • Creatinine 02/22/2024 0.95  0.60 - 1.30 mg/dL Final    Standardized to IDMS reference method   • Glucose, Fasting 02/22/2024 138 (H)  65 - 99 mg/dL Final   • Calcium 02/22/2024 9.8  8.4 - 10.2 mg/dL Final   • AST 02/22/2024 23  13 - 39 U/L Final   • ALT  02/22/2024 22  7 - 52 U/L Final    Specimen collection should occur prior to Sulfasalazine administration due to the potential for falsely depressed results.    • Alkaline Phosphatase 02/22/2024 67  34 - 104 U/L Final   • Total Protein 02/22/2024 7.3  6.4 - 8.4 g/dL Final   • Albumin 02/22/2024 4.4  3.5 - 5.0 g/dL Final   • Total Bilirubin 02/22/2024 0.69  0.20 - 1.00 mg/dL Final    Use of this assay is not recommended for patients undergoing treatment with eltrombopag due to the potential for falsely elevated results.  N-acetyl-p-benzoquinone imine (metabolite of Acetaminophen) will generate erroneously low results in samples for patients that have taken an overdose of Acetaminophen.   • eGFR 02/22/2024 57  ml/min/1.73sq m Final   • Hemoglobin A1C 02/22/2024 6.7 (H)  Normal 4.0-5.6%; PreDiabetic 5.7-6.4%; Diabetic >=6.5%; Glycemic control for adults with diabetes <7.0% % Final   • EAG 02/22/2024 146  mg/dl Final   • Cholesterol 02/22/2024 128  See Comment mg/dL Final    Cholesterol:         Pediatric <18 Years        Desirable          <170 mg/dL      Borderline High    170-199 mg/dL      High               >=200 mg/dL        Adult >=18 Years            Desirable         <200 mg/dL      Borderline High   200-239 mg/dL      High              >239 mg/dL     • Triglycerides 02/22/2024 170 (H)  See Comment mg/dL Final    Triglyceride:     0-9Y            <75mg/dL     10Y-17Y         <90 mg/dL       >=18Y     Normal          <150 mg/dL     Borderline High 150-199 mg/dL     High            200-499 mg/dL        Very High       >499 mg/dL    Specimen collection should occur prior to Metamizole administration due to the potential for falsely depressed results.   • HDL, Direct 02/22/2024 52  >=50 mg/dL Final   • LDL Calculated 02/22/2024 42  0 - 100 mg/dL Final    LDL Cholesterol:     Optimal           <100 mg/dl     Near Optimal      100-129 mg/dl     Above Optimal       Borderline High 130-159 mg/dl       High             160-189 mg/dl       Very High       >189 mg/dl         This screening LDL is a calculated result.   It does not have the accuracy of the Direct Measured LDL in the monitoring of patients with hyperlipidemia and/or statin therapy.   Direct Measure LDL (PKO004) must be ordered separately in these patients.   • Non-HDL-Chol (CHOL-HDL) 02/22/2024 76  mg/dl Final   • Creatinine, Ur 02/22/2024 149.2  Reference range not established. mg/dL Final   • Albumin,U,Random 02/22/2024 32.6 (H)  <20.0 mg/L Final   • Albumin Creat Ratio 02/22/2024 22  0 - 30 mg/g creatinine Final   Telephone on 09/26/2023   Component Date Value Ref Range Status   • Supplier Name 09/26/2023 [employer]   Final-Edited   • Supplier Phone Number 09/26/2023 [phone]   Final-Edited   • Order Status 09/26/2023 Delivery Successful   Final-Edited   • Delivery Request Date 09/26/2023 09/26/2023   Final-Edited   • Date Delivered  09/26/2023 09/28/2023   Final-Edited   • Item Description 09/26/2023 PAP Accessory   Final-Edited    Qty: 1   • Item Description 09/26/2023 PAP Mask, Nasal, Fit Upon Setup, N/A, 1 per 3 months   Final-Edited    Qty: 1   • Item Description 09/26/2023 Humidifier Water Chamber, 1 per 6 months   Final-Edited    Qty: 1   • Item Description 09/26/2023 PAP Headgear, 1 per 6 months   Final-Edited    Qty: 1   • Item Description 09/26/2023 PAP Chinstrap, 1 per 6 months   Final-Edited    Qty: 1   • Item Description 09/26/2023 PAP Humidifier, Heated   Final-Edited    Qty: 1   • Item Description 09/26/2023 Heated PAP Tubing, 1 per 3 months   Final-Edited    Qty: 1   • Item Description 09/26/2023 Disposable PAP Filter, 2 per 1 month   Final-Edited    Qty: 1   • Item Description 09/26/2023 Non-Disposable PAP Filter, 1 per 6 months   Final-Edited    Qty: 1   • Item Description 09/26/2023 PAP Mask Interface Cushion, Nasal, 2 per 1 month   Final-Edited    Qty: 1   Appointment on 09/19/2023   Component Date Value Ref Range  Status   • WBC 09/19/2023 6.25  4.31 - 10.16 Thousand/uL Final   • RBC 09/19/2023 4.24  3.81 - 5.12 Million/uL Final   • Hemoglobin 09/19/2023 13.1  11.5 - 15.4 g/dL Final   • Hematocrit 09/19/2023 39.9  34.8 - 46.1 % Final   • MCV 09/19/2023 94  82 - 98 fL Final   • MCH 09/19/2023 30.9  26.8 - 34.3 pg Final   • MCHC 09/19/2023 32.8  31.4 - 37.4 g/dL Final   • RDW 09/19/2023 14.0  11.6 - 15.1 % Final   • MPV 09/19/2023 9.9  8.9 - 12.7 fL Final   • Platelets 09/19/2023 327  149 - 390 Thousands/uL Final   • nRBC 09/19/2023 0  /100 WBCs Final   • Neutrophils Relative 09/19/2023 53  43 - 75 % Final   • Immat GRANS % 09/19/2023 0  0 - 2 % Final   • Lymphocytes Relative 09/19/2023 33  14 - 44 % Final   • Monocytes Relative 09/19/2023 10  4 - 12 % Final   • Eosinophils Relative 09/19/2023 3  0 - 6 % Final   • Basophils Relative 09/19/2023 1  0 - 1 % Final   • Neutrophils Absolute 09/19/2023 3.33  1.85 - 7.62 Thousands/µL Final   • Immature Grans Absolute 09/19/2023 0.02  0.00 - 0.20 Thousand/uL Final   • Lymphocytes Absolute 09/19/2023 2.07  0.60 - 4.47 Thousands/µL Final   • Monocytes Absolute 09/19/2023 0.61  0.17 - 1.22 Thousand/µL Final   • Eosinophils Absolute 09/19/2023 0.19  0.00 - 0.61 Thousand/µL Final   • Basophils Absolute 09/19/2023 0.03  0.00 - 0.10 Thousands/µL Final   • Sodium 09/19/2023 137  135 - 147 mmol/L Final   • Potassium 09/19/2023 3.9  3.5 - 5.3 mmol/L Final   • Chloride 09/19/2023 102  96 - 108 mmol/L Final   • CO2 09/19/2023 24  21 - 32 mmol/L Final   • ANION GAP 09/19/2023 11  mmol/L Final   • BUN 09/19/2023 15  5 - 25 mg/dL Final   • Creatinine 09/19/2023 1.02  0.60 - 1.30 mg/dL Final    Standardized to IDMS reference method   • Glucose, Fasting 09/19/2023 138 (H)  65 - 99 mg/dL Final   • Calcium 09/19/2023 9.7  8.4 - 10.2 mg/dL Final   • AST 09/19/2023 23  13 - 39 U/L Final   • ALT 09/19/2023 26  7 - 52 U/L Final    Specimen collection should occur prior to Sulfasalazine administration due to  the potential for falsely depressed results.    • Alkaline Phosphatase 09/19/2023 69  34 - 104 U/L Final   • Total Protein 09/19/2023 7.4  6.4 - 8.4 g/dL Final   • Albumin 09/19/2023 4.3  3.5 - 5.0 g/dL Final   • Total Bilirubin 09/19/2023 0.67  0.20 - 1.00 mg/dL Final    Use of this assay is not recommended for patients undergoing treatment with eltrombopag due to the potential for falsely elevated results.  N-acetyl-p-benzoquinone imine (metabolite of Acetaminophen) will generate erroneously low results in samples for patients that have taken an overdose of Acetaminophen.   • eGFR 09/19/2023 53  ml/min/1.73sq m Final   • Hemoglobin A1C 09/19/2023 6.8 (H)  Normal 4.0-5.6%; PreDiabetic 5.7-6.4%; Diabetic >=6.5%; Glycemic control for adults with diabetes <7.0% % Final   • EAG 09/19/2023 148  mg/dl Final   • Cholesterol 09/19/2023 120  See Comment mg/dL Final    Cholesterol:         Pediatric <18 Years        Desirable          <170 mg/dL      Borderline High    170-199 mg/dL      High               >=200 mg/dL        Adult >=18 Years            Desirable         <200 mg/dL      Borderline High   200-239 mg/dL      High              >239 mg/dL     • Triglycerides 09/19/2023 161 (H)  See Comment mg/dL Final    Triglyceride:     0-9Y            <75mg/dL     10Y-17Y         <90 mg/dL       >=18Y     Normal          <150 mg/dL     Borderline High 150-199 mg/dL     High            200-499 mg/dL        Very High       >499 mg/dL    Specimen collection should occur prior to Metamizole administration due to the potential for falsely depressed results.   • HDL, Direct 09/19/2023 47 (L)  >=50 mg/dL Final   • LDL Calculated 09/19/2023 41  0 - 100 mg/dL Final    LDL Cholesterol:     Optimal           <100 mg/dl     Near Optimal      100-129 mg/dl     Above Optimal       Borderline High 130-159 mg/dl       High            160-189 mg/dl       Very High       >189 mg/dl         This screening LDL is a calculated result.   It does  not have the accuracy of the Direct Measured LDL in the monitoring of patients with hyperlipidemia and/or statin therapy.   Direct Measure LDL (BBR984) must be ordered separately in these patients.       Kimberly Dawkins

## 2024-03-04 NOTE — ASSESSMENT & PLAN NOTE
Lab Results   Component Value Date    EGFR 57 02/22/2024    EGFR 53 09/19/2023    EGFR 55 03/16/2023    CREATININE 0.95 02/22/2024    CREATININE 1.02 09/19/2023    CREATININE 0.98 03/16/2023   Patient with hx CKD presents with no complaints at this time. GFR is 57 which is CKD 3A. Patient is stable.    Plan:  - f/u in 6 months

## 2024-04-07 DIAGNOSIS — G44.85 STABBING HEADACHE: ICD-10-CM

## 2024-04-07 DIAGNOSIS — M54.81 BILATERAL OCCIPITAL NEURALGIA: ICD-10-CM

## 2024-04-07 DIAGNOSIS — I10 BENIGN ESSENTIAL HYPERTENSION: ICD-10-CM

## 2024-04-08 RX ORDER — POTASSIUM CHLORIDE 750 MG/1
TABLET, EXTENDED RELEASE ORAL
Qty: 180 TABLET | Refills: 1 | Status: SHIPPED | OUTPATIENT
Start: 2024-04-08

## 2024-04-08 RX ORDER — VERAPAMIL HYDROCHLORIDE 40 MG/1
40 TABLET ORAL 2 TIMES DAILY
Qty: 180 TABLET | Refills: 1 | Status: SHIPPED | OUTPATIENT
Start: 2024-04-08

## 2024-06-01 DIAGNOSIS — I10 HYPERTENSION, UNSPECIFIED TYPE: ICD-10-CM

## 2024-06-03 RX ORDER — LOSARTAN POTASSIUM 50 MG/1
TABLET ORAL
Qty: 90 TABLET | Refills: 0 | Status: SHIPPED | OUTPATIENT
Start: 2024-06-03

## 2024-06-06 DIAGNOSIS — N89.8 VAGINAL ITCHING: ICD-10-CM

## 2024-06-06 DIAGNOSIS — K21.9 GASTROESOPHAGEAL REFLUX DISEASE WITHOUT ESOPHAGITIS: ICD-10-CM

## 2024-06-06 DIAGNOSIS — E78.5 HYPERLIPIDEMIA, UNSPECIFIED HYPERLIPIDEMIA TYPE: ICD-10-CM

## 2024-06-06 RX ORDER — TRIAMCINOLONE ACETONIDE 5 MG/G
1 OINTMENT TOPICAL 2 TIMES DAILY
Qty: 15 G | Refills: 0 | Status: SHIPPED | OUTPATIENT
Start: 2024-06-06

## 2024-06-06 RX ORDER — ATORVASTATIN CALCIUM 10 MG/1
TABLET, FILM COATED ORAL
Qty: 90 TABLET | Refills: 1 | Status: SHIPPED | OUTPATIENT
Start: 2024-06-06

## 2024-06-06 RX ORDER — OMEPRAZOLE 10 MG/1
CAPSULE, DELAYED RELEASE ORAL
Qty: 90 CAPSULE | Refills: 1 | Status: SHIPPED | OUTPATIENT
Start: 2024-06-06

## 2024-06-09 ENCOUNTER — APPOINTMENT (EMERGENCY)
Dept: RADIOLOGY | Facility: HOSPITAL | Age: 79
End: 2024-06-09
Payer: MEDICARE

## 2024-06-09 ENCOUNTER — HOSPITAL ENCOUNTER (EMERGENCY)
Facility: HOSPITAL | Age: 79
Discharge: HOME | End: 2024-06-09
Attending: EMERGENCY MEDICINE
Payer: MEDICARE

## 2024-06-09 VITALS
DIASTOLIC BLOOD PRESSURE: 68 MMHG | HEART RATE: 79 BPM | OXYGEN SATURATION: 97 % | TEMPERATURE: 97.5 F | SYSTOLIC BLOOD PRESSURE: 160 MMHG | RESPIRATION RATE: 20 BRPM

## 2024-06-09 DIAGNOSIS — S63.502A SPRAIN OF LEFT WRIST, INITIAL ENCOUNTER: ICD-10-CM
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
## 2024-06-09 PROCEDURE — 90471 IMMUNIZATION ADMIN: CPT

## 2024-06-09 PROCEDURE — 99284 EMERGENCY DEPT VISIT MOD MDM: CPT | Performed by: EMERGENCY MEDICINE

## 2024-06-09 PROCEDURE — 73110 X-RAY EXAM OF WRIST: CPT

## 2024-06-09 PROCEDURE — 99284 EMERGENCY DEPT VISIT MOD MDM: CPT

## 2024-06-09 PROCEDURE — 90715 TDAP VACCINE 7 YRS/> IM: CPT

## 2024-06-09 PROCEDURE — 73130 X-RAY EXAM OF HAND: CPT

## 2024-06-09 RX ADMIN — TETANUS TOXOID, REDUCED DIPHTHERIA TOXOID AND ACELLULAR PERTUSSIS VACCINE, ADSORBED 0.5 ML: 5; 2.5; 8; 8; 2.5 SUSPENSION INTRAMUSCULAR at 17:01

## 2024-06-09 NOTE — ED ATTENDING ATTESTATION
6/9/2024  I, Jered Rojo MD, saw and evaluated the patient. I have discussed the patient with the resident/non-physician practitioner and agree with the resident's/non-physician practitioner's findings, Plan of Care, and MDM as documented in the resident's/non-physician practitioner's note, except where noted. All available labs and Radiology studies were reviewed.  I was present for key portions of any procedure(s) performed by the resident/non-physician practitioner and I was immediately available to provide assistance.       At this point I agree with the current assessment done in the Emergency Department.  I have conducted an independent evaluation of this patient a history and physical is as follows:    79-year-old female presented for evaluation after trip and fall earlier today landing on outstretched hands and her right knee.  Complains of pain in the left wrist and the right knee.  Small abrasion of the right knee.  Some pain with movement of the wrist with some tenderness over the distal ulna.  No paresthesias, weakness.  Capillary refill, radial pulse, movement of the fingers normal.    ED Course     X-ray of wrist and hand negative for fractures.  Patient was placed in an Ace wrap with symptomatic improvement.  Suspect a sprain of the wrist.  Continue to use Ace wrap as needed.  Can follow-up with PCP.    Critical Care Time  Procedures

## 2024-06-09 NOTE — ED PROVIDER NOTES
History  Chief Complaint   Patient presents with    Fall     Pt presenting for a trip and fall, she landed on her hands and right knee. Denies head strike or LOC. Takes aspirin daily. Pt c/o left wrist and right knee pain. Ambulatory in triage.      79-year-old female with extensive PMH including HTN, HLD, DM, and GERD who presents to the ED following a mechanical injury that happened this morning.  Patient states around 9:30 AM this morning she was walking on the sidewalk when she had tripped due to the change in sidewalk surface.  Patient denies any preceding symptoms as well as loss of consciousness or head strike.  Patient states when she fell she had landed on her left wrist.  Patient also denied any noted noises or sensations when landing on her wrist.  Throughout the day, patient states the pain in her left wrist started to worsen making it hard for her to carry objects or apply pressure.  Patient's pain is located on the posterior aspect of the wrist over the ulnar.  Patient also states that she has a scrape on her right knee but no pain.  Patient also mentions that she is on aspirin. Denies chest pain, SOB, cough, abdominal pain, n/v/d, fever, chills, dizziness, lightheadedness, HA, dysuria, hematuria, hematochezia, or melena.               Prior to Admission Medications   Prescriptions Last Dose Informant Patient Reported? Taking?   Klor-Con M10 10 MEQ tablet   No No   Sig: TAKE 1 TABLET (10 MEQ TOTAL) BY MOUTH 2 (TWO) TIMES A DAY FOR 90 DAYS   aspirin 81 MG tablet 6/9/2024 Self Yes Yes   Sig: Take 81 mg by mouth daily.   atorvastatin (LIPITOR) 10 mg tablet   No No   Sig: TAKE 1 TABLET BY MOUTH EVERY DAY   betamethasone valerate (VALISONE) 0.1 % ointment  Self No No   Sig: Apply topically daily   dorzolamide-timolol (COSOPT) 22.3-6.8 MG/ML ophthalmic solution  Self Yes No   Sig: INSTILL 1 DROP INTO BOTH EYES TWICE A DAY   famotidine (PEPCID) 40 MG tablet   No No   Sig: Take 1 tablet (40 mg total) by mouth  daily   fluorouracil (EFUDEX) 5 % cream  Self Yes No   Sig: Apply 1 application topically as needed    gabapentin (NEURONTIN) 400 mg capsule   No No   Sig: Take 1 capsule (400 mg total) by mouth daily at bedtime   losartan (COZAAR) 50 mg tablet   No No   Sig: TAKE 1 TABLET BY MOUTH EVERY DAY   metFORMIN (GLUCOPHAGE-XR) 500 mg 24 hr tablet   No No   Sig: TAKE 1 TABLET BY MOUTH EVERY DAY WITH DINNER   omeprazole (PriLOSEC) 10 mg delayed release capsule   No No   Sig: TAKE 1 CAPSULE BY MOUTH EVERY DAY   triamcinolone (KENALOG) 0.5 % ointment   No No   Sig: APPLY TO AFFECTED AREA TWICE A DAY   triamterene-hydrochlorothiazide (DYAZIDE) 37.5-25 mg per capsule   No No   Sig: TAKE 1 CAPSULE BY MOUTH EVERY DAY   verapamil (CALAN) 40 mg tablet   No No   Sig: TAKE 1 TABLET BY MOUTH TWICE A DAY      Facility-Administered Medications: None       Past Medical History:   Diagnosis Date    Abnormal mammogram of right breast 06/29/2017    Actinic keratosis     Acute medial meniscal tear, left, initial encounter 06/29/2017    Arthritis     Basal cell carcinoma     Colon polyp     Diabetes mellitus (HCC)     Generalized anxiety disorder 08/31/2017    GERD (gastroesophageal reflux disease)     Herpes zoster 09/26/2016    Hyperlipidemia     Hypertension     Hypokalemia     Left medial knee pain     Localized osteoarthritis of left knee 03/15/2016    Post-traumatic headache 09/19/2017    Restless leg syndrome     Sleep apnea     Squamous cell skin cancer        Past Surgical History:   Procedure Laterality Date    ANKLE FRACTURE SURGERY Right     ARTHROSCOPY KNEE Left 05/26/2016    Procedure: ARTHROSCOPY KNEE, PARTILA MEDIAL MENISECTOMY;  Surgeon: Max Lang MD;  Location: [facility];  Service:     CHOLECYSTECTOMY      COLONOSCOPY      HYSTERECTOMY      57    OOPHORECTOMY      57    SQUAMOUS CELL CARCINOMA EXCISION      forehead    UPPER GASTROINTESTINAL ENDOSCOPY         Family History   Problem Relation Age of Onset    Stroke  Mother     Stroke Father     Cerebral aneurysm Sister     Uterine cancer Sister 55    No Known Problems Sister     No Known Problems Maternal Grandmother     No Known Problems Maternal Grandfather     No Known Problems Paternal Grandmother     No Known Problems Paternal Grandfather     Colon cancer Maternal Aunt     No Known Problems Maternal Aunt     No Known Problems Maternal Aunt     No Known Problems Daughter     Hypertension Other     Diabetes Other     Breast cancer Neg Hx      I have reviewed and agree with the history as documented.    E-Cigarette/Vaping    E-Cigarette Use Never User      E-Cigarette/Vaping Substances    Nicotine No     THC No     CBD No     Flavoring No     Other No     Unknown No      Social History     Tobacco Use    Smoking status: Never    Smokeless tobacco: Never   Vaping Use    Vaping status: Never Used   Substance Use Topics    Alcohol use: Yes     Comment: rare    Drug use: No        Review of Systems   Constitutional:  Negative for appetite change, chills, diaphoresis, fatigue and fever.   HENT:  Negative for congestion, ear pain, postnasal drip, rhinorrhea, sore throat and trouble swallowing.    Eyes:  Negative for pain and visual disturbance.   Respiratory:  Negative for cough and shortness of breath.    Cardiovascular:  Negative for chest pain and palpitations.   Gastrointestinal:  Negative for abdominal pain, constipation, diarrhea, nausea and vomiting.   Genitourinary:  Negative for decreased urine volume, dysuria and hematuria.   Musculoskeletal:  Positive for arthralgias. Negative for back pain.        Left wrist pain   Skin:  Positive for wound. Negative for color change and rash.        Right knee abrasion   Neurological:  Negative for dizziness, seizures, syncope, weakness, light-headedness and headaches.   All other systems reviewed and are negative.      Physical Exam  ED Triage Vitals [06/09/24 1543]   Temperature Pulse Respirations Blood Pressure SpO2   97.5 °F (36.4  °C) 79 20 160/68 97 %      Temp Source Heart Rate Source Patient Position - Orthostatic VS BP Location FiO2 (%)   Oral Monitor -- Right arm --      Pain Score       --             Orthostatic Vital Signs  Vitals:    06/09/24 1543   BP: 160/68   Pulse: 79       Physical Exam  Vitals and nursing note reviewed.   Constitutional:       General: She is not in acute distress.     Appearance: Normal appearance. She is normal weight.   HENT:      Head: Normocephalic and atraumatic.      Right Ear: External ear normal.      Left Ear: External ear normal.      Nose: Nose normal.      Mouth/Throat:      Pharynx: Oropharynx is clear.   Eyes:      Conjunctiva/sclera: Conjunctivae normal.   Cardiovascular:      Rate and Rhythm: Normal rate and regular rhythm.      Pulses: Normal pulses.      Heart sounds: Normal heart sounds.      Comments: RRR with +S1 and S2, no murmurs appreciated on exam. Peripheral pulses intact.    Pulmonary:      Effort: Pulmonary effort is normal. No respiratory distress.      Breath sounds: Normal breath sounds. No wheezing, rhonchi or rales.      Comments: CTABL with no abnormal lung sounds such as wheezes or rales appreciated on exam.     Abdominal:      General: Abdomen is flat. Bowel sounds are normal. There is no distension.      Palpations: Abdomen is soft.      Tenderness: There is no abdominal tenderness.      Comments: Soft, non tender, normo-active bowel sounds. Without rigidity, guarding, or distension.     Musculoskeletal:         General: Swelling and tenderness present. No deformity. Normal range of motion.      Cervical back: Normal range of motion.      Right lower leg: No edema.      Left lower leg: No edema.      Comments: Mild tenderness to palpation of the left posterior wrist over the ulnar aspect along with in the snuff box. Full ROM with active and passive movement of the joints. 5/5 strength in BL upper and lower extremities. No signs of abrasions, ecchymosis, erythema, or gross  deformity was noted.     Skin:     General: Skin is warm and dry.      Findings: Abrasion present.             Comments: Approximately 1.5 cm skin abrasion over the right knee. No active bleeding or drainage at this time.   Neurological:      General: No focal deficit present.      Mental Status: She is alert and oriented to person, place, and time. Mental status is at baseline.      Comments: CN grossly intact on visualization. No focal neurologic deficits noted on exam.  5/5 strength in all extremities. Neurovascularly intact with normal sensation and motor function.             ED Medications  Medications   tetanus-diphtheria-acellular pertussis (BOOSTRIX) IM injection 0.5 mL (0.5 mL Intramuscular Given 6/9/24 8846)       Diagnostic Studies  Results Reviewed       None                   XR hand 3+ views LEFT   Final Result by Hina Gleason MD (06/10 0911)      No acute osseous abnormality.      Diffuse permeative appearance of mineralization of the hand and wrist. Diagnostic considerations include disuse osteopenia and myeloproliferative disorder such as myeloma.      The study was marked in EPIC for significant notification.      Workstation performed: [id number]         XR wrist 3+ views LEFT   Final Result by Hina Gleason MD (06/10 0913)      No acute osseous abnormality.            Workstation performed: [id number]               Procedures  Procedures      ED Course                                       Medical Decision Making  79-year-old female with extensive PMH including HTN, HLD, DM, and GERD who presented to the ED following mechanical injury that happened this morning.  Patient's imaging was obtained and reviewed by the ED provider.  Upon examination at bedside patient was noted to have mild tenderness to palpation of the left posterior wrist over the ulnar aspect along with in the anatomical snuffbox.  Patient was also noted to have a skin abrasion over the right knee.  Patient's left hand  and wrist images showed no acute osseous abnormality however, the hand showed diffuse permeative appearance of mineralization as per radiology.  While in the emergency department, patient was updated on her tetanus booster and an Ace bandage was applied to her left wrist for support and compression.  Through shared decision making to the patient and the provider, the patient was planned for discharge.  Patient was advised to follow-up outpatient with her PCP.  Patient was also instructed to return to the ED if her symptoms worsen including but not limited to inability to use her left wrist, increasing severity of pain, increased erythema or edema, fever, chills, nausea or vomiting, or changes in her behavior.    Amount and/or Complexity of Data Reviewed  Radiology: ordered.    Risk  Prescription drug management.          Disposition  Final diagnoses:   Fall   Sprain of left wrist, initial encounter     Time reflects when diagnosis was documented in both MDM as applicable and the Disposition within this note       Time User Action Codes Description Comment    6/9/2024  5:04 PM Mahin Sales [W19.XXXA] Fall     6/9/2024  5:04 PM Mahin Sales [S63.502A] Sprain of left wrist, initial encounter           ED Disposition       ED Disposition   Discharge    Condition   Stable    Date/Time   Sun Jun 9, 2024  5:04 PM    Comment   Amanda Naik discharge to home/self care.                   Follow-up Information       Follow up With Specialties Details Why Contact Info Additional Information    Kimberly Dawkins, DO Family Medicine Call  As needed [facility] [address] [phone]       [facility] Emergency Medicine Go to  If symptoms worsen [facility] [address] [phone] [facility], [facility] [place], [place], [address]            Discharge Medication List as of  6/9/2024  5:05 PM        CONTINUE these medications which have NOT CHANGED    Details   aspirin 81 MG tablet Take 81 mg by mouth daily., Historical Med      atorvastatin (LIPITOR) 10 mg tablet TAKE 1 TABLET BY MOUTH EVERY DAY, Normal      betamethasone valerate (VALISONE) 0.1 % ointment Apply topically daily, Starting Tue 1/24/2023, Normal      dorzolamide-timolol (COSOPT) 22.3-6.8 MG/ML ophthalmic solution INSTILL 1 DROP INTO BOTH EYES TWICE A DAY, Historical Med      famotidine (PEPCID) 40 MG tablet Take 1 tablet (40 mg total) by mouth daily, Starting Fri 9/1/2023, Until Thu 11/30/2023, Normal      fluorouracil (EFUDEX) 5 % cream Apply 1 application topically as needed , Starting Tue 11/7/2017, Historical Med      gabapentin (NEURONTIN) 400 mg capsule Take 1 capsule (400 mg total) by mouth daily at bedtime, Starting Mon 9/25/2023, Normal      Klor-Con M10 10 MEQ tablet TAKE 1 TABLET (10 MEQ TOTAL) BY MOUTH 2 (TWO) TIMES A DAY FOR 90 DAYS, Normal      losartan (COZAAR) 50 mg tablet TAKE 1 TABLET BY MOUTH EVERY DAY, Normal      metFORMIN (GLUCOPHAGE-XR) 500 mg 24 hr tablet TAKE 1 TABLET BY MOUTH EVERY DAY WITH DINNER, Normal      omeprazole (PriLOSEC) 10 mg delayed release capsule TAKE 1 CAPSULE BY MOUTH EVERY DAY, Normal      triamcinolone (KENALOG) 0.5 % ointment APPLY TO AFFECTED AREA TWICE A DAY, Starting Thu 6/6/2024, Normal      triamterene-hydrochlorothiazide (DYAZIDE) 37.5-25 mg per capsule TAKE 1 CAPSULE BY MOUTH EVERY DAY, Normal      verapamil (CALAN) 40 mg tablet TAKE 1 TABLET BY MOUTH TWICE A DAY, Starting Mon 4/8/2024, Normal           No discharge procedures on file.    PDMP Review       None             ED Provider  Attending physically available and evaluated Amanda Naik. I managed the patient along with the ED Attending.    Electronically Signed by           Mahin Sales MD  06/11/24 2342

## 2024-06-10 NOTE — RESULT ENCOUNTER NOTE
I left a message on her machine to check her MyChart results.  If she has any further questions, she may return my call at [phone], thank you.

## 2024-08-19 ENCOUNTER — HOSPITAL ENCOUNTER (OUTPATIENT)
Dept: RADIOLOGY | Age: 79
Discharge: HOME | End: 2024-08-19
Payer: MEDICARE

## 2024-08-19 VITALS — WEIGHT: 201 LBS | BODY MASS INDEX: 37.95 KG/M2 | HEIGHT: 61 IN

## 2024-08-19 DIAGNOSIS — Z12.31 VISIT FOR SCREENING MAMMOGRAM: ICD-10-CM

## 2024-08-19 PROCEDURE — 77067 SCR MAMMO BI INCL CAD: CPT

## 2024-08-19 PROCEDURE — 77063 BREAST TOMOSYNTHESIS BI: CPT

## 2024-08-27 ENCOUNTER — TELEPHONE (OUTPATIENT)
Dept: FAMILY MEDICINE CLINIC | Facility: CLINIC | Age: 79
End: 2024-08-27

## 2024-08-27 NOTE — TELEPHONE ENCOUNTER
Patient scheduled to see you on 9/5/24, wants to know if any lab work should be done prior to visit.

## 2024-08-28 NOTE — TELEPHONE ENCOUNTER
LVM for patient to know she needs Hemoglobin A1c which can be done in office.  Patient can be reached at [phone]

## 2024-08-29 ENCOUNTER — RA CDI HCC (OUTPATIENT)
Dept: OTHER | Facility: HOSPITAL | Age: 79
End: 2024-08-29

## 2024-08-29 PROBLEM — Z12.31 ENCOUNTER FOR SCREENING MAMMOGRAM FOR MALIGNANT NEOPLASM OF BREAST: Status: RESOLVED | Noted: 2023-03-23 | Resolved: 2024-08-29

## 2024-08-30 DIAGNOSIS — I10 HYPERTENSION, UNSPECIFIED TYPE: ICD-10-CM

## 2024-08-30 RX ORDER — LOSARTAN POTASSIUM 50 MG/1
TABLET ORAL
Qty: 90 TABLET | Refills: 0 | Status: SHIPPED | OUTPATIENT
Start: 2024-08-30

## 2024-09-05 ENCOUNTER — OFFICE VISIT (OUTPATIENT)
Dept: FAMILY MEDICINE CLINIC | Facility: CLINIC | Age: 79
End: 2024-09-05

## 2024-09-05 VITALS
DIASTOLIC BLOOD PRESSURE: 79 MMHG | SYSTOLIC BLOOD PRESSURE: 129 MMHG | HEART RATE: 65 BPM | OXYGEN SATURATION: 95 % | HEIGHT: 61 IN | RESPIRATION RATE: 18 BRPM | TEMPERATURE: 98.7 F | WEIGHT: 195.4 LBS | BODY MASS INDEX: 36.89 KG/M2

## 2024-09-05 DIAGNOSIS — G47.33 OBSTRUCTIVE SLEEP APNEA: ICD-10-CM

## 2024-09-05 DIAGNOSIS — I10 BENIGN ESSENTIAL HYPERTENSION: ICD-10-CM

## 2024-09-05 DIAGNOSIS — M1A.0710 CHRONIC IDIOPATHIC GOUT INVOLVING TOE OF RIGHT FOOT WITHOUT TOPHUS: ICD-10-CM

## 2024-09-05 DIAGNOSIS — E11.9 TYPE 2 DIABETES MELLITUS WITHOUT COMPLICATION, WITHOUT LONG-TERM CURRENT USE OF INSULIN (HCC): Primary | ICD-10-CM

## 2024-09-05 DIAGNOSIS — E53.8 VITAMIN B12 DEFICIENCY: ICD-10-CM

## 2024-09-05 DIAGNOSIS — R53.82 CHRONIC FATIGUE: ICD-10-CM

## 2024-09-05 DIAGNOSIS — M10.071 IDIOPATHIC GOUT OF RIGHT FOOT, UNSPECIFIED CHRONICITY: ICD-10-CM

## 2024-09-05 PROBLEM — J44.9 OSA AND COPD OVERLAP SYNDROME (HCC): Status: RESOLVED | Noted: 2018-10-19 | Resolved: 2024-09-05

## 2024-09-05 LAB — SL AMB POCT HEMOGLOBIN AIC: 6.6 (ref ?–6.5)

## 2024-09-05 PROCEDURE — 83036 HEMOGLOBIN GLYCOSYLATED A1C: CPT | Performed by: FAMILY MEDICINE

## 2024-09-05 PROCEDURE — 99214 OFFICE O/P EST MOD 30 MIN: CPT | Performed by: FAMILY MEDICINE

## 2024-09-05 NOTE — ASSESSMENT & PLAN NOTE
Concern for gout, will check uric acid.  Also discussed low purine diet.  Patient is on hydrochlorothiazide, will follow-up after uric acid levels have resulted.

## 2024-09-05 NOTE — PROGRESS NOTES
Ambulatory Visit  Name: Amanda Naik      : 1945      MRN: [id number]  Encounter Provider: Lexis Dutton MD  Encounter Date: 2024   Encounter department: [facility]    Assessment & Plan   1. Type 2 diabetes mellitus without complication, without long-term current use of insulin (HCC)  Assessment & Plan:    Lab Results   Component Value Date    HGBA1C 6.6 (A) 2024       Well-controlled, continue current regimen of metformin 500 mg daily.  Continue lifestyle modification with diet and exercise  Orders:  -     POCT hemoglobin A1c  2. Benign essential hypertension  Assessment & Plan:  Blood pressure 129/79, within goal.  Continue current regimen of losartan 50 mg daily, triamterene hydrochlorothiazide, verapamil 40 mg twice daily  3. Idiopathic gout of right foot, unspecified chronicity  -     Uric acid; Future  4. Chronic fatigue  Assessment & Plan:  Will check TSH, vitamin B12 levels    Orders:  -     TSH, 3rd generation with Free T4 reflex; Future  5. Vitamin B12 deficiency  -     Vitamin B12; Future  6. Obstructive sleep apnea  Assessment & Plan:  Uses CPAP, will be following up with sleep medicine next month  7. Chronic idiopathic gout involving toe of right foot without tophus  Assessment & Plan:  Concern for gout, will check uric acid.  Also discussed low purine diet.  Patient is on hydrochlorothiazide, will follow-up after uric acid levels have resulted.       History of Present Illness     Amanda presented to office for follow-up of diabetes.  She is on metformin 500 mg daily and denies any side effects from the medication.  She does report of fall few months ago which was mechanical in nature.  Patient developed swelling of her right great toe following a fall and is concerned about gout.  She reports she has had similar episodes in the past but has never been diagnosed with gout.  She is also concerned about sensation of needles and pins in her feet and  "is worried about neuropathy.  She reports that she has noticed that this is getting worse lately.  She also has chronic fatigue.        Review of Systems   Constitutional:  Negative for chills and fever.   HENT:  Negative for congestion.    Respiratory:  Negative for shortness of breath.    Cardiovascular:  Negative for chest pain.   Gastrointestinal:  Negative for diarrhea, nausea and vomiting.   Genitourinary:  Negative for difficulty urinating.   Musculoskeletal:  Positive for arthralgias.   Neurological:  Negative for headaches.       Objective     /79 (BP Location: Right arm, Patient Position: Sitting, Cuff Size: Standard)   Pulse 65   Temp 98.7 °F (37.1 °C) (Temporal)   Resp 18   Ht 5' 1\" (1.549 m)   Wt 88.6 kg (195 lb 6.4 oz)   SpO2 95%   BMI 36.92 kg/m²     Physical Exam  Constitutional:       Appearance: She is well-developed.   HENT:      Head: Normocephalic and atraumatic.      Right Ear: External ear normal.      Left Ear: External ear normal.   Eyes:      Conjunctiva/sclera: Conjunctivae normal.      Pupils: Pupils are equal, round, and reactive to light.   Cardiovascular:      Rate and Rhythm: Normal rate and regular rhythm.      Heart sounds: Normal heart sounds. No murmur heard.     No friction rub.   Pulmonary:      Effort: Pulmonary effort is normal. No respiratory distress.      Breath sounds: Normal breath sounds. No wheezing or rales.   Musculoskeletal:         General: Normal range of motion.      Cervical back: Normal range of motion and neck supple.   Skin:     General: Skin is warm and dry.   Neurological:      Mental Status: She is alert and oriented to person, place, and time.       Administrative Statements     "

## 2024-09-05 NOTE — ASSESSMENT & PLAN NOTE
Lab Results   Component Value Date    HGBA1C 6.6 (A) 09/05/2024       Well-controlled, continue current regimen of metformin 500 mg daily.  Continue lifestyle modification with diet and exercise

## 2024-09-05 NOTE — ASSESSMENT & PLAN NOTE
Blood pressure 129/79, within goal.  Continue current regimen of losartan 50 mg daily, triamterene hydrochlorothiazide, verapamil 40 mg twice daily

## 2024-09-08 DIAGNOSIS — I10 ESSENTIAL HYPERTENSION: ICD-10-CM

## 2024-09-09 RX ORDER — TRIAMTERENE AND HYDROCHLOROTHIAZIDE 37.5; 25 MG/1; MG/1
CAPSULE ORAL
Qty: 90 CAPSULE | Refills: 1 | Status: SHIPPED | OUTPATIENT
Start: 2024-09-09

## 2024-09-13 ENCOUNTER — TELEPHONE (OUTPATIENT)
Dept: SLEEP CENTER | Facility: CLINIC | Age: 79
End: 2024-09-13

## 2024-09-13 DIAGNOSIS — G47.33 OSA (OBSTRUCTIVE SLEEP APNEA): Primary | ICD-10-CM

## 2024-09-13 LAB

## 2024-09-13 NOTE — TELEPHONE ENCOUNTER
Rx for replacement CPAP and most recent face to face note from 9/25/23 sent to [facility] via [facility].

## 2024-09-13 NOTE — TELEPHONE ENCOUNTER
Received call from patient who states her CPAP machine is more than 5 years old and recently started emitting a smokey smell.  She has a follow up appointment with Dr. Carmona on 10/30/24 but doesn't want to wait until then to request a Rx.  She'd like to get  anew CPAP as soon as possible.   Patient specifically would like a ResMed Airsense 11.  Patient aware that her compliance follow up may need to be rescehduled out further depending on when we obtain the Rx for CPAP.  Verbalized understanding.     Dr. Carmona, please write Rx for replacement CPAP [id number] with nasal cushions.  Patient specifically requests ResMed Airsense 11 so please include this on Rx if agreeable.

## 2024-09-18 LAB
DME PARACHUTE DELIVERY DATE EXPECTED: NORMAL
DME PARACHUTE DELIVERY DATE REQUESTED: NORMAL
DME PARACHUTE DELIVERY NOTE: NORMAL
DME PARACHUTE ITEM DESCRIPTION: NORMAL
DME PARACHUTE ORDER STATUS: NORMAL
DME PARACHUTE SUPPLIER NAME: NORMAL
DME PARACHUTE SUPPLIER PHONE: NORMAL

## 2024-09-20 DIAGNOSIS — E11.9 TYPE 2 DIABETES MELLITUS WITHOUT COMPLICATION, WITHOUT LONG-TERM CURRENT USE OF INSULIN (HCC): ICD-10-CM

## 2024-09-20 RX ORDER — METFORMIN HCL 500 MG
TABLET, EXTENDED RELEASE 24 HR ORAL
Qty: 90 TABLET | Refills: 1 | Status: SHIPPED | OUTPATIENT
Start: 2024-09-20

## 2024-09-23 LAB

## 2024-10-02 ENCOUNTER — LAB (OUTPATIENT)
Dept: LAB | Facility: CLINIC | Age: 79
End: 2024-10-02
Payer: MEDICARE

## 2024-10-02 DIAGNOSIS — R53.82 CHRONIC FATIGUE: ICD-10-CM

## 2024-10-02 DIAGNOSIS — E53.8 VITAMIN B12 DEFICIENCY: ICD-10-CM

## 2024-10-02 DIAGNOSIS — M10.071 IDIOPATHIC GOUT OF RIGHT FOOT, UNSPECIFIED CHRONICITY: ICD-10-CM

## 2024-10-02 LAB
TSH SERPL DL<=0.05 MIU/L-ACNC: 0.7 UIU/ML (ref 0.45–4.5)
URATE SERPL-MCNC: 9.2 MG/DL (ref 2–7.5)
VIT B12 SERPL-MCNC: 851 PG/ML (ref 180–914)

## 2024-10-02 PROCEDURE — 84550 ASSAY OF BLOOD/URIC ACID: CPT

## 2024-10-02 PROCEDURE — 84443 ASSAY THYROID STIM HORMONE: CPT

## 2024-10-02 PROCEDURE — 82607 VITAMIN B-12: CPT

## 2024-10-02 PROCEDURE — 36415 COLL VENOUS BLD VENIPUNCTURE: CPT

## 2024-10-04 DIAGNOSIS — M54.81 BILATERAL OCCIPITAL NEURALGIA: ICD-10-CM

## 2024-10-04 DIAGNOSIS — G44.85 STABBING HEADACHE: ICD-10-CM

## 2024-10-04 DIAGNOSIS — I10 BENIGN ESSENTIAL HYPERTENSION: ICD-10-CM

## 2024-10-04 RX ORDER — POTASSIUM CHLORIDE 750 MG/1
TABLET, EXTENDED RELEASE ORAL
Qty: 180 TABLET | Refills: 1 | Status: SHIPPED | OUTPATIENT
Start: 2024-10-04

## 2024-10-04 RX ORDER — VERAPAMIL HYDROCHLORIDE 40 MG/1
40 TABLET ORAL 2 TIMES DAILY
Qty: 180 TABLET | Refills: 1 | Status: SHIPPED | OUTPATIENT
Start: 2024-10-04

## 2024-10-07 DIAGNOSIS — N89.8 VAGINAL ITCHING: ICD-10-CM

## 2024-10-07 DIAGNOSIS — I10 HYPERTENSION, UNSPECIFIED TYPE: ICD-10-CM

## 2024-10-07 DIAGNOSIS — K21.9 GASTROESOPHAGEAL REFLUX DISEASE WITHOUT ESOPHAGITIS: ICD-10-CM

## 2024-10-07 DIAGNOSIS — G25.81 RESTLESS LEGS SYNDROME: ICD-10-CM

## 2024-10-07 RX ORDER — GABAPENTIN 400 MG/1
400 CAPSULE ORAL
Qty: 90 CAPSULE | Refills: 0 | Status: SHIPPED | OUTPATIENT
Start: 2024-10-07

## 2024-10-07 RX ORDER — TRIAMCINOLONE ACETONIDE 5 MG/G
1 OINTMENT TOPICAL 2 TIMES DAILY
Qty: 15 G | Refills: 0 | Status: SHIPPED | OUTPATIENT
Start: 2024-10-07

## 2024-10-07 RX ORDER — OMEPRAZOLE 10 MG/1
CAPSULE, DELAYED RELEASE ORAL
Qty: 90 CAPSULE | Refills: 1 | Status: SHIPPED | OUTPATIENT
Start: 2024-10-07

## 2024-10-07 RX ORDER — LOSARTAN POTASSIUM 50 MG/1
TABLET ORAL
Qty: 90 TABLET | Refills: 0 | Status: SHIPPED | OUTPATIENT
Start: 2024-10-07

## 2024-10-07 NOTE — TELEPHONE ENCOUNTER
Last office visit 9/25/2023.  Annual appointment needed to be rescheduled due to provider schedule.  Next office visit 2/26/2025, added to wait list for sooner appointment.    Dr. Carmona, please review Rx request and sign if appropriate.  Thank you.

## 2024-10-09 NOTE — PROGRESS NOTES
Review of Systems      Genitourinary none   Cardiology none   Gastrointestinal none   Neurology balance problems   Constitutional none   Integumentary none   Psychiatry none   Musculoskeletal none   Pulmonary none   ENT throat clearing   Endocrine none   Hematological none I attest my time as attending is greater than 50% of the total combined time spent on qualifying patient care activities by the PA/NP and attending.

## 2024-10-22 ENCOUNTER — OFFICE VISIT (OUTPATIENT)
Dept: FAMILY MEDICINE CLINIC | Facility: CLINIC | Age: 79
End: 2024-10-22

## 2024-10-22 VITALS
DIASTOLIC BLOOD PRESSURE: 86 MMHG | BODY MASS INDEX: 37.3 KG/M2 | WEIGHT: 197.4 LBS | OXYGEN SATURATION: 97 % | SYSTOLIC BLOOD PRESSURE: 142 MMHG | HEART RATE: 77 BPM | TEMPERATURE: 97.6 F | RESPIRATION RATE: 18 BRPM

## 2024-10-22 DIAGNOSIS — I10 HYPERTENSION, UNSPECIFIED TYPE: Primary | ICD-10-CM

## 2024-10-22 DIAGNOSIS — Z23 ENCOUNTER FOR IMMUNIZATION: ICD-10-CM

## 2024-10-22 DIAGNOSIS — E79.0 ELEVATED URIC ACID IN BLOOD: ICD-10-CM

## 2024-10-22 PROCEDURE — G0008 ADMIN INFLUENZA VIRUS VAC: HCPCS | Performed by: FAMILY MEDICINE

## 2024-10-22 PROCEDURE — 99213 OFFICE O/P EST LOW 20 MIN: CPT | Performed by: FAMILY MEDICINE

## 2024-10-22 PROCEDURE — 90662 IIV NO PRSV INCREASED AG IM: CPT | Performed by: FAMILY MEDICINE

## 2024-10-22 RX ORDER — LOSARTAN POTASSIUM 100 MG/1
100 TABLET ORAL DAILY
Qty: 90 TABLET | Refills: 1 | Status: SHIPPED | OUTPATIENT
Start: 2024-10-22

## 2024-10-22 NOTE — PROGRESS NOTES
Ambulatory Visit  Name: Amanda Naik      : 1945      MRN: [id number]  Encounter Provider: Lexis Dutton MD  Encounter Date: 10/22/2024   Encounter department: [facility]    Assessment & Plan  Hypertension, unspecified type  Blood pressure 142/86 today, within goal.  Patient is currently on losartan 50 mg daily, triamterene hydrochlorothiazide combination, verapamil 40 mg twice daily  Discussed hydrochlorothiazide could be contributing to elevated uric acid levels, patient agreeable on discontinuing the medication and increasing losartan to 100 mg daily.  Patient will follow-up in 2 weeks for blood pressure recheck.  Verapamil was started years ago for occipital neuralgia, patient has not had any headaches 3 to 4 years, eventually plan to switch verapamil to amlodipine if blood pressure remains uncontrolled.  Orders:    losartan (COZAAR) 100 MG tablet; Take 1 tablet (100 mg total) by mouth daily    Elevated uric acid in blood  Recent labs shows uric acid levels of 9.2.  Patient did have 2 episodes of gout this year which resolved with NSAIDs.  She is currently on hydrochlorothiazide triamterene combination which could be causing elevated uric acid levels.  Purine diet and detail.  Cussed discontinuing hydrochlorothiazide triamterene combination and increasing the losartan dose.  Patient is agreeable.  Advised to keep blood pressure log and follow-up in 2 weeks.  Orders:    Uric acid; Future    Encounter for immunization    Orders:    influenza vaccine, high-dose, PF 0.5 mL (Fluzone High Dose)       History of Present Illness     Amanda presented to office for follow-up of lab results.  She was found to have uric acid of 9.2.  She reports 2 episodes of gout this year with swelling and redness of her ankle joint.  She has been taking the low purine diet to prevent gout episodes.          Review of Systems   Constitutional:  Negative for chills and fever.   HENT:  Negative  for congestion.    Respiratory:  Negative for shortness of breath.    Cardiovascular:  Negative for chest pain.   Gastrointestinal:  Negative for diarrhea, nausea and vomiting.   Genitourinary:  Negative for difficulty urinating.   Musculoskeletal:  Positive for arthralgias.   Neurological:  Negative for headaches.           Objective     /86   Pulse 77   Temp 97.6 °F (36.4 °C)   Resp 18   Wt 89.5 kg (197 lb 6.4 oz)   SpO2 97%   BMI 37.30 kg/m²     Physical Exam  Constitutional:       Appearance: She is well-developed.   HENT:      Head: Normocephalic and atraumatic.      Right Ear: External ear normal.      Left Ear: External ear normal.   Eyes:      Conjunctiva/sclera: Conjunctivae normal.      Pupils: Pupils are equal, round, and reactive to light.   Cardiovascular:      Rate and Rhythm: Normal rate and regular rhythm.      Heart sounds: Normal heart sounds. No murmur heard.     No friction rub.   Pulmonary:      Effort: Pulmonary effort is normal. No respiratory distress.      Breath sounds: Normal breath sounds. No wheezing or rales.   Musculoskeletal:         General: Normal range of motion.      Cervical back: Normal range of motion and neck supple.   Skin:     General: Skin is warm and dry.   Neurological:      Mental Status: She is alert and oriented to person, place, and time.          Yes

## 2024-10-28 ENCOUNTER — OFFICE VISIT (OUTPATIENT)
Dept: FAMILY MEDICINE CLINIC | Facility: CLINIC | Age: 79
End: 2024-10-28

## 2024-10-28 VITALS
HEART RATE: 70 BPM | SYSTOLIC BLOOD PRESSURE: 165 MMHG | TEMPERATURE: 98.8 F | WEIGHT: 199 LBS | DIASTOLIC BLOOD PRESSURE: 83 MMHG | HEIGHT: 61 IN | BODY MASS INDEX: 37.57 KG/M2 | OXYGEN SATURATION: 97 %

## 2024-10-28 DIAGNOSIS — I10 BENIGN ESSENTIAL HYPERTENSION: Primary | ICD-10-CM

## 2024-10-28 PROCEDURE — 99213 OFFICE O/P EST LOW 20 MIN: CPT

## 2024-10-28 PROCEDURE — G2211 COMPLEX E/M VISIT ADD ON: HCPCS

## 2024-10-28 RX ORDER — AMLODIPINE BESYLATE 5 MG/1
5 TABLET ORAL DAILY
Qty: 30 TABLET | Refills: 0 | Status: SHIPPED | OUTPATIENT
Start: 2024-10-28

## 2024-10-28 NOTE — ASSESSMENT & PLAN NOTE
Patient presenting for blood pressure check and blood pressure log review.  BP elevated at home and above goal today, 165/83 in office.  Per PCPs last note, will discontinue verapamil and start amlodipine.  Instructed patient to take second dose of verapamil today; discontinue tomorrow. Amlodipine will start tomorrow morning.  - start amlodipine 5mg   - d/c verapamil  - f/u in 1 week for BP check    Orders:    amLODIPine (NORVASC) 5 mg tablet; Take 1 tablet (5 mg total) by mouth daily

## 2024-10-28 NOTE — PROGRESS NOTES
"Ambulatory Visit  Name: Amanda Naik      : 1945      MRN: [id number]  Encounter Provider: Blayne Hurley MD  Encounter Date: 10/28/2024   Encounter department: [facility]    Assessment & Plan  Benign essential hypertension  Patient presenting for blood pressure check and blood pressure log review.  BP elevated at home and above goal today, 165/83 in office.  Per PCPs last note, will discontinue verapamil and start amlodipine.  Instructed patient to take second dose of verapamil today; discontinue tomorrow. Amlodipine will start tomorrow morning.  - start amlodipine 5mg   - d/c verapamil  - f/u in 1 week for BP check    Orders:    amLODIPine (NORVASC) 5 mg tablet; Take 1 tablet (5 mg total) by mouth daily       History of Present Illness     71-year-old female with past medical history of primary hypertension presenting for pressure check and follow-up after recent antihypertensive regimen change.  Her blood pressure has been elevated at home and it is elevated today in the office.  She is concerned that the recent change may not be adequate and would like to follow-up on her PCPs alternative plan they discussed previously.  She is doing well, no acute complaints today no new symptoms.        History obtained from : patient  Review of Systems   Constitutional: Negative.    HENT: Negative.     Respiratory: Negative.     Cardiovascular: Negative.    Musculoskeletal: Negative.    Neurological: Negative.    Psychiatric/Behavioral: Negative.     All other systems reviewed and are negative.    Objective     /83 (BP Location: Left arm, Patient Position: Sitting, Cuff Size: Large)   Pulse 70   Temp 98.8 °F (37.1 °C) (Temporal)   Ht 5' 1\" (1.549 m)   Wt 90.3 kg (199 lb)   SpO2 97%   BMI 37.60 kg/m²     Physical Exam  Vitals reviewed.   Constitutional:       General: She is not in acute distress.     Appearance: She is well-developed. She is not " ill-appearing.   HENT:      Head: Normocephalic.      Right Ear: External ear normal.      Left Ear: External ear normal.      Nose: Nose normal.      Mouth/Throat:      Mouth: Mucous membranes are moist.   Eyes:      Extraocular Movements: Extraocular movements intact.   Cardiovascular:      Rate and Rhythm: Normal rate and regular rhythm.      Heart sounds: No murmur heard.  Pulmonary:      Effort: Pulmonary effort is normal. No respiratory distress.   Abdominal:      General: There is no distension.   Musculoskeletal:         General: No swelling.      Cervical back: Normal range of motion.   Skin:     Capillary Refill: Capillary refill takes less than 2 seconds.      Findings: No rash.   Neurological:      Mental Status: She is alert and oriented to person, place, and time.   Psychiatric:         Mood and Affect: Mood normal.         Behavior: Behavior normal.       Blayne Hurley MD

## 2024-10-29 ENCOUNTER — RA CDI HCC (OUTPATIENT)
Dept: OTHER | Facility: HOSPITAL | Age: 79
End: 2024-10-29

## 2024-11-04 ENCOUNTER — OFFICE VISIT (OUTPATIENT)
Dept: FAMILY MEDICINE CLINIC | Facility: CLINIC | Age: 79
End: 2024-11-04

## 2024-11-04 VITALS
TEMPERATURE: 98.4 F | HEART RATE: 56 BPM | WEIGHT: 199 LBS | BODY MASS INDEX: 37.57 KG/M2 | SYSTOLIC BLOOD PRESSURE: 171 MMHG | DIASTOLIC BLOOD PRESSURE: 77 MMHG | RESPIRATION RATE: 18 BRPM | OXYGEN SATURATION: 97 % | HEIGHT: 61 IN

## 2024-11-04 DIAGNOSIS — I10 BENIGN ESSENTIAL HYPERTENSION: Primary | ICD-10-CM

## 2024-11-04 PROCEDURE — G2211 COMPLEX E/M VISIT ADD ON: HCPCS | Performed by: FAMILY MEDICINE

## 2024-11-04 PROCEDURE — 99213 OFFICE O/P EST LOW 20 MIN: CPT | Performed by: FAMILY MEDICINE

## 2024-11-05 NOTE — PROGRESS NOTES
Ambulatory Visit  Name: Amanda Naik      : 1945      MRN: [id number]  Encounter Provider: Blayne Hurley MD  Encounter Date: 2024   Encounter department: [facility]    Assessment & Plan  Benign essential hypertension  Patient presenting for blood pressure recheck after recent medication adjustment.  During her last visit we substituted her verapamil for a moderate dose of amlodipine.  Over the last week, her blood pressure has shown only minimal signs of improvement.  Her morning blood pressure readings are near goal but her evening readings are above goal.  Her blood pressure is above goal in the office today at 171/77.  She denies any symptoms including headaches, visual changes, palpitations, chest pain, shortness of breath.  She also denies any adverse effects that could be related to the new medication including lower extremity edema.      We discussed multiple options about how to proceed with her antihypertensive regimen.  After shared a decision-making conversation we decided to keep her current blood pressure medications unchanged but adjust the timing of her amlodipine dose.  She will now take her 5 mg of amlodipine with her afternoon medications as opposed to her morning medications.  - Continue amlodipine 5 mg once daily; adjust timing of medication to afternoon  - Continue twice daily home blood pressure monitoring  - If blood pressure remains elevated after timing adjustment, patient agreeable to increase dose of amlodipine  - Return in 1 week for blood pressure check and blood pressure log review                History of Present Illness     71-year-old female with past medical history of essential hypertension presenting for blood pressure check and blood pressure log review.  Last week we adjusted her antihypertensive regimen by substituting 5mg of amlodipine daily for her previous verapamil dose.  Of note, the verapamil was originally  "used for headaches.  Today, she feels well overall and denies any symptoms.  She also denies any medication associated adverse effects.        History obtained from : patient  Review of Systems   Constitutional: Negative.    Eyes:  Negative for visual disturbance.   Respiratory:  Negative for shortness of breath.    Cardiovascular:  Negative for chest pain, palpitations and leg swelling.   Gastrointestinal:  Negative for nausea.   Neurological:  Negative for dizziness, syncope, weakness, light-headedness and headaches.       Objective     BP (!) 171/77 (BP Location: Right arm, Patient Position: Sitting, Cuff Size: Standard)   Pulse 56   Temp 98.4 °F (36.9 °C) (Temporal)   Resp 18   Ht 5' 1\" (1.549 m)   Wt 90.3 kg (199 lb)   SpO2 97%   BMI 37.60 kg/m²     Physical Exam  Vitals reviewed.   Constitutional:       General: She is not in acute distress.     Appearance: Normal appearance. She is well-developed. She is not ill-appearing.   HENT:      Head: Normocephalic and atraumatic.      Right Ear: External ear normal.      Left Ear: External ear normal.      Nose: Nose normal.      Mouth/Throat:      Mouth: Mucous membranes are moist.      Pharynx: Oropharynx is clear.   Eyes:      General: No scleral icterus.     Extraocular Movements: Extraocular movements intact.   Cardiovascular:      Rate and Rhythm: Normal rate and regular rhythm.      Heart sounds: Normal heart sounds. No murmur heard.  Pulmonary:      Effort: Pulmonary effort is normal. No respiratory distress.      Breath sounds: Normal breath sounds. No rales.   Abdominal:      General: There is no distension.   Musculoskeletal:         General: No swelling or signs of injury.      Cervical back: Normal range of motion.      Right lower leg: No edema.      Left lower leg: No edema.   Lymphadenopathy:      Cervical: No cervical adenopathy.   Skin:     Capillary Refill: Capillary refill takes less than 2 seconds.      Findings: No rash.   Neurological:    "   Mental Status: She is alert and oriented to person, place, and time.      Sensory: No sensory deficit.      Motor: No weakness.      Gait: Gait normal.   Psychiatric:         Mood and Affect: Mood normal.         Behavior: Behavior normal.       Blayne Hurley MD

## 2024-11-05 NOTE — ASSESSMENT & PLAN NOTE
Patient presenting for blood pressure recheck after recent medication adjustment.  During her last visit we substituted her verapamil for a moderate dose of amlodipine.  Over the last week, her blood pressure has shown only minimal signs of improvement.  Her morning blood pressure readings are near goal but her evening readings are above goal.  Her blood pressure is above goal in the office today at 171/77.  She denies any symptoms including headaches, visual changes, palpitations, chest pain, shortness of breath.  She also denies any adverse effects that could be related to the new medication including lower extremity edema.      We discussed multiple options about how to proceed with her antihypertensive regimen.  After shared a decision-making conversation we decided to keep her current blood pressure medications unchanged but adjust the timing of her amlodipine dose.  She will now take her 5 mg of amlodipine with her afternoon medications as opposed to her morning medications.  - Continue amlodipine 5 mg once daily; adjust timing of medication to afternoon  - Continue twice daily home blood pressure monitoring  - If blood pressure remains elevated after timing adjustment, patient agreeable to increase dose of amlodipine  - Return in 1 week for blood pressure check and blood pressure log review

## 2024-11-11 ENCOUNTER — OFFICE VISIT (OUTPATIENT)
Dept: FAMILY MEDICINE CLINIC | Facility: CLINIC | Age: 79
End: 2024-11-11

## 2024-11-11 VITALS
TEMPERATURE: 98.6 F | WEIGHT: 197.6 LBS | HEART RATE: 60 BPM | DIASTOLIC BLOOD PRESSURE: 80 MMHG | HEIGHT: 61 IN | BODY MASS INDEX: 37.31 KG/M2 | OXYGEN SATURATION: 97 % | SYSTOLIC BLOOD PRESSURE: 151 MMHG | RESPIRATION RATE: 18 BRPM

## 2024-11-11 DIAGNOSIS — I10 BENIGN ESSENTIAL HYPERTENSION: ICD-10-CM

## 2024-11-11 PROCEDURE — G2211 COMPLEX E/M VISIT ADD ON: HCPCS | Performed by: FAMILY MEDICINE

## 2024-11-11 PROCEDURE — 99213 OFFICE O/P EST LOW 20 MIN: CPT | Performed by: FAMILY MEDICINE

## 2024-11-11 RX ORDER — AMLODIPINE BESYLATE 5 MG/1
5 TABLET ORAL DAILY
Qty: 90 TABLET | Refills: 0 | Status: SHIPPED | OUTPATIENT
Start: 2024-11-11

## 2024-11-11 NOTE — PROGRESS NOTES
Ambulatory Visit  Name: Amanda Naik      : 1945      MRN: [id number]  Encounter Provider: Blayne Hurley MD  Encounter Date: 2024   Encounter department: [facility]    Assessment & Plan  Benign essential hypertension  Patient's /80 today.  Reviewed blood pressure log over the last week.  Both a.m. and p.m. blood pressures have improved.  Only 2 systolic blood pressures above 160 out of a total 18 measurements. Both were prior to the timing adjustment of her amlodipine.      After changing the amlodipine timing, six systolic blood pressures were above 150 and above two 155.  She denies any symptoms including chest pain, shortness of breath, visual disturbances. We discussed options going forward in regard to further adjustments.     After a shared decision-making process we decided to keep her current antihypertensive regimen unchanged. She has a follow-up appointment in 3 weeks with her PCP and will continue to measure blood pressure at home.  - Continue current BP regimen, no changes made today  - Follow-up previously scheduled (2024)  - 90-day refill of amlodipine sent to preferred pharmacy  - Reviewed contingency plan if her BP rises above goal; consider splitting losartan dose to 50mg BID vs 100mg qd              Orders:    amLODIPine (NORVASC) 5 mg tablet; Take 1 tablet (5 mg total) by mouth daily       History of Present Illness     71-year-old female with past medical history of hypertension presenting for follow-up, blood pressure check, and blood pressure log review.  Last week we adjusted her blood pressure regimen and changed the timing of her amlodipine from morning to evening. She has continued to measure her BP twice daily. There is a clear improvement of her BP after changing the timing of her medication. She has been feeling well overall and her only complaint is a vague, mild frontal headache that has persisted over the last  "several weeks.         History obtained from : patient  Review of Systems   Constitutional: Negative.    Respiratory:  Negative for chest tightness and shortness of breath.    Cardiovascular:  Negative for chest pain and leg swelling.   Neurological:  Positive for headaches (mild, frontal, only happens at night).     Medical History Reviewed by provider this encounter:  Problems           Objective     /80 (BP Location: Left arm, Patient Position: Sitting, Cuff Size: Large)   Pulse 60   Temp 98.6 °F (37 °C) (Temporal)   Resp 18   Ht 5' 1\" (1.549 m)   Wt 89.6 kg (197 lb 9.6 oz)   SpO2 97%   BMI 37.34 kg/m²     GEN: patient alert and oriented x3, pleasant well-appearing, and in no acute distress.   CV: regular rate  PULM: nonlabored respirations  MSK: upper and lower extremities without injury or deformity  Skin: warm, dry, no observable breakdown, wounds, or rashes  Neuro: no appreciable weakness or sensory deficits, speech fluent, face symmetric  Psych: normal affect and judgement, cooperative     Blayne Hurley MD    "

## 2024-11-11 NOTE — ASSESSMENT & PLAN NOTE
Patient's /80 today.  Reviewed blood pressure log over the last week.  Both a.m. and p.m. blood pressures have improved.  Only 2 systolic blood pressures above 160 out of a total 18 measurements. Both were prior to the timing adjustment of her amlodipine.      After changing the amlodipine timing, six systolic blood pressures were above 150 and above two 155.  She denies any symptoms including chest pain, shortness of breath, visual disturbances. We discussed options going forward in regard to further adjustments.     After a shared decision-making process we decided to keep her current antihypertensive regimen unchanged. She has a follow-up appointment in 3 weeks with her PCP and will continue to measure blood pressure at home.  - Continue current BP regimen, no changes made today  - Follow-up previously scheduled (12/5/2024)  - 90-day refill of amlodipine sent to preferred pharmacy  - Reviewed contingency plan if her BP rises above goal; consider splitting losartan dose to 50mg BID vs 100mg qd              Orders:    amLODIPine (NORVASC) 5 mg tablet; Take 1 tablet (5 mg total) by mouth daily

## 2024-12-05 ENCOUNTER — OFFICE VISIT (OUTPATIENT)
Dept: FAMILY MEDICINE CLINIC | Facility: CLINIC | Age: 79
End: 2024-12-05

## 2024-12-05 VITALS
BODY MASS INDEX: 36.44 KG/M2 | TEMPERATURE: 97.6 F | SYSTOLIC BLOOD PRESSURE: 160 MMHG | DIASTOLIC BLOOD PRESSURE: 77 MMHG | OXYGEN SATURATION: 97 % | HEIGHT: 61 IN | WEIGHT: 193 LBS | HEART RATE: 66 BPM

## 2024-12-05 DIAGNOSIS — I10 BENIGN ESSENTIAL HYPERTENSION: Primary | ICD-10-CM

## 2024-12-05 DIAGNOSIS — M1A.0710 CHRONIC IDIOPATHIC GOUT INVOLVING TOE OF RIGHT FOOT WITHOUT TOPHUS: ICD-10-CM

## 2024-12-05 DIAGNOSIS — E11.9 TYPE 2 DIABETES MELLITUS WITHOUT COMPLICATION, WITHOUT LONG-TERM CURRENT USE OF INSULIN (HCC): ICD-10-CM

## 2024-12-05 PROCEDURE — 99214 OFFICE O/P EST MOD 30 MIN: CPT | Performed by: FAMILY MEDICINE

## 2024-12-05 PROCEDURE — G2211 COMPLEX E/M VISIT ADD ON: HCPCS | Performed by: FAMILY MEDICINE

## 2024-12-05 RX ORDER — AMLODIPINE BESYLATE 2.5 MG/1
2.5 TABLET ORAL DAILY
Qty: 30 TABLET | Refills: 5 | Status: SHIPPED | OUTPATIENT
Start: 2024-12-05

## 2024-12-05 NOTE — ASSESSMENT & PLAN NOTE
Patient had 2 episodes of gout earlier this year, recent uric acid level was 9.2, will recheck uric acid now that hydrochlorothiazide has been discontinued.  Advised patient to continue with low purine diet.

## 2024-12-05 NOTE — ASSESSMENT & PLAN NOTE
Pressure 160/77 today, on recheck manually still the same.  Patient is currently on amlodipine 5 mg and losartan 100 mg daily.  Blood pressure less than 150/90  He also has a blood pressure logs from home, which look great except for 1 reading that is 158/78.  Otherwise the blood pressure readings are between 120s to 130s over 70s.  Patient does have high blood pressure visits during doctors visits.  I think she will benefit from addition of amlodipine 2.5 mg at night.  Will have her follow-up in 2 months for blood pressure recheck and annual wellness visit.   Orders:    CBC and differential; Future    Comprehensive metabolic panel; Future    Hemoglobin A1C; Future    Lipid panel; Future    Albumin / creatinine urine ratio; Future    amLODIPine (NORVASC) 2.5 mg tablet; Take 1 tablet (2.5 mg total) by mouth daily

## 2024-12-05 NOTE — PROGRESS NOTES
Name: Amanda Naik      : 1945      MRN: [id number]  Encounter Provider: Lexis Dutton MD  Encounter Date: 2024   Encounter department: [facility] [place]  :  Assessment & Plan  Benign essential hypertension  Pressure 160/77 today, on recheck manually still the same.  Patient is currently on amlodipine 5 mg and losartan 100 mg daily.  Blood pressure less than 150/90  He also has a blood pressure logs from home, which look great except for 1 reading that is 158/78.  Otherwise the blood pressure readings are between 120s to 130s over 70s.  Patient does have high blood pressure visits during doctors visits.  I think she will benefit from addition of amlodipine 2.5 mg at night.  Will have her follow-up in 2 months for blood pressure recheck and annual wellness visit.   Orders:    CBC and differential; Future    Comprehensive metabolic panel; Future    Hemoglobin A1C; Future    Lipid panel; Future    Albumin / creatinine urine ratio; Future    amLODIPine (NORVASC) 2.5 mg tablet; Take 1 tablet (2.5 mg total) by mouth daily    Type 2 diabetes mellitus without complication, without long-term current use of insulin ([facility])    Lab Results   Component Value Date    HGBA1C 6.6 (A) 2024     Hemoglobin A1c was within goal, continue metformin 500 mg daily.  Check CMP, urine for microalbumin, hemoglobin A1c.  Patient would need a diabetic foot exam in the next visit.       Chronic idiopathic gout involving toe of right foot without tophus  Patient had 2 episodes of gout earlier this year, recent uric acid level was 9.2, will recheck uric acid now that hydrochlorothiazide has been discontinued.  Advised patient to continue with low purine diet.              History of Present Illness     Amanda office for follow-up of hypertension.  She denies any episodes of gout recently.  She used to be on hydrochlorothiazide and verapamil, the regimen was changed to amlodipine and losartan  "recently.  She denies any side effects from these medications.  She has no acute concerns today.      Review of Systems   Constitutional:  Negative for chills and fever.   HENT:  Negative for congestion.    Respiratory:  Negative for shortness of breath.    Cardiovascular:  Negative for chest pain.   Gastrointestinal:  Negative for diarrhea, nausea and vomiting.   Genitourinary:  Negative for difficulty urinating.   Neurological:  Negative for headaches.          Objective   /77 (BP Location: Right arm, Patient Position: Sitting, Cuff Size: Large)   Pulse 66   Temp 97.6 °F (36.4 °C) (Temporal)   Ht 5' 1\" (1.549 m)   Wt 87.5 kg (193 lb)   SpO2 97%   BMI 36.47 kg/m²      Physical Exam  Constitutional:       Appearance: She is well-developed.   HENT:      Head: Normocephalic and atraumatic.      Right Ear: External ear normal.      Left Ear: External ear normal.   Eyes:      Conjunctiva/sclera: Conjunctivae normal.      Pupils: Pupils are equal, round, and reactive to light.   Cardiovascular:      Rate and Rhythm: Normal rate and regular rhythm.      Heart sounds: Normal heart sounds. No murmur heard.     No friction rub.   Pulmonary:      Effort: Pulmonary effort is normal. No respiratory distress.      Breath sounds: Normal breath sounds. No wheezing or rales.   Abdominal:      Palpations: Abdomen is soft.   Musculoskeletal:         General: Normal range of motion.      Cervical back: Normal range of motion and neck supple.   Skin:     General: Skin is warm and dry.   Neurological:      Mental Status: She is alert and oriented to person, place, and time.         "

## 2024-12-05 NOTE — ASSESSMENT & PLAN NOTE
Lab Results   Component Value Date    HGBA1C 6.6 (A) 09/05/2024     Hemoglobin A1c was within goal, continue metformin 500 mg daily.  Check CMP, urine for microalbumin, hemoglobin A1c.  Patient would need a diabetic foot exam in the next visit.

## 2024-12-31 ENCOUNTER — OFFICE VISIT (OUTPATIENT)
Dept: FAMILY MEDICINE CLINIC | Facility: CLINIC | Age: 79
End: 2024-12-31

## 2024-12-31 VITALS
DIASTOLIC BLOOD PRESSURE: 72 MMHG | SYSTOLIC BLOOD PRESSURE: 128 MMHG | TEMPERATURE: 98.1 F | OXYGEN SATURATION: 98 % | HEART RATE: 71 BPM | BODY MASS INDEX: 37 KG/M2 | WEIGHT: 196 LBS | HEIGHT: 61 IN

## 2024-12-31 DIAGNOSIS — I10 BENIGN ESSENTIAL HYPERTENSION: Primary | ICD-10-CM

## 2024-12-31 DIAGNOSIS — Z79.4 TYPE 2 DIABETES MELLITUS WITH STAGE 3A CHRONIC KIDNEY DISEASE, WITH LONG-TERM CURRENT USE OF INSULIN (HCC): ICD-10-CM

## 2024-12-31 DIAGNOSIS — E11.22 TYPE 2 DIABETES MELLITUS WITH STAGE 3A CHRONIC KIDNEY DISEASE, WITH LONG-TERM CURRENT USE OF INSULIN (HCC): ICD-10-CM

## 2024-12-31 DIAGNOSIS — N18.31 TYPE 2 DIABETES MELLITUS WITH STAGE 3A CHRONIC KIDNEY DISEASE, WITH LONG-TERM CURRENT USE OF INSULIN (HCC): ICD-10-CM

## 2024-12-31 PROCEDURE — 99214 OFFICE O/P EST MOD 30 MIN: CPT | Performed by: FAMILY MEDICINE

## 2024-12-31 PROCEDURE — G2211 COMPLEX E/M VISIT ADD ON: HCPCS | Performed by: FAMILY MEDICINE

## 2024-12-31 NOTE — ASSESSMENT & PLAN NOTE
Lab Results   Component Value Date    HGBA1C 6.6 (A) 09/05/2024     Continue metformin 500 mg daily.  Lifestyle modification with diet and exercise.  Foot exam completed today.

## 2024-12-31 NOTE — PROGRESS NOTES
Name: Amanda Naik      : 1945      MRN: [id number]  Encounter Provider: Lexis Dutton MD  Encounter Date: 2024   Encounter department: [facility] [place]  :  Assessment & Plan  Benign essential hypertension  Blood pressure 128/72, within goal.  Patient is currently on Tariq 100 mg daily, amlodipine 5 mg in the morning and 2.5 mg at night.  She noticed swelling of ankles last week, but it is not there today.  On exam trace swelling in right lower extremity and no swelling in left lower extremity.  Shared decision making to continue to monitor at this time and follow-up in February.  Patient will reach out sooner if swelling becomes worse.       Type 2 diabetes mellitus with stage 3a chronic kidney disease, with long-term current use of insulin ([facility])    Lab Results   Component Value Date    HGBA1C 6.6 (A) 2024     Continue metformin 500 mg daily.  Lifestyle modification with diet and exercise.  Foot exam completed today.                History of Present Illness     Amanda for follow-up of hypertension.  She was started on amlodipine 2.5 mg in the evening in addition to 5 mg in morning in the last visit.  She reports her blood pressures have been great at home but she noticed swelling of her ankles a week ago.  Today she reports that the swelling is a lot better.  It is usually worse in the evenings, especially after days of a lot of activity.  But she has not had any difficulty wearing shoes.      Review of Systems   Constitutional:  Negative for chills and fever.   HENT:  Negative for congestion.    Respiratory:  Negative for shortness of breath.    Cardiovascular:  Negative for chest pain.   Gastrointestinal:  Negative for diarrhea, nausea and vomiting.   Genitourinary:  Negative for difficulty urinating.   Neurological:  Negative for headaches.       Objective   /72 (BP Location: Left arm, Patient Position: Sitting, Cuff Size: Standard)   Pulse 71    "Temp 98.1 °F (36.7 °C) (Temporal)   Ht 5' 1\" (1.549 m)   Wt 88.9 kg (196 lb)   SpO2 98%   BMI 37.03 kg/m²      Physical Exam  Constitutional:       Appearance: She is well-developed.   HENT:      Head: Normocephalic and atraumatic.      Right Ear: External ear normal.      Left Ear: External ear normal.   Eyes:      Conjunctiva/sclera: Conjunctivae normal.      Pupils: Pupils are equal, round, and reactive to light.   Cardiovascular:      Rate and Rhythm: Normal rate and regular rhythm.      Pulses: no weak pulses.           Dorsalis pedis pulses are 2+ on the right side and 2+ on the left side.        Posterior tibial pulses are 1+ on the right side and 1+ on the left side.      Heart sounds: Normal heart sounds. No murmur heard.     No friction rub.   Pulmonary:      Effort: Pulmonary effort is normal. No respiratory distress.      Breath sounds: Normal breath sounds. No wheezing or rales.   Musculoskeletal:         General: Normal range of motion.      Cervical back: Normal range of motion and neck supple.      Comments: On exam trace swelling in right lower extremity and no swelling in left lower extremity   Feet:      Right foot:      Skin integrity: No ulcer, skin breakdown, erythema, warmth, callus or dry skin.      Left foot:      Skin integrity: No ulcer, skin breakdown, erythema, warmth, callus or dry skin.   Skin:     General: Skin is warm and dry.   Neurological:      Mental Status: She is alert and oriented to person, place, and time.     Patient's shoes and socks removed.    Right Foot/Ankle   Right Foot Inspection  Skin Exam: skin normal and skin intact. No dry skin, no warmth, no callus, no erythema, no maceration, no abnormal color, no pre-ulcer, no ulcer and no callus.     Toe Exam: ROM and strength within normal limits. Right toe deformity: overriding 5th toe.     Sensory   Proprioception: intact  Monofilament testing: intact    Vascular  Capillary refills: < 3 seconds  The right DP pulse is " 2+. The right PT pulse is 1+.     Left Foot/Ankle  Left Foot Inspection  Skin Exam: skin normal and skin intact. No dry skin, no warmth, no erythema, no maceration, normal color, no pre-ulcer, no ulcer and no callus.     Toe Exam: ROM and strength within normal limits and left toe deformity.     Sensory   Proprioception: intact  Monofilament testing: intact    Vascular  Capillary refills: < 3 seconds  The left DP pulse is 2+. The left PT pulse is 1+.     Assign Risk Category  No deformity present  No loss of protective sensation  No weak pulses  Risk: 0

## 2024-12-31 NOTE — ASSESSMENT & PLAN NOTE
Blood pressure 128/72, within goal.  Patient is currently on Tariq 100 mg daily, amlodipine 5 mg in the morning and 2.5 mg at night.  She noticed swelling of ankles last week, but it is not there today.  On exam trace swelling in right lower extremity and no swelling in left lower extremity.  Shared decision making to continue to monitor at this time and follow-up in February.  Patient will reach out sooner if swelling becomes worse.

## 2025-01-01 DIAGNOSIS — E78.5 HYPERLIPIDEMIA, UNSPECIFIED HYPERLIPIDEMIA TYPE: ICD-10-CM

## 2025-01-02 RX ORDER — ATORVASTATIN CALCIUM 10 MG/1
10 TABLET, FILM COATED ORAL DAILY
Qty: 90 TABLET | Refills: 3 | Status: SHIPPED | OUTPATIENT
Start: 2025-01-02

## 2025-01-04 DIAGNOSIS — E11.9 TYPE 2 DIABETES MELLITUS WITHOUT COMPLICATION, WITHOUT LONG-TERM CURRENT USE OF INSULIN (HCC): ICD-10-CM

## 2025-01-04 DIAGNOSIS — I10 BENIGN ESSENTIAL HYPERTENSION: ICD-10-CM

## 2025-01-06 RX ORDER — METFORMIN HYDROCHLORIDE 500 MG/1
500 TABLET, EXTENDED RELEASE ORAL
Qty: 90 TABLET | Refills: 1 | Status: SHIPPED | OUTPATIENT
Start: 2025-01-06

## 2025-01-06 RX ORDER — AMLODIPINE BESYLATE 5 MG/1
5 TABLET ORAL DAILY
Qty: 90 TABLET | Refills: 0 | Status: SHIPPED | OUTPATIENT
Start: 2025-01-06

## 2025-01-09 DIAGNOSIS — G25.81 RESTLESS LEGS SYNDROME: ICD-10-CM

## 2025-01-09 NOTE — TELEPHONE ENCOUNTER
Reason for call:   [x] Refill   [] Prior Auth  [] Other:     Office:   [] PCP/Provider -   [x] Specialty/Provider - Chris Carmona/[facility] [place]     Medication: Gabapentin    Dose/Frequency: 400 mg     Quantity: #90    Pharmacy: [address]    Does the patient have enough for 3 days?   [] Yes   [x] No - Send as HP to POD

## 2025-01-09 NOTE — TELEPHONE ENCOUNTER
Last office visit 9/25/2023  Next office visit 2/26/2025,     Called patient left voicemail offering  appointment for 1/22/2025. Call back number given.     Dr. Carmona,      Please review RX request and sign if agreeable,  Thank you

## 2025-01-10 ENCOUNTER — OFFICE VISIT (OUTPATIENT)
Dept: FAMILY MEDICINE CLINIC | Facility: CLINIC | Age: 80
End: 2025-01-10

## 2025-01-10 VITALS
HEIGHT: 61 IN | DIASTOLIC BLOOD PRESSURE: 88 MMHG | BODY MASS INDEX: 36.51 KG/M2 | HEART RATE: 71 BPM | RESPIRATION RATE: 18 BRPM | OXYGEN SATURATION: 97 % | WEIGHT: 193.4 LBS | SYSTOLIC BLOOD PRESSURE: 138 MMHG | TEMPERATURE: 99.7 F

## 2025-01-10 DIAGNOSIS — R32 URINARY INCONTINENCE, UNSPECIFIED TYPE: ICD-10-CM

## 2025-01-10 DIAGNOSIS — R35.0 URINARY FREQUENCY: Primary | ICD-10-CM

## 2025-01-10 LAB
SL AMB  POCT GLUCOSE, UA: NEGATIVE
SL AMB LEUKOCYTE ESTERASE,UA: NEGATIVE
SL AMB POCT BILIRUBIN,UA: NEGATIVE
SL AMB POCT BLOOD,UA: NEGATIVE
SL AMB POCT CLARITY,UA: CLEAR
SL AMB POCT COLOR,UA: YELLOW
SL AMB POCT KETONES,UA: 5
SL AMB POCT NITRITE,UA: NEGATIVE
SL AMB POCT PH,UA: 6
SL AMB POCT SPECIFIC GRAVITY,UA: 1.02
SL AMB POCT URINE PROTEIN: 2000
SL AMB POCT UROBILINOGEN: 0.2

## 2025-01-10 PROCEDURE — 81002 URINALYSIS NONAUTO W/O SCOPE: CPT | Performed by: FAMILY MEDICINE

## 2025-01-10 PROCEDURE — 99213 OFFICE O/P EST LOW 20 MIN: CPT | Performed by: FAMILY MEDICINE

## 2025-01-10 NOTE — PATIENT INSTRUCTIONS
-please get ultrasound done  -please schedule urogynecology appointment  -please schedule pelvic physical therapy

## 2025-01-10 NOTE — PROGRESS NOTES
"Name: Amanda Naik      : 1945      MRN: [id number]  Encounter Provider: Julia Yañez DO  Encounter Date: 1/10/2025   Encounter department: [facility] [place]  :  Assessment & Plan  Urinary frequency  1 month history of increased urinary frequency along with \"pressure \"sensation on urination.  No dysuria, hematuria, vaginal symptoms, pelvic pain/fullness.  History significant for 2 's and hysterectomy.  Type 2 diabetes last A1c 6.6 well-controlled.    Suspecting a mixed urinary incontinence picture.  Patient has had a stress incontinence history since 2018.  Uses a pantiliner which is sufficient/does not soak through.  Continues to only need a pantiliner.  May also now have a overflow incontinence picture as well.    POCT urine dip negative for UTI.  Recommend pelvic floor physical therapy.  Referral to urogynecology.  Meantime, will also check postvoid residual.    Orders:    POCT urine dip    Ambulatory Referral to Urogynecology; Future    US bladder with post void residual; Future    Ambulatory Referral to Physical Therapy; Future    Urinary incontinence, unspecified type  Suspecting mixed urinary incontinence picture.  She more of a stress like incontinence since 2018.  Last month has been having more urinary frequency, suspecting also component of overflow incontinence.  States specifically that she would urinate and then 10 minutes later she would feel the urge to need to urinate again and would just urinate a little bit more.    POCT urine dip negative for UTI.  Recommend pelvic floor physical therapy.  Referral to urogynecology.  Meantime, will also check postvoid residual.    Orders:    Ambulatory Referral to Urogynecology; Future    US bladder with post void residual; Future    Ambulatory Referral to Physical Therapy; Future           History of Present Illness     78YO F who presents today for concerns of increased urinary frequency. Has had leakage of urine for " "the past few years--per chart review, as far back as 2018.  Patient presents today because for the past 1 month she has been having this unusual sensation, close this description to it is \"pressure\" on urination as well as increased urinary frequency.  Described specifically that she feels as though she does not empty her bladder fully; however, 10 minutes later she would feel the urge to need to pee again.  Any pain full urination/dysuria, hematuria.    Denies any fever/chills.  No new medications outside of the prescription list.  Denies any vaginal symptoms, vaginal bleeding, vaginal discharge, pelvic pain.    History of 2 's.  History of hysterectomy.      Review of Systems   Constitutional:  Negative for chills and fever.   Respiratory:  Negative for cough and shortness of breath.    Cardiovascular:  Negative for chest pain and palpitations.   Gastrointestinal:  Negative for abdominal pain, blood in stool, constipation, diarrhea, nausea and vomiting.   Genitourinary:  Positive for frequency. Negative for decreased urine volume, difficulty urinating, dysuria, flank pain, genital sores, hematuria, pelvic pain, urgency, vaginal bleeding, vaginal discharge and vaginal pain.   Neurological:  Negative for dizziness, light-headedness, numbness and headaches.       Objective   /88 (BP Location: Left arm, Patient Position: Sitting, Cuff Size: Standard)   Pulse 71   Temp 99.7 °F (37.6 °C) (Temporal)   Resp 18   Ht 5' 1\" (1.549 m)   Wt 87.7 kg (193 lb 6.4 oz)   SpO2 97%   BMI 36.54 kg/m²      Physical Exam  Vitals reviewed.   Constitutional:       General: She is not in acute distress.     Appearance: She is not ill-appearing, toxic-appearing or diaphoretic.   HENT:      Head: Normocephalic and atraumatic.      Nose: Nose normal. No congestion or rhinorrhea.      Mouth/Throat:      Mouth: Mucous membranes are moist.      Pharynx: Oropharynx is clear. No oropharyngeal exudate or posterior oropharyngeal " erythema.   Eyes:      General: No scleral icterus.        Right eye: No discharge.         Left eye: No discharge.      Conjunctiva/sclera: Conjunctivae normal.   Cardiovascular:      Rate and Rhythm: Normal rate and regular rhythm.      Pulses: Normal pulses.      Heart sounds: Normal heart sounds. No murmur heard.     No friction rub. No gallop.   Pulmonary:      Effort: Pulmonary effort is normal. No respiratory distress.      Breath sounds: Normal breath sounds. No stridor. No wheezing, rhonchi or rales.   Chest:      Chest wall: No tenderness.   Abdominal:      General: Abdomen is flat. There is no distension.      Palpations: Abdomen is soft. There is no mass.      Tenderness: There is no abdominal tenderness. There is no guarding or rebound.      Hernia: No hernia is present.   Musculoskeletal:      Cervical back: Normal range of motion.      Right lower leg: No edema.      Left lower leg: No edema.   Skin:     General: Skin is warm and dry.      Capillary Refill: Capillary refill takes less than 2 seconds.   Neurological:      Mental Status: She is alert.      Comments: conversant   Psychiatric:         Mood and Affect: Mood normal.         Behavior: Behavior normal.         Julia Yañez, DO  PGY-2  [facility]

## 2025-01-14 RX ORDER — GABAPENTIN 400 MG/1
400 CAPSULE ORAL
Qty: 90 CAPSULE | Refills: 0 | Status: SHIPPED | OUTPATIENT
Start: 2025-01-14 | End: 2025-01-15 | Stop reason: SDUPTHER

## 2025-01-15 ENCOUNTER — OFFICE VISIT (OUTPATIENT)
Dept: SLEEP CENTER | Facility: CLINIC | Age: 80
End: 2025-01-15
Payer: MEDICARE

## 2025-01-15 VITALS
WEIGHT: 188 LBS | BODY MASS INDEX: 35.5 KG/M2 | HEIGHT: 61 IN | DIASTOLIC BLOOD PRESSURE: 90 MMHG | SYSTOLIC BLOOD PRESSURE: 126 MMHG

## 2025-01-15 DIAGNOSIS — K21.9 GASTROESOPHAGEAL REFLUX DISEASE, UNSPECIFIED WHETHER ESOPHAGITIS PRESENT: ICD-10-CM

## 2025-01-15 DIAGNOSIS — I10 BENIGN ESSENTIAL HYPERTENSION: ICD-10-CM

## 2025-01-15 DIAGNOSIS — E66.9 OBESITY (BMI 30-39.9): ICD-10-CM

## 2025-01-15 DIAGNOSIS — R40.0 DAYTIME SLEEPINESS: ICD-10-CM

## 2025-01-15 DIAGNOSIS — G25.81 RESTLESS LEGS SYNDROME: ICD-10-CM

## 2025-01-15 DIAGNOSIS — G47.33 OSA (OBSTRUCTIVE SLEEP APNEA): Primary | ICD-10-CM

## 2025-01-15 DIAGNOSIS — R68.2 DRY MOUTH: ICD-10-CM

## 2025-01-15 PROCEDURE — 99214 OFFICE O/P EST MOD 30 MIN: CPT | Performed by: INTERNAL MEDICINE

## 2025-01-15 RX ORDER — GABAPENTIN 400 MG/1
400 CAPSULE ORAL
Qty: 90 CAPSULE | Refills: 3 | Status: SHIPPED | OUTPATIENT
Start: 2025-01-15

## 2025-01-15 NOTE — PROGRESS NOTES
Name: Amanda Naik      : 1945      MRN: [id number]  Encounter Provider: Chris Carmona MD  Encounter Date: 1/15/2025   Encounter department: [facility] [place]  :  Assessment & Plan  JAYA (obstructive sleep apnea)         Restless legs syndrome    Orders:    gabapentin (NEURONTIN) 400 mg capsule; Take 1 capsule (400 mg total) by mouth daily at bedtime    Daytime sleepiness         Dry mouth         Benign essential hypertension         Obesity (BMI 30-39.9)         Gastroesophageal reflux disease, unspecified whether esophagitis present           PLAN - Problem List Items & Comorbidities Addressed this Visit :  I reviewed results of prior studies and physiologic data with the patient.   I discussed treatment options with risks and benefits.  Treatment with  PAP is medically necessary and Amanda is agreable to continue use.   Care of equipment, methods to improve comfort using PAP and importance of compliance with therapy were discussed.  Pressure setting: continue 11 cmH2O using a nasal pillows.    Rx provided to replace supplies and Care coordinated with DME provider.   Since she is been doing well off gabapentin,  I suggested using as needed.  Discussed strategies for weight reduction.    Follow-up is advised in 1 year or sooner if needed to monitor progress, compliance and to adjust therapy.       History of Present Illness   HPI          Follow-Up Note - Sleep Center   Amanda Naik  79 y.o. female  :1945  MRN:[id number]  DOS:1/15/2025    CC: I saw this patient for follow-up in clinic today for Sleep disordered breathing, Coexisting Sleep and Medical Problems.  She got a replacement ResMed machine a few months ago.. Interval changes: None reported.      Patient had a split sleep study in 2019: The diagnostic portion demonstrated: AHI of 13 per hour,   and had no stage REM.  Intermittent snoring of moderate intensity was noted. Minimum oxygen saturation was 88 %.  During  the therapeutic portion of the study, his sleep disordered breathing was adequately remediated with nasal CPAP at 10 cm H2O.  Mean oxygen saturation was 93% with brief desaturations to a triston of 87%.  There were mild periodic limb movements of sleep.       PFSH, Problem List, Medications & Allergies were reviewed in EMR.   She  has a past medical history of Abnormal mammogram of right breast (06/29/2017), Actinic keratosis, Acute medial meniscal tear, left, initial encounter (06/29/2017), Arthritis, Basal cell carcinoma, Colon polyp, Diabetes mellitus (HCC), Encounter for screening mammogram for malignant neoplasm of breast (03/23/2023), Generalized anxiety disorder (08/31/2017), GERD (gastroesophageal reflux disease), Herpes zoster (09/26/2016), Hyperlipidemia, Hypertension, Hypokalemia, Left medial knee pain, Localized osteoarthritis of left knee (03/15/2016), Post-traumatic headache (09/19/2017), Restless leg syndrome, Sleep apnea, and Squamous cell skin cancer.    She has a current medication list which includes the following prescription(s): amlodipine, amlodipine, aspirin, atorvastatin, betamethasone valerate, dorzolamide-timolol, fluorouracil, gabapentin, losartan, metformin, omeprazole, and triamcinolone.    PHYSIOLOGICAL DATA REVIEW : Using PAP > 4 hours/night 100%. Estimated HERVE 0.4/hour with pressure of 11cm H2O ;.  INTERPRETATION: Compliance is excellent; Pressure setting is:optimal; ;     SUBJECTIVE: With respect to use of PAP, Amanda  is experiencing minimal adverse effects: dry mouth/throat.She derives benefit.. Is satisfied with sleep and daytime function.     Sleep Routine: Amanda reports getting (Patient-Rptd) (P) 7 hrs sleep; recently has not been having difficulty initiating and maintaining sleep . She arises spontaneously and  is not always refreshed.Amanda (Patient-Rptd) (P) Usually]reports daytime sleepiness, dozes off when sedentary at home.  She rated herself at Total score: 10 /24 on the [place]  "Sleepiness Scale.   Other issues: She ran out of gabapentin a few days ago but has not been experiencing any restless leg symptoms off the medication.     Habits: Reports that she has never smoked. She has never used smokeless tobacco.,  Reports current alcohol use.,  Reports no history of drug use., Caffeine use: limited until  ; Exercise routine: none.      ROS: Significant for few pounds intentional weight reduction.  Acid reflux is controlled.  Review of systems was otherwise negative..    EXAM: /90   Ht 5' 1\" (1.549 m)   Wt 85.3 kg (188 lb)   BMI 35.52 kg/m²     Wt Readings from Last 3 Encounters:   01/15/25 85.3 kg (188 lb)   01/10/25 87.7 kg (193 lb 6.4 oz)   12/31/24 88.9 kg (196 lb)      Patient is well groomed; well appearing.   CNS: Alert, orientated, speech clear & coherent  Psych: cooperative and in no distress. Mental state: Appears normal.  H&N: EOMI; NC/AT: No facial pressure marks, no rashes.    Skin/Extrem: col & hydration normal; no edema  Resp: Respiratory effort is normal  Physical findings otherwise essentially unchanged from previous.    Labs as listed were reviewed.  Ferritin level is normal    Thank you for allowing me to participate in the care of this patient.    Sincerely,     Authenticated electronically on 01/15/25   Board Certified Specialist     Portions of the record may have been created with voice recognition software. Occasional wrong word or \"sound a like\" substitutions may have occurred due to the inherent limitations of voice recognition software. There may also be notations and random deletions of words or characters from malfunctioning software. Read the chart carefully and recognize, using context, where substitutions/deletions have occurred.    Sitting and reading: (Patient-Rptd) (P) High chance of dozing  Watching TV: (Patient-Rptd) (P) Slight chance of dozing  Sitting, inactive in a public place (e.g. a theatre or a meeting): (Patient-Rptd) (P) Would never " "doze  As a passenger in a car for an hour without a break: (Patient-Rptd) (P) Moderate chance of dozing  Lying down to rest in the afternoon when circumstances permit: (Patient-Rptd) (P) High chance of dozing  Sitting and talking to someone: (Patient-Rptd) (P) Would never doze  Sitting quietly after a lunch without alcohol: Slight chance of dozing  In a car, while stopped for a few minutes in traffic: (Patient-Rptd) (P) Would never doze  Total score: 10     Review of Systems  Pertinent positives/negatives included in HPI and also as noted:       Objective   /90   Ht 5' 1\" (1.549 m)   Wt 85.3 kg (188 lb)   BMI 35.52 kg/m²        Physical Exam    Data  Lab Results   Component Value Date    HGB 13.1 02/22/2024    HCT 40.7 02/22/2024    MCV 94 02/22/2024      Lab Results   Component Value Date    GLUCOSE 127 09/21/2016    CALCIUM 9.8 02/22/2024     12/16/2015    K 3.9 02/22/2024    CO2 28 02/22/2024     02/22/2024    BUN 20 02/22/2024    CREATININE 0.95 02/22/2024     Lab Results   Component Value Date    FERRITIN 222 08/03/2016     Lab Results   Component Value Date    AST 23 02/22/2024    ALT 22 02/22/2024         "

## 2025-01-15 NOTE — ASSESSMENT & PLAN NOTE
Orders:    gabapentin (NEURONTIN) 400 mg capsule; Take 1 capsule (400 mg total) by mouth daily at bedtime

## 2025-01-15 NOTE — ASSESSMENT & PLAN NOTE
PLAN - Problem List Items & Comorbidities Addressed this Visit :  I reviewed results of prior studies and physiologic data with the patient.   I discussed treatment options with risks and benefits.  Treatment with  PAP is medically necessary and Amanda is agreable to continue use.   Care of equipment, methods to improve comfort using PAP and importance of compliance with therapy were discussed.  Pressure setting: continue 11 cmH2O using a nasal pillows.    Rx provided to replace supplies and Care coordinated with DME provider.   Since she is been doing well off gabapentin,  I suggested using as needed.  Discussed strategies for weight reduction.    Follow-up is advised in 1 year or sooner if needed to monitor progress, compliance and to adjust therapy.

## 2025-01-16 NOTE — TELEPHONE ENCOUNTER
Patient called requesting refill for Gabapentin 400 mg. Patient made aware medication was refilled on 1/15/25 for 90 with 3 refills to [facility] pharmacy. Patient instructed to contact the pharmacy to obtain refills of medication. Patient verbalized understanding.

## 2025-01-17 ENCOUNTER — HOSPITAL ENCOUNTER (OUTPATIENT)
Dept: ULTRASOUND IMAGING | Facility: HOSPITAL | Age: 80
Discharge: HOME | End: 2025-01-17
Payer: MEDICARE

## 2025-01-17 DIAGNOSIS — R32 URINARY INCONTINENCE, UNSPECIFIED TYPE: ICD-10-CM

## 2025-01-17 DIAGNOSIS — R35.0 URINARY FREQUENCY: ICD-10-CM

## 2025-01-17 PROCEDURE — 51798 US URINE CAPACITY MEASURE: CPT

## 2025-01-22 ENCOUNTER — RESULTS FOLLOW-UP (OUTPATIENT)
Dept: FAMILY MEDICINE CLINIC | Facility: CLINIC | Age: 80
End: 2025-01-22

## 2025-01-22 DIAGNOSIS — R32 URINARY INCONTINENCE, UNSPECIFIED TYPE: Primary | ICD-10-CM

## 2025-01-22 NOTE — TELEPHONE ENCOUNTER
Called patient and reviewed her normal bladder US results. Patient has an appt with Dr. Dutton in February for further workup of her bladder issues. All questions addressed with patient.        Nancy Roa MD   PGY-2, Family Medicine  [facility]

## 2025-01-24 ENCOUNTER — APPOINTMENT (OUTPATIENT)
Dept: LAB | Facility: CLINIC | Age: 80
End: 2025-01-24
Payer: MEDICARE

## 2025-01-24 ENCOUNTER — RESULTS FOLLOW-UP (OUTPATIENT)
Dept: FAMILY MEDICINE CLINIC | Facility: CLINIC | Age: 80
End: 2025-01-24

## 2025-01-24 DIAGNOSIS — R39.9 UTI SYMPTOMS: Primary | ICD-10-CM

## 2025-01-24 DIAGNOSIS — E79.0 ELEVATED URIC ACID IN BLOOD: ICD-10-CM

## 2025-01-24 DIAGNOSIS — R32 URINARY INCONTINENCE, UNSPECIFIED TYPE: ICD-10-CM

## 2025-01-24 DIAGNOSIS — I10 BENIGN ESSENTIAL HYPERTENSION: ICD-10-CM

## 2025-01-24 LAB
ALBUMIN SERPL BCG-MCNC: 4.6 G/DL (ref 3.5–5)
ALP SERPL-CCNC: 76 U/L (ref 34–104)
ALT SERPL W P-5'-P-CCNC: 11 U/L (ref 7–52)
ANION GAP SERPL CALCULATED.3IONS-SCNC: 10 MMOL/L (ref 4–13)
AST SERPL W P-5'-P-CCNC: 16 U/L (ref 13–39)
BACTERIA UR QL AUTO: ABNORMAL /HPF
BASOPHILS # BLD AUTO: 0.04 THOUSANDS/ΜL (ref 0–0.1)
BASOPHILS NFR BLD AUTO: 1 % (ref 0–1)
BILIRUB SERPL-MCNC: 0.72 MG/DL (ref 0.2–1)
BILIRUB UR QL STRIP: NEGATIVE
BUN SERPL-MCNC: 17 MG/DL (ref 5–25)
CALCIUM SERPL-MCNC: 9.7 MG/DL (ref 8.4–10.2)
CHLORIDE SERPL-SCNC: 103 MMOL/L (ref 96–108)
CHOLEST SERPL-MCNC: 122 MG/DL (ref ?–200)
CLARITY UR: ABNORMAL
CO2 SERPL-SCNC: 31 MMOL/L (ref 21–32)
COLOR UR: ABNORMAL
CREAT SERPL-MCNC: 1.04 MG/DL (ref 0.6–1.3)
CREAT UR-MCNC: 128.8 MG/DL
EOSINOPHIL # BLD AUTO: 0.13 THOUSAND/ΜL (ref 0–0.61)
EOSINOPHIL NFR BLD AUTO: 2 % (ref 0–6)
ERYTHROCYTE [DISTWIDTH] IN BLOOD BY AUTOMATED COUNT: 13.7 % (ref 11.6–15.1)
EST. AVERAGE GLUCOSE BLD GHB EST-MCNC: 126 MG/DL
GFR SERPL CREATININE-BSD FRML MDRD: 51 ML/MIN/1.73SQ M
GLUCOSE P FAST SERPL-MCNC: 150 MG/DL (ref 65–99)
GLUCOSE UR STRIP-MCNC: NEGATIVE MG/DL
HBA1C MFR BLD: 6 %
HCT VFR BLD AUTO: 38 % (ref 34.8–46.1)
HDLC SERPL-MCNC: 45 MG/DL
HGB BLD-MCNC: 12.9 G/DL (ref 11.5–15.4)
HGB UR QL STRIP.AUTO: NEGATIVE
IMM GRANULOCYTES # BLD AUTO: 0.03 THOUSAND/UL (ref 0–0.2)
IMM GRANULOCYTES NFR BLD AUTO: 1 % (ref 0–2)
KETONES UR STRIP-MCNC: NEGATIVE MG/DL
LDLC SERPL CALC-MCNC: 52 MG/DL (ref 0–100)
LEUKOCYTE ESTERASE UR QL STRIP: ABNORMAL
LYMPHOCYTES # BLD AUTO: 2.3 THOUSANDS/ΜL (ref 0.6–4.47)
LYMPHOCYTES NFR BLD AUTO: 36 % (ref 14–44)
MCH RBC QN AUTO: 31.4 PG (ref 26.8–34.3)
MCHC RBC AUTO-ENTMCNC: 33.9 G/DL (ref 31.4–37.4)
MCV RBC AUTO: 93 FL (ref 82–98)
MICROALBUMIN UR-MCNC: 518.8 MG/L
MICROALBUMIN/CREAT 24H UR: 403 MG/G CREATININE (ref 0–30)
MONOCYTES # BLD AUTO: 0.57 THOUSAND/ΜL (ref 0.17–1.22)
MONOCYTES NFR BLD AUTO: 9 % (ref 4–12)
NEUTROPHILS # BLD AUTO: 3.32 THOUSANDS/ΜL (ref 1.85–7.62)
NEUTS SEG NFR BLD AUTO: 51 % (ref 43–75)
NITRITE UR QL STRIP: POSITIVE
NON-SQ EPI CELLS URNS QL MICRO: ABNORMAL /HPF
NONHDLC SERPL-MCNC: 77 MG/DL
NRBC BLD AUTO-RTO: 0 /100 WBCS
PH UR STRIP.AUTO: 5.5 [PH]
PLATELET # BLD AUTO: 386 THOUSANDS/UL (ref 149–390)
PMV BLD AUTO: 10.3 FL (ref 8.9–12.7)
POTASSIUM SERPL-SCNC: 3.4 MMOL/L (ref 3.5–5.3)
PROT SERPL-MCNC: 7.7 G/DL (ref 6.4–8.4)
PROT UR STRIP-MCNC: ABNORMAL MG/DL
RBC # BLD AUTO: 4.11 MILLION/UL (ref 3.81–5.12)
RBC #/AREA URNS AUTO: ABNORMAL /HPF
SODIUM SERPL-SCNC: 144 MMOL/L (ref 135–147)
SP GR UR STRIP.AUTO: 1.01 (ref 1–1.03)
TRIGL SERPL-MCNC: 125 MG/DL (ref ?–150)
URATE SERPL-MCNC: 6.9 MG/DL (ref 2–7.5)
UROBILINOGEN UR STRIP-ACNC: <2 MG/DL
WBC # BLD AUTO: 6.39 THOUSAND/UL (ref 4.31–10.16)
WBC #/AREA URNS AUTO: ABNORMAL /HPF

## 2025-01-24 PROCEDURE — 87077 CULTURE AEROBIC IDENTIFY: CPT

## 2025-01-24 PROCEDURE — 85025 COMPLETE CBC W/AUTO DIFF WBC: CPT

## 2025-01-24 PROCEDURE — 80053 COMPREHEN METABOLIC PANEL: CPT

## 2025-01-24 PROCEDURE — 87186 SC STD MICRODIL/AGAR DIL: CPT

## 2025-01-24 PROCEDURE — 81001 URINALYSIS AUTO W/SCOPE: CPT

## 2025-01-24 PROCEDURE — 36415 COLL VENOUS BLD VENIPUNCTURE: CPT

## 2025-01-24 PROCEDURE — 82570 ASSAY OF URINE CREATININE: CPT

## 2025-01-24 PROCEDURE — 87086 URINE CULTURE/COLONY COUNT: CPT

## 2025-01-24 PROCEDURE — 83036 HEMOGLOBIN GLYCOSYLATED A1C: CPT

## 2025-01-24 PROCEDURE — 82043 UR ALBUMIN QUANTITATIVE: CPT

## 2025-01-24 PROCEDURE — 84550 ASSAY OF BLOOD/URIC ACID: CPT

## 2025-01-24 PROCEDURE — 80061 LIPID PANEL: CPT

## 2025-01-24 RX ORDER — SULFAMETHOXAZOLE AND TRIMETHOPRIM 800; 160 MG/1; MG/1
1 TABLET ORAL 2 TIMES DAILY
Qty: 10 TABLET | Refills: 0 | Status: SHIPPED | OUTPATIENT
Start: 2025-01-24 | End: 2025-01-29

## 2025-01-26 LAB — BACTERIA UR CULT: ABNORMAL

## 2025-01-27 DIAGNOSIS — R39.9 UTI SYMPTOMS: Primary | ICD-10-CM

## 2025-01-27 RX ORDER — CIPROFLOXACIN 500 MG/1
500 TABLET, FILM COATED ORAL EVERY 12 HOURS SCHEDULED
Qty: 14 TABLET | Refills: 0 | Status: SHIPPED | OUTPATIENT
Start: 2025-01-27 | End: 2025-02-03

## 2025-01-27 RX ORDER — CIPROFLOXACIN 500 MG/1
500 TABLET, FILM COATED ORAL EVERY 12 HOURS SCHEDULED
Qty: 10 TABLET | Refills: 0 | Status: SHIPPED | OUTPATIENT
Start: 2025-01-27 | End: 2025-01-27

## 2025-01-28 DIAGNOSIS — I10 BENIGN ESSENTIAL HYPERTENSION: ICD-10-CM

## 2025-01-28 RX ORDER — AMLODIPINE BESYLATE 2.5 MG/1
2.5 TABLET ORAL DAILY
Qty: 90 TABLET | Refills: 2 | Status: SHIPPED | OUTPATIENT
Start: 2025-01-28

## 2025-02-20 DIAGNOSIS — R30.0 DYSURIA: Primary | ICD-10-CM

## 2025-02-21 ENCOUNTER — RESULTS FOLLOW-UP (OUTPATIENT)
Dept: FAMILY MEDICINE CLINIC | Facility: CLINIC | Age: 80
End: 2025-02-21

## 2025-02-21 ENCOUNTER — APPOINTMENT (OUTPATIENT)
Dept: LAB | Facility: CLINIC | Age: 80
End: 2025-02-21
Payer: MEDICARE

## 2025-02-21 ENCOUNTER — RA CDI HCC (OUTPATIENT)
Dept: OTHER | Facility: HOSPITAL | Age: 80
End: 2025-02-21

## 2025-02-21 DIAGNOSIS — R30.0 DYSURIA: ICD-10-CM

## 2025-02-21 LAB
BACTERIA UR QL AUTO: NORMAL /HPF
BILIRUB UR QL STRIP: NEGATIVE
CLARITY UR: CLEAR
COLOR UR: ABNORMAL
GLUCOSE UR STRIP-MCNC: NEGATIVE MG/DL
HGB UR QL STRIP.AUTO: NEGATIVE
KETONES UR STRIP-MCNC: NEGATIVE MG/DL
LEUKOCYTE ESTERASE UR QL STRIP: NEGATIVE
NITRITE UR QL STRIP: NEGATIVE
NON-SQ EPI CELLS URNS QL MICRO: NORMAL /HPF
PH UR STRIP.AUTO: 6 [PH]
PROT UR STRIP-MCNC: ABNORMAL MG/DL
RBC #/AREA URNS AUTO: NORMAL /HPF
SP GR UR STRIP.AUTO: 1.01 (ref 1–1.03)
UROBILINOGEN UR STRIP-ACNC: <2 MG/DL
WBC #/AREA URNS AUTO: NORMAL /HPF

## 2025-02-21 PROCEDURE — 81001 URINALYSIS AUTO W/SCOPE: CPT

## 2025-02-27 ENCOUNTER — OFFICE VISIT (OUTPATIENT)
Dept: FAMILY MEDICINE CLINIC | Facility: CLINIC | Age: 80
End: 2025-02-27

## 2025-02-27 VITALS
DIASTOLIC BLOOD PRESSURE: 79 MMHG | HEIGHT: 61 IN | TEMPERATURE: 97.8 F | OXYGEN SATURATION: 99 % | WEIGHT: 186.6 LBS | BODY MASS INDEX: 35.23 KG/M2 | HEART RATE: 65 BPM | SYSTOLIC BLOOD PRESSURE: 158 MMHG

## 2025-02-27 DIAGNOSIS — E11.22 TYPE 2 DIABETES MELLITUS WITH STAGE 3A CHRONIC KIDNEY DISEASE, WITH LONG-TERM CURRENT USE OF INSULIN (HCC): ICD-10-CM

## 2025-02-27 DIAGNOSIS — Z00.00 MEDICARE ANNUAL WELLNESS VISIT, SUBSEQUENT: Primary | ICD-10-CM

## 2025-02-27 DIAGNOSIS — I10 BENIGN ESSENTIAL HYPERTENSION: ICD-10-CM

## 2025-02-27 DIAGNOSIS — M10.49 OTHER SECONDARY GOUT, MULTIPLE SITES, UNSPECIFIED CHRONICITY: ICD-10-CM

## 2025-02-27 DIAGNOSIS — G25.81 RESTLESS LEGS SYNDROME: ICD-10-CM

## 2025-02-27 DIAGNOSIS — M25.552 LEFT HIP PAIN: ICD-10-CM

## 2025-02-27 DIAGNOSIS — N32.81 OAB (OVERACTIVE BLADDER): ICD-10-CM

## 2025-02-27 DIAGNOSIS — N18.31 STAGE 3A CHRONIC KIDNEY DISEASE (HCC): ICD-10-CM

## 2025-02-27 DIAGNOSIS — Z79.4 TYPE 2 DIABETES MELLITUS WITH STAGE 3A CHRONIC KIDNEY DISEASE, WITH LONG-TERM CURRENT USE OF INSULIN (HCC): ICD-10-CM

## 2025-02-27 DIAGNOSIS — R00.1 BRADYCARDIA: ICD-10-CM

## 2025-02-27 DIAGNOSIS — N18.31 TYPE 2 DIABETES MELLITUS WITH STAGE 3A CHRONIC KIDNEY DISEASE, WITH LONG-TERM CURRENT USE OF INSULIN (HCC): ICD-10-CM

## 2025-02-27 PROCEDURE — G2211 COMPLEX E/M VISIT ADD ON: HCPCS | Performed by: FAMILY MEDICINE

## 2025-02-27 PROCEDURE — G0439 PPPS, SUBSEQ VISIT: HCPCS | Performed by: FAMILY MEDICINE

## 2025-02-27 PROCEDURE — 99214 OFFICE O/P EST MOD 30 MIN: CPT | Performed by: FAMILY MEDICINE

## 2025-02-27 RX ORDER — GABAPENTIN 400 MG/1
400 CAPSULE ORAL AS NEEDED
Status: SHIPPED
Start: 2025-02-27

## 2025-02-27 RX ORDER — METOPROLOL SUCCINATE 25 MG/1
25 TABLET, EXTENDED RELEASE ORAL DAILY
Qty: 30 TABLET | Refills: 1 | Status: SHIPPED | OUTPATIENT
Start: 2025-02-27 | End: 2025-02-27 | Stop reason: ALTCHOICE

## 2025-02-27 RX ORDER — AMLODIPINE BESYLATE 2.5 MG/1
2.5 TABLET ORAL DAILY
Start: 2025-02-27

## 2025-02-27 NOTE — ASSESSMENT & PLAN NOTE
Lab Results   Component Value Date    EGFR 51 01/24/2025    EGFR 57 02/22/2024    EGFR 53 09/19/2023    CREATININE 1.04 01/24/2025    CREATININE 0.95 02/22/2024    CREATININE 1.02 09/19/2023

## 2025-02-27 NOTE — ASSESSMENT & PLAN NOTE
Uric acid now at 6.9.  Continue with low purine diet.  We will continue to monitor off medications.  Repeat uric acid in 3 months  Orders:    Uric acid; Future    Basic metabolic panel; Future

## 2025-02-27 NOTE — PROGRESS NOTES
Name: Amanda Naik      : 1945      MRN: [id number]  Encounter Provider: Lexis Dutton MD  Encounter Date: 2025   Encounter department: [facility] Assessment & Plan Medicare annual wellness visit, subsequent    Orders:    Mammo screening bilateral w cad; Future    Benign essential hypertension  Blood pressure 158/79, goal blood pressure less than 150/90.  Patient is currently on amlodipine 7.5 mg at night and losartan 100 mg daily.  Reviewed blood pressure log, blood pressure ranges between 1 20-1 30 over 70s.  Blood pressure slightly elevated today.  Would continue current regimen at this time and have patient follow-up in 2 months.  Hydrochlorothiazide was discontinued in the past because of elevated uric acid levels.  Patient's heart rate is 52 in office today, would avoid beta-blockers.  If blood pressure continues to be uncontrolled, will consider hydralazine.  Orders:    amLODIPine (NORVASC) 2.5 mg tablet; Take 1 tablet (2.5 mg total) by mouth daily    Type 2 diabetes mellitus with stage 3a chronic kidney disease, with long-term current use of insulin (HCC)    Lab Results   Component Value Date    HGBA1C 6.0 (H) 2025   Within goal recent hemoglobin A1c at 6.  Continue metformin at 500 mg daily.         Stage 3a chronic kidney disease (HCC)  Lab Results   Component Value Date    EGFR 51 2025    EGFR 57 2024    EGFR 53 2023    CREATININE 1.04 2025    CREATININE 0.95 2024    CREATININE 1.02 2023     Advised to avoid nephrotoxic medications like ibuprofen, naproxen       Other secondary gout, multiple sites, unspecified chronicity  Uric acid now at 6.9.  Continue with low purine diet.  We will continue to monitor off medications.  Repeat uric acid in 3 months  Orders:    Uric acid; Future    Basic metabolic panel; Future    Left hip pain  Patient refuses physical therapy.  Patient may take Tylenol arthritis strength  1000 mg in the morning and 1000 at night to help with pain.  Follow-up as needed.       OAB (overactive bladder)  Advised to decrease caffeine intake.  Referral placed to urogyn.  Orders:    Ambulatory Referral to Urogynecology; Future    Restless legs syndrome  Patient takes gabapentin only as needed.  Continue  Orders:    gabapentin (NEURONTIN) 400 mg capsule; Take 1 capsule (400 mg total) by mouth if needed (restless legs)    Bradycardia  Heart rate on arrival 62, on recheck 52.  Patient is asymptomatic, last EKG in 2018, will recheck EKG to get baseline.  Orders:    ECG 12 lead; Future    BMI Counseling: Body mass index is 35.26 kg/m². The BMI is above normal. Nutrition recommendations include decreasing portion sizes, encouraging healthy choices of fruits and vegetables, decreasing fast food intake, consuming healthier snacks and limiting drinks that contain sugar. Exercise recommendations include exercising 3-5 times per week. Rationale for BMI follow-up plan is due to patient being overweight or obese.     Depression Screening and Follow-up Plan: Patient was screened for depression during today's encounter. They screened negative with a PHQ-2 score of 0.        Preventive health issues were discussed with patient, and age appropriate screening tests were ordered as noted in patient's After Visit Summary. Personalized health advice and appropriate referrals for health education or preventive services given if needed, as noted in patient's After Visit Summary.    History of Present Illness     Amanda resented office for annual Medicare wellness visit and to follow-up on urinary incontinence, hypertension.  Amanda was diagnosed with urinary tract infection recently and treated with antibiotics.  She reports she continues to have leakage of urine throughout the day and needs to wear a liner.  The leakage is not just associated with laughing and coughing, and can happen anytime of the day.  When she wakes up in the  morning, she is not able to make it to the bathroom on time either.  She is very worried about urinary incontinence.  She has been checking her blood pressures at home, and has the blood pressure log with her.  She has noticed slight swelling around her ankles and later half of the day.  She is also complaining of hip pain that has been ongoing for few weeks now.  She takes Motrin which helps with hip pain.  She is not interested in physical therapy, and would rather exercise at home.       Patient Care Team:  Lexis Dutton MD as PCP - General (Family Medicine)  DO Praveen Barillas MD Patrick Joseph Brogle, MD    Review of Systems   Constitutional:  Negative for chills and fever.   HENT:  Negative for congestion.    Respiratory:  Negative for shortness of breath.    Cardiovascular:  Positive for leg swelling. Negative for chest pain.   Gastrointestinal:  Negative for diarrhea, nausea and vomiting.   Genitourinary:  Positive for urgency. Negative for difficulty urinating.   Musculoskeletal:  Positive for arthralgias.   Neurological:  Negative for headaches.     Medical History Reviewed by provider this encounter:       Annual Wellness Visit Questionnaire   Amanda is here for her Subsequent Wellness visit. Last Medicare Wellness visit information reviewed, patient interviewed, no change since last AWV.     Health Risk Assessment:   Patient rates overall health as fair. Patient feels that their physical health rating is same. Patient is very satisfied with their life. Eyesight was rated as same. Hearing was rated as same. Patient feels that their emotional and mental health rating is same. Patients states they are never, rarely angry. Patient states they are never, rarely unusually tired/fatigued. Pain experienced in the last 7 days has been none. Patient states that she has experienced no weight loss or gain in last 6 months.     Depression Screening:   PHQ-2 Score: 0      Fall Risk Screening:   In the  past year, patient has experienced: history of falling in past year    Number of falls: 1  Injured during fall?: Yes    Feels unsteady when standing or walking?: No    Worried about falling?: No      Urinary Incontinence Screening:   Patient has not leaked urine accidently in the last six months.     Home Safety:  Patient does not have trouble with stairs inside or outside of their home. Patient has no working smoke alarms and has working carbon monoxide detector. Home safety hazards include: none.     Nutrition:   Current diet is Regular. Patient state she has been eating bananas    Medications:   Patient is not currently taking any over-the-counter supplements. Patient is able to manage medications.     Activities of Daily Living (ADLs)/Instrumental Activities of Daily Living (IADLs):   Walk and transfer into and out of bed and chair?: Yes  Dress and groom yourself?: Yes    Bathe or shower yourself?: Yes    Feed yourself? Yes  Do your laundry/housekeeping?: Yes  Manage your money, pay your bills and track your expenses?: Yes  Make your own meals?: Yes    Do your own shopping?: Yes    Previous Hospitalizations:   Any hospitalizations or ED visits within the last 12 months?: No      Advance Care Planning:   Living will: Yes    Advanced directive: Yes      Cognitive Screening:   Provider or family/friend/caregiver concerned regarding cognition?: No    PREVENTIVE SCREENINGS      Cardiovascular Screening:    General: Screening Not Indicated and History Lipid Disorder      Diabetes Screening:     General: Screening Not Indicated and History Diabetes      Colorectal Cancer Screening:     General: Screening Current      Breast Cancer Screening:     General: Screening Current      Cervical Cancer Screening:    General: Screening Not Indicated      Osteoporosis Screening:    General: Screening Current      Abdominal Aortic Aneurysm (AAA) Screening:        General: Screening Not Indicated      Lung Cancer Screening:      "General: Screening Not Indicated      Hepatitis C Screening:    General: Screening Current    Screening, Brief Intervention, and Referral to Treatment (SBIRT)     Screening  Typical number of drinks in a day: 0  Typical number of drinks in a week: 0  Interpretation: Low risk drinking behavior.    Single Item Drug Screening:  How often have you used an illegal drug (including marijuana) or a prescription medication for non-medical reasons in the past year? never    Single Item Drug Screen Score: 0  Interpretation: Negative screen for possible drug use disorder    Other Counseling Topics:   Car/seat belt/driving safety, skin self-exam, sunscreen and calcium and vitamin D intake and regular weightbearing exercise.     Social Drivers of Health     Financial Resource Strain: Low Risk  (2/22/2025)    Overall Financial Resource Strain (CARDIA)     Difficulty of Paying Living Expenses: Not very hard   Food Insecurity: No Food Insecurity (2/22/2025)    Hunger Vital Sign     Worried About Running Out of Food in the Last Year: Never true     Ran Out of Food in the Last Year: Never true   Transportation Needs: No Transportation Needs (2/22/2025)    PRAPARE - Transportation     Lack of Transportation (Medical): No     Lack of Transportation (Non-Medical): No   Housing Stability: Low Risk  (2/22/2025)    Housing Stability Vital Sign     Unable to Pay for Housing in the Last Year: No     Number of Times Moved in the Last Year: 0     Homeless in the Last Year: No   Utilities: Not At Risk (2/22/2025)    [facility] Utilities     Threatened with loss of utilities: No     No results found.    Objective   /79 (BP Location: Left arm, Patient Position: Sitting, Cuff Size: Large)   Pulse 65   Temp 97.8 °F (36.6 °C) (Temporal)   Ht 5' 1\" (1.549 m)   Wt 84.6 kg (186 lb 9.6 oz)   SpO2 99%   BMI 35.26 kg/m²     Physical Exam  Constitutional:       Appearance: She is well-developed.   HENT:      Head: Normocephalic and atraumatic.      " Right Ear: External ear normal.      Left Ear: External ear normal.   Eyes:      Conjunctiva/sclera: Conjunctivae normal.      Pupils: Pupils are equal, round, and reactive to light.   Cardiovascular:      Rate and Rhythm: Regular rhythm. Bradycardia present.      Heart sounds: Normal heart sounds. No murmur heard.     No friction rub.   Pulmonary:      Effort: Pulmonary effort is normal. No respiratory distress.      Breath sounds: Normal breath sounds. No wheezing or rales.   Abdominal:      General: Bowel sounds are normal. There is no distension.      Palpations: Abdomen is soft.      Tenderness: There is no abdominal tenderness.   Musculoskeletal:         General: Swelling (No pitting edema, but patient does have impression of her shoes on her feet.) present. Normal range of motion.      Cervical back: Normal range of motion and neck supple.   Skin:     General: Skin is warm and dry.   Neurological:      Mental Status: She is alert and oriented to person, place, and time.

## 2025-02-27 NOTE — ASSESSMENT & PLAN NOTE
Advised to decrease caffeine intake.  Referral placed to urogyn.  Orders:    Ambulatory Referral to Urogynecology; Future

## 2025-02-27 NOTE — ASSESSMENT & PLAN NOTE
Blood pressure 158/79, goal blood pressure less than 150/90.  Patient is currently on amlodipine 7.5 mg at night and losartan 100 mg daily.  Reviewed blood pressure log, blood pressure ranges between 1 20-1 30 over 70s.  Blood pressure slightly elevated today.  Would continue current regimen at this time and have patient follow-up in 2 months.  Hydrochlorothiazide was discontinued in the past because of elevated uric acid levels.  Patient's heart rate is 52 in office today, would avoid beta-blockers.  If blood pressure continues to be uncontrolled, will consider hydralazine.  Orders:    amLODIPine (NORVASC) 2.5 mg tablet; Take 1 tablet (2.5 mg total) by mouth daily

## 2025-02-27 NOTE — ASSESSMENT & PLAN NOTE
Lab Results   Component Value Date    HGBA1C 6.0 (H) 01/24/2025   Within goal recent hemoglobin A1c at 6.  Continue metformin at 500 mg daily.

## 2025-02-27 NOTE — PATIENT INSTRUCTIONS
Medicare Preventive Visit Patient Instructions  Thank you for completing your Welcome to Medicare Visit or Medicare Annual Wellness Visit today. Your next wellness visit will be due in one year (2/28/2026).  The screening/preventive services that you may require over the next 5-10 years are detailed below. Some tests may not apply to you based off risk factors and/or age. Screening tests ordered at today's visit but not completed yet may show as past due. Also, please note that scanned in results may not display below.  Preventive Screenings:  Service Recommendations Previous Testing/Comments   Colorectal Cancer Screening  * Colonoscopy    * Fecal Occult Blood Test (FOBT)/Fecal Immunochemical Test (FIT)  * Fecal DNA/Cologuard Test  * Flexible Sigmoidoscopy Age: 45-75 years old   Colonoscopy: every 10 years (may be performed more frequently if at higher risk)  OR  FOBT/FIT: every 1 year  OR  Cologuard: every 3 years  OR  Sigmoidoscopy: every 5 years  Screening may be recommended earlier than age 45 if at higher risk for colorectal cancer. Also, an individualized decision between you and your healthcare provider will decide whether screening between the ages of 76-85 would be appropriate. Colonoscopy: 02/16/2023  FOBT/FIT: Not on file  Cologuard: Not on file  Sigmoidoscopy: Not on file    Screening Current     Breast Cancer Screening Age: 40+ years old  Frequency: every 1-2 years  Not required if history of left and right mastectomy Mammogram: 08/19/2024    Screening Current   Cervical Cancer Screening Between the ages of 21-29, pap smear recommended once every 3 years.   Between the ages of 30-65, can perform pap smear with HPV co-testing every 5 years.   Recommendations may differ for women with a history of total hysterectomy, cervical cancer, or abnormal pap smears in past. Pap Smear: 02/19/2020    Screening Not Indicated   Hepatitis C Screening Once for adults born between 1945 and 1965  More frequently in  patients at high risk for Hepatitis C Hep C Antibody: 08/10/2018    Screening Current   Diabetes Screening 1-2 times per year if you're at risk for diabetes or have pre-diabetes Fasting glucose: 150 mg/dL (1/24/2025)  A1C: 6.0 % (1/24/2025)  Screening Not Indicated  History Diabetes   Cholesterol Screening Once every 5 years if you don't have a lipid disorder. May order more often based on risk factors. Lipid panel: 01/24/2025    Screening Not Indicated  History Lipid Disorder     Other Preventive Screenings Covered by Medicare:  Abdominal Aortic Aneurysm (AAA) Screening: covered once if your at risk. You're considered to be at risk if you have a family history of AAA.  Lung Cancer Screening: covers low dose CT scan once per year if you meet all of the following conditions: (1) Age 55-77; (2) No signs or symptoms of lung cancer; (3) Current smoker or have quit smoking within the last 15 years; (4) You have a tobacco smoking history of at least 20 pack years (packs per day multiplied by number of years you smoked); (5) You get a written order from a healthcare provider.  Glaucoma Screening: covered annually if you're considered high risk: (1) You have diabetes OR (2) Family history of glaucoma OR (3)  aged 50 and older OR (4)  American aged 65 and older  Osteoporosis Screening: covered every 2 years if you meet one of the following conditions: (1) You're estrogen deficient and at risk for osteoporosis based off medical history and other findings; (2) Have a vertebral abnormality; (3) On glucocorticoid therapy for more than 3 months; (4) Have primary hyperparathyroidism; (5) On osteoporosis medications and need to assess response to drug therapy.   Last bone density test (DXA Scan): 12/04/2023.  HIV Screening: covered annually if you're between the age of 15-65. Also covered annually if you are younger than 15 and older than 65 with risk factors for HIV infection. For pregnant patients, it is  covered up to 3 times per pregnancy.    Immunizations:  Immunization Recommendations   Influenza Vaccine Annual influenza vaccination during flu season is recommended for all persons aged >= 6 months who do not have contraindications   Pneumococcal Vaccine   * Pneumococcal conjugate vaccine = PCV13 (Prevnar 13), PCV15 (Vaxneuvance), PCV20 (Prevnar 20)  * Pneumococcal polysaccharide vaccine = PPSV23 (Pneumovax) Adults 19-63 yo with certain risk factors or if 65+ yo  If never received any pneumonia vaccine: recommend Prevnar 20 (PCV20)  Give PCV20 if previously received 1 dose of PCV13 or PPSV23   Hepatitis B Vaccine 3 dose series if at intermediate or high risk (ex: diabetes, end stage renal disease, liver disease)   Respiratory syncytial virus (RSV) Vaccine - COVERED BY MEDICARE PART D  * RSVPreF3 (Arexvy) CDC recommends that adults 60 years of age and older may receive a single dose of RSV vaccine using shared clinical decision-making (SCDM)   Tetanus (Td) Vaccine - COST NOT COVERED BY MEDICARE PART B Following completion of primary series, a booster dose should be given every 10 years to maintain immunity against tetanus. Td may also be given as tetanus wound prophylaxis.   Tdap Vaccine - COST NOT COVERED BY MEDICARE PART B Recommended at least once for all adults. For pregnant patients, recommended with each pregnancy.   Shingles Vaccine (Shingrix) - COST NOT COVERED BY MEDICARE PART B  2 shot series recommended in those 19 years and older who have or will have weakened immune systems or those 50 years and older     Health Maintenance Due:      Topic Date Due   • Breast Cancer Screening: Mammogram  08/19/2025   • DXA SCAN  12/04/2025   • Colorectal Cancer Screening  02/16/2028   • Hepatitis C Screening  Completed     Immunizations Due:  There are no preventive care reminders to display for this patient.  Advance Directives   What are advance directives?  Advance directives are legal documents that state your  wishes and plans for medical care. These plans are made ahead of time in case you lose your ability to make decisions for yourself. Advance directives can apply to any medical decision, such as the treatments you want, and if you want to donate organs.   What are the types of advance directives?  There are many types of advance directives, and each state has rules about how to use them. You may choose a combination of any of the following:  Living will:  This is a written record of the treatment you want. You can also choose which treatments you do not want, which to limit, and which to stop at a certain time. This includes surgery, medicine, IV fluid, and tube feedings.   Durable power of  for healthcare (DPAHC):  This is a written record that states who you want to make healthcare choices for you when you are unable to make them for yourself. This person, called a proxy, is usually a family member or a friend. You may choose more than 1 proxy.  Do not resuscitate (DNR) order:  A DNR order is used in case your heart stops beating or you stop breathing. It is a request not to have certain forms of treatment, such as CPR. A DNR order may be included in other types of advance directives.  Medical directive:  This covers the care that you want if you are in a coma, near death, or unable to make decisions for yourself. You can list the treatments you want for each condition. Treatment may include pain medicine, surgery, blood transfusions, dialysis, IV or tube feedings, and a ventilator (breathing machine).  Values history:  This document has questions about your views, beliefs, and how you feel and think about life. This information can help others choose the care that you would choose.  Why are advance directives important?  An advance directive helps you control your care. Although spoken wishes may be used, it is better to have your wishes written down. Spoken wishes can be misunderstood, or not followed.  Treatments may be given even if you do not want them. An advance directive may make it easier for your family to make difficult choices about your care.   Fall Prevention    Fall prevention  includes ways to make your home and other areas safer. It also includes ways you can move more carefully to prevent a fall. Health conditions that cause changes in your blood pressure, vision, or muscle strength and coordination may increase your risk for falls. Medicines may also increase your risk for falls if they make you dizzy, weak, or sleepy.   Fall prevention tips:   Stand or sit up slowly.    Use assistive devices as directed.    Wear shoes that fit well and have soles that .    Wear a personal alarm.    Stay active.    Manage your medical conditions.    Home Safety Tips:  Add items to prevent falls in the bathroom.    Keep paths clear.    Install bright lights in your home.    Keep items you use often on shelves within reach.    Paint or place reflective tape on the edges of your stairs.    Weight Management   Why it is important to manage your weight:  Being overweight increases your risk of health conditions such as heart disease, high blood pressure, type 2 diabetes, and certain types of cancer. It can also increase your risk for osteoarthritis, sleep apnea, and other respiratory problems. Aim for a slow, steady weight loss. Even a small amount of weight loss can lower your risk of health problems.  How to lose weight safely:  A safe and healthy way to lose weight is to eat fewer calories and get regular exercise. You can lose up about 1 pound a week by decreasing the number of calories you eat by 500 calories each day.   Healthy meal plan for weight management:  A healthy meal plan includes a variety of foods, contains fewer calories, and helps you stay healthy. A healthy meal plan includes the following:  Eat whole-grain foods more often.  A healthy meal plan should contain fiber. Fiber is the part of grains,  fruits, and vegetables that is not broken down by your body. Whole-grain foods are healthy and provide extra fiber in your diet. Some examples of whole-grain foods are whole-wheat breads and pastas, oatmeal, brown rice, and bulgur.  Eat a variety of vegetables every day.  Include dark, leafy greens such as spinach, kale, nancy greens, and mustard greens. Eat yellow and orange vegetables such as carrots, sweet potatoes, and winter squash.   Eat a variety of fruits every day.  Choose fresh or canned fruit (canned in its own juice or light syrup) instead of juice. Fruit juice has very little or no fiber.  Eat low-fat dairy foods.  Drink fat-free (skim) milk or 1% milk. Eat fat-free yogurt and low-fat cottage cheese. Try low-fat cheeses such as mozzarella and other reduced-fat cheeses.  Choose meat and other protein foods that are low in fat.  Choose beans or other legumes such as split peas or lentils. Choose fish, skinless poultry (chicken or turkey), or lean cuts of red meat (beef or pork). Before you cook meat or poultry, cut off any visible fat.   Use less fat and oil.  Try baking foods instead of frying them. Add less fat, such as margarine, sour cream, regular salad dressing and mayonnaise to foods. Eat fewer high-fat foods. Some examples of high-fat foods include french fries, doughnuts, ice cream, and cakes.  Eat fewer sweets.  Limit foods and drinks that are high in sugar. This includes candy, cookies, regular soda, and sweetened drinks.  Exercise:  Exercise at least 30 minutes per day on most days of the week. Some examples of exercise include walking, biking, dancing, and swimming. You can also fit in more physical activity by taking the stairs instead of the elevator or parking farther away from stores. Ask your healthcare provider about the best exercise plan for you.      © Copyright [employer] 2018 Information is for End User's use only and may not be sold, redistributed or otherwise used for  commercial purposes. All illustrations and images included in CareNotes® are the copyrighted property of [employer] or [employer]      Medicare Preventive Visit Patient Instructions  Thank you for completing your Welcome to Medicare Visit or Medicare Annual Wellness Visit today. Your next wellness visit will be due in one year (2/28/2026).  The screening/preventive services that you may require over the next 5-10 years are detailed below. Some tests may not apply to you based off risk factors and/or age. Screening tests ordered at today's visit but not completed yet may show as past due. Also, please note that scanned in results may not display below.  Preventive Screenings:  Service Recommendations Previous Testing/Comments   Colorectal Cancer Screening  * Colonoscopy    * Fecal Occult Blood Test (FOBT)/Fecal Immunochemical Test (FIT)  * Fecal DNA/Cologuard Test  * Flexible Sigmoidoscopy Age: 45-75 years old   Colonoscopy: every 10 years (may be performed more frequently if at higher risk)  OR  FOBT/FIT: every 1 year  OR  Cologuard: every 3 years  OR  Sigmoidoscopy: every 5 years  Screening may be recommended earlier than age 45 if at higher risk for colorectal cancer. Also, an individualized decision between you and your healthcare provider will decide whether screening between the ages of 76-85 would be appropriate. Colonoscopy: 02/16/2023  FOBT/FIT: Not on file  Cologuard: Not on file  Sigmoidoscopy: Not on file    Screening Current     Breast Cancer Screening Age: 40+ years old  Frequency: every 1-2 years  Not required if history of left and right mastectomy Mammogram: 08/19/2024    Screening Current   Cervical Cancer Screening Between the ages of 21-29, pap smear recommended once every 3 years.   Between the ages of 30-65, can perform pap smear with HPV co-testing every 5 years.   Recommendations may differ for women with a history of total hysterectomy, cervical cancer, or abnormal pap smears in  past. Pap Smear: 02/19/2020    Screening Not Indicated   Hepatitis C Screening Once for adults born between 1945 and 1965  More frequently in patients at high risk for Hepatitis C Hep C Antibody: 08/10/2018    Screening Current   Diabetes Screening 1-2 times per year if you're at risk for diabetes or have pre-diabetes Fasting glucose: 150 mg/dL (1/24/2025)  A1C: 6.0 % (1/24/2025)  Screening Not Indicated  History Diabetes   Cholesterol Screening Once every 5 years if you don't have a lipid disorder. May order more often based on risk factors. Lipid panel: 01/24/2025    Screening Not Indicated  History Lipid Disorder     Other Preventive Screenings Covered by Medicare:  Abdominal Aortic Aneurysm (AAA) Screening: covered once if your at risk. You're considered to be at risk if you have a family history of AAA.  Lung Cancer Screening: covers low dose CT scan once per year if you meet all of the following conditions: (1) Age 55-77; (2) No signs or symptoms of lung cancer; (3) Current smoker or have quit smoking within the last 15 years; (4) You have a tobacco smoking history of at least 20 pack years (packs per day multiplied by number of years you smoked); (5) You get a written order from a healthcare provider.  Glaucoma Screening: covered annually if you're considered high risk: (1) You have diabetes OR (2) Family history of glaucoma OR (3)  aged 50 and older OR (4)  American aged 65 and older  Osteoporosis Screening: covered every 2 years if you meet one of the following conditions: (1) You're estrogen deficient and at risk for osteoporosis based off medical history and other findings; (2) Have a vertebral abnormality; (3) On glucocorticoid therapy for more than 3 months; (4) Have primary hyperparathyroidism; (5) On osteoporosis medications and need to assess response to drug therapy.   Last bone density test (DXA Scan): 12/04/2023.  HIV Screening: covered annually if you're between the age of  15-65. Also covered annually if you are younger than 15 and older than 65 with risk factors for HIV infection. For pregnant patients, it is covered up to 3 times per pregnancy.    Immunizations:  Immunization Recommendations   Influenza Vaccine Annual influenza vaccination during flu season is recommended for all persons aged >= 6 months who do not have contraindications   Pneumococcal Vaccine   * Pneumococcal conjugate vaccine = PCV13 (Prevnar 13), PCV15 (Vaxneuvance), PCV20 (Prevnar 20)  * Pneumococcal polysaccharide vaccine = PPSV23 (Pneumovax) Adults 19-65 yo with certain risk factors or if 65+ yo  If never received any pneumonia vaccine: recommend Prevnar 20 (PCV20)  Give PCV20 if previously received 1 dose of PCV13 or PPSV23   Hepatitis B Vaccine 3 dose series if at intermediate or high risk (ex: diabetes, end stage renal disease, liver disease)   Respiratory syncytial virus (RSV) Vaccine - COVERED BY MEDICARE PART D  * RSVPreF3 (Arexvy) CDC recommends that adults 60 years of age and older may receive a single dose of RSV vaccine using shared clinical decision-making (SCDM)   Tetanus (Td) Vaccine - COST NOT COVERED BY MEDICARE PART B Following completion of primary series, a booster dose should be given every 10 years to maintain immunity against tetanus. Td may also be given as tetanus wound prophylaxis.   Tdap Vaccine - COST NOT COVERED BY MEDICARE PART B Recommended at least once for all adults. For pregnant patients, recommended with each pregnancy.   Shingles Vaccine (Shingrix) - COST NOT COVERED BY MEDICARE PART B  2 shot series recommended in those 19 years and older who have or will have weakened immune systems or those 50 years and older     Health Maintenance Due:      Topic Date Due   • Breast Cancer Screening: Mammogram  08/19/2025   • DXA SCAN  12/04/2025   • Colorectal Cancer Screening  02/16/2028   • Hepatitis C Screening  Completed     Immunizations Due:  There are no preventive care reminders  to display for this patient.  Advance Directives   What are advance directives?  Advance directives are legal documents that state your wishes and plans for medical care. These plans are made ahead of time in case you lose your ability to make decisions for yourself. Advance directives can apply to any medical decision, such as the treatments you want, and if you want to donate organs.   What are the types of advance directives?  There are many types of advance directives, and each state has rules about how to use them. You may choose a combination of any of the following:  Living will:  This is a written record of the treatment you want. You can also choose which treatments you do not want, which to limit, and which to stop at a certain time. This includes surgery, medicine, IV fluid, and tube feedings.   Durable power of  for healthcare (DPAHC):  This is a written record that states who you want to make healthcare choices for you when you are unable to make them for yourself. This person, called a proxy, is usually a family member or a friend. You may choose more than 1 proxy.  Do not resuscitate (DNR) order:  A DNR order is used in case your heart stops beating or you stop breathing. It is a request not to have certain forms of treatment, such as CPR. A DNR order may be included in other types of advance directives.  Medical directive:  This covers the care that you want if you are in a coma, near death, or unable to make decisions for yourself. You can list the treatments you want for each condition. Treatment may include pain medicine, surgery, blood transfusions, dialysis, IV or tube feedings, and a ventilator (breathing machine).  Values history:  This document has questions about your views, beliefs, and how you feel and think about life. This information can help others choose the care that you would choose.  Why are advance directives important?  An advance directive helps you control your care.  Although spoken wishes may be used, it is better to have your wishes written down. Spoken wishes can be misunderstood, or not followed. Treatments may be given even if you do not want them. An advance directive may make it easier for your family to make difficult choices about your care.   Fall Prevention    Fall prevention  includes ways to make your home and other areas safer. It also includes ways you can move more carefully to prevent a fall. Health conditions that cause changes in your blood pressure, vision, or muscle strength and coordination may increase your risk for falls. Medicines may also increase your risk for falls if they make you dizzy, weak, or sleepy.   Fall prevention tips:   Stand or sit up slowly.    Use assistive devices as directed.    Wear shoes that fit well and have soles that .    Wear a personal alarm.    Stay active.    Manage your medical conditions.    Home Safety Tips:  Add items to prevent falls in the bathroom.    Keep paths clear.    Install bright lights in your home.    Keep items you use often on shelves within reach.    Paint or place reflective tape on the edges of your stairs.    Weight Management   Why it is important to manage your weight:  Being overweight increases your risk of health conditions such as heart disease, high blood pressure, type 2 diabetes, and certain types of cancer. It can also increase your risk for osteoarthritis, sleep apnea, and other respiratory problems. Aim for a slow, steady weight loss. Even a small amount of weight loss can lower your risk of health problems.  How to lose weight safely:  A safe and healthy way to lose weight is to eat fewer calories and get regular exercise. You can lose up about 1 pound a week by decreasing the number of calories you eat by 500 calories each day.   Healthy meal plan for weight management:  A healthy meal plan includes a variety of foods, contains fewer calories, and helps you stay healthy. A healthy meal  plan includes the following:  Eat whole-grain foods more often.  A healthy meal plan should contain fiber. Fiber is the part of grains, fruits, and vegetables that is not broken down by your body. Whole-grain foods are healthy and provide extra fiber in your diet. Some examples of whole-grain foods are whole-wheat breads and pastas, oatmeal, brown rice, and bulgur.  Eat a variety of vegetables every day.  Include dark, leafy greens such as spinach, kale, nancy greens, and mustard greens. Eat yellow and orange vegetables such as carrots, sweet potatoes, and winter squash.   Eat a variety of fruits every day.  Choose fresh or canned fruit (canned in its own juice or light syrup) instead of juice. Fruit juice has very little or no fiber.  Eat low-fat dairy foods.  Drink fat-free (skim) milk or 1% milk. Eat fat-free yogurt and low-fat cottage cheese. Try low-fat cheeses such as mozzarella and other reduced-fat cheeses.  Choose meat and other protein foods that are low in fat.  Choose beans or other legumes such as split peas or lentils. Choose fish, skinless poultry (chicken or turkey), or lean cuts of red meat (beef or pork). Before you cook meat or poultry, cut off any visible fat.   Use less fat and oil.  Try baking foods instead of frying them. Add less fat, such as margarine, sour cream, regular salad dressing and mayonnaise to foods. Eat fewer high-fat foods. Some examples of high-fat foods include french fries, doughnuts, ice cream, and cakes.  Eat fewer sweets.  Limit foods and drinks that are high in sugar. This includes candy, cookies, regular soda, and sweetened drinks.  Exercise:  Exercise at least 30 minutes per day on most days of the week. Some examples of exercise include walking, biking, dancing, and swimming. You can also fit in more physical activity by taking the stairs instead of the elevator or parking farther away from stores. Ask your healthcare provider about the best exercise plan for you.       © Copyright [employer] 2018 Information is for End User's use only and may not be sold, redistributed or otherwise used for commercial purposes. All illustrations and images included in CareNotes® are the copyrighted property of [employer] or [employer]

## 2025-02-27 NOTE — ASSESSMENT & PLAN NOTE
Patient refuses physical therapy.  Patient may take Tylenol arthritis strength 1000 mg in the morning and 1000 at night to help with pain.  Follow-up as needed.

## 2025-02-27 NOTE — ASSESSMENT & PLAN NOTE
Patient takes gabapentin only as needed.  Continue  Orders:    gabapentin (NEURONTIN) 400 mg capsule; Take 1 capsule (400 mg total) by mouth if needed (restless legs)

## 2025-02-27 NOTE — PROGRESS NOTES
Name: Amanda Naik      : 1945      MRN: [id number]  Encounter Provider: Lexis Dutton MD  Encounter Date: 2025   Encounter department: [facility] [place] Assessment & Plan Medicare annual wellness visit, subsequent         Benign essential hypertension    Orders:  •  metoprolol succinate (TOPROL-XL) 25 mg 24 hr tablet; Take 1 tablet (25 mg total) by mouth daily    Type 2 diabetes mellitus with stage 3a chronic kidney disease, with long-term current use of insulin (HCC)    Lab Results   Component Value Date    HGBA1C 6.0 (H) 2025          Stage 3a chronic kidney disease (HCC)  Lab Results   Component Value Date    EGFR 51 2025    EGFR 57 2024    EGFR 53 2023    CREATININE 1.04 2025    CREATININE 0.95 2024    CREATININE 1.02 2023          Other secondary gout, multiple sites, unspecified chronicity         Left hip pain         OAB (overactive bladder)    Orders:  •  Ambulatory Referral to Urogynecology; Future    Restless legs syndrome    Orders:  •  gabapentin (NEURONTIN) 400 mg capsule; Take 1 capsule (400 mg total) by mouth if needed (restless legs)         Preventive health issues were discussed with patient, and age appropriate screening tests were ordered as noted in patient's After Visit Summary. Personalized health advice and appropriate referrals for health education or preventive services given if needed, as noted in patient's After Visit Summary.    History of Present Illness     HPI   Patient Care Team:  Lexis Dutton MD as PCP - General (Family Medicine)  DO Praveen Barillas MD Patrick Joseph Brogle, MD    Review of Systems  Medical History Reviewed by provider this encounter:       Annual Wellness Visit Questionnaire   Amanda is here for her Subsequent Wellness visit.     Depression Screening:   PHQ-2 Score: 0      Fall Risk Screening:   In the past year, patient has experienced: history of falling  "in past year      Advance Care Planning:   Living will: Yes    Advanced directive: Yes    Advanced directive counseling given: No      Comments: POA Wesley     PREVENTIVE SCREENINGS      Cardiovascular Screening:    General: Screening Not Indicated and History Lipid Disorder      Diabetes Screening:     General: Screening Not Indicated and History Diabetes      Colorectal Cancer Screening:     General: Screening Current      Breast Cancer Screening:     General: Screening Current      Cervical Cancer Screening:    General: Screening Not Indicated      Osteoporosis Screening:    General: Screening Current      Lung Cancer Screening:     General: Screening Not Indicated      Hepatitis C Screening:    General: Screening Current    Social Drivers of Health     Financial Resource Strain: Low Risk  (2/22/2025)    Overall Financial Resource Strain (CARDIA)    • Difficulty of Paying Living Expenses: Not very hard   Food Insecurity: No Food Insecurity (2/22/2025)    Hunger Vital Sign    • Worried About Running Out of Food in the Last Year: Never true    • Ran Out of Food in the Last Year: Never true   Transportation Needs: No Transportation Needs (2/22/2025)    PRAPARE - Transportation    • Lack of Transportation (Medical): No    • Lack of Transportation (Non-Medical): No   Housing Stability: Low Risk  (2/22/2025)    Housing Stability Vital Sign    • Unable to Pay for Housing in the Last Year: No    • Number of Times Moved in the Last Year: 0    • Homeless in the Last Year: No   Utilities: Not At Risk (2/22/2025)    [facility] Utilities    • Threatened with loss of utilities: No     No results found.    Objective   /79 (BP Location: Left arm, Patient Position: Sitting, Cuff Size: Large)   Pulse 65   Temp 97.8 °F (36.6 °C) (Temporal)   Ht 5' 1\" (1.549 m)   Wt 84.6 kg (186 lb 9.6 oz)   SpO2 99%   BMI 35.26 kg/m²     Physical Exam    "

## 2025-02-27 NOTE — ASSESSMENT & PLAN NOTE
Lab Results   Component Value Date    EGFR 51 01/24/2025    EGFR 57 02/22/2024    EGFR 53 09/19/2023    CREATININE 1.04 01/24/2025    CREATININE 0.95 02/22/2024    CREATININE 1.02 09/19/2023     Advised to avoid nephrotoxic medications like ibuprofen, naproxen

## 2025-03-04 ENCOUNTER — OFFICE VISIT (OUTPATIENT)
Dept: LAB | Facility: CLINIC | Age: 80
End: 2025-03-04
Payer: MEDICARE

## 2025-03-04 DIAGNOSIS — R00.1 BRADYCARDIA: ICD-10-CM

## 2025-03-04 LAB
ATRIAL RATE: 81 BPM
P AXIS: 11 DEGREES
PR INTERVAL: 164 MS
QRS AXIS: 0 DEGREES
QRSD INTERVAL: 78 MS
QT INTERVAL: 358 MS
QTC INTERVAL: 416 MS
T WAVE AXIS: 41 DEGREES
VENTRICULAR RATE: 81 BPM

## 2025-03-04 PROCEDURE — 93010 ELECTROCARDIOGRAM REPORT: CPT | Performed by: INTERNAL MEDICINE

## 2025-03-04 PROCEDURE — 93005 ELECTROCARDIOGRAM TRACING: CPT

## 2025-03-07 DIAGNOSIS — R55 SYNCOPE, UNSPECIFIED SYNCOPE TYPE: Primary | ICD-10-CM

## 2025-04-01 ENCOUNTER — HOSPITAL ENCOUNTER (OUTPATIENT)
Dept: NON INVASIVE DIAGNOSTICS | Facility: CLINIC | Age: 80
Discharge: HOME | End: 2025-04-01
Payer: MEDICARE

## 2025-04-01 VITALS
WEIGHT: 186 LBS | HEART RATE: 65 BPM | SYSTOLIC BLOOD PRESSURE: 158 MMHG | HEIGHT: 61 IN | BODY MASS INDEX: 35.12 KG/M2 | DIASTOLIC BLOOD PRESSURE: 79 MMHG

## 2025-04-01 DIAGNOSIS — R55 SYNCOPE, UNSPECIFIED SYNCOPE TYPE: ICD-10-CM

## 2025-04-01 LAB
AORTIC ROOT: 3 CM
AORTIC VALVE MEAN VELOCITY: 13.4 M/S
ASCENDING AORTA: 3.6 CM
AV AREA BY CONTINUOUS VTI: 2.1 CM2
AV AREA PEAK VELOCITY: 2 CM2
AV LVOT MEAN GRADIENT: 5 MMHG
AV LVOT PEAK GRADIENT: 8 MMHG
AV MEAN PRESS GRAD SYS DOP V1V2: 8 MMHG
AV ORIFICE AREA US: 2.05 CM2
AV PEAK GRADIENT: 17 MMHG
AV REGURGITATION PRESSURE HALF TIME: 482 MS
AV VELOCITY RATIO: 0.72
AV VMAX SYS DOP: 2.05 M/S
BSA FOR ECHO PROCEDURE: 1.83 M2
DOP CALC AO VTI: 42.95 CM
DOP CALC LVOT AREA: 2.83 CM2
DOP CALC LVOT CARDIAC INDEX: 4.05 L/MIN/M2
DOP CALC LVOT CARDIAC OUTPUT: 7.41 L/MIN
DOP CALC LVOT DIAMETER: 1.9 CM
DOP CALC LVOT PEAK VEL VTI: 31.11 CM
DOP CALC LVOT PEAK VEL: 1.43 M/S
DOP CALC LVOT STROKE INDEX: 46.4 ML/M2
DOP CALC LVOT STROKE VOLUME: 88.16
E WAVE DECELERATION TIME: 191 MS
E/A RATIO: 0.86
FRACTIONAL SHORTENING: 35 (ref 28–44)
INTERVENTRICULAR SEPTUM IN DIASTOLE (PARASTERNAL SHORT AXIS VIEW): 1.3 CM
INTERVENTRICULAR SEPTUM: 1.3 CM (ref 0.6–1.1)
LAAS-AP2: 21.9 CM2
LAAS-AP4: 17.9 CM2
LEFT ATRIUM SIZE: 2.9 CM
LEFT ATRIUM VOLUME (MOD BIPLANE): 59 ML
LEFT ATRIUM VOLUME INDEX (MOD BIPLANE): 32.2 ML/M2
LEFT INTERNAL DIMENSION IN SYSTOLE: 2.8 CM (ref 2.1–4)
LEFT VENTRICULAR INTERNAL DIMENSION IN DIASTOLE: 4.3 CM (ref 3.5–6)
LEFT VENTRICULAR POSTERIOR WALL IN END DIASTOLE: 0.9 CM
LEFT VENTRICULAR STROKE VOLUME: 55 ML
LV EF US.2D.A4C+ESTIMATED: 60 %
LVSV (TEICH): 55 ML
MV E'TISSUE VEL-SEP: 10 CM/S
MV PEAK A VEL: 1.2 M/S
MV PEAK E VEL: 103 CM/S
MV STENOSIS PRESSURE HALF TIME: 55 MS
MV VALVE AREA P 1/2 METHOD: 4
RIGHT ATRIUM AREA SYSTOLE A4C: 11.9 CM2
RIGHT VENTRICLE ID DIMENSION: 3.5 CM
SL CV AV DECELERATION TIME RETROGRADE: 1662 MS
SL CV AV PEAK GRADIENT RETROGRADE: 77 MMHG
SL CV LEFT ATRIUM LENGTH A2C: 5.7 CM
SL CV LV EF: 60
SL CV PED ECHO LEFT VENTRICLE DIASTOLIC VOLUME (MOD BIPLANE) 2D: 84 ML
SL CV PED ECHO LEFT VENTRICLE SYSTOLIC VOLUME (MOD BIPLANE) 2D: 29 ML
TRICUSPID ANNULAR PLANE SYSTOLIC EXCURSION: 2.8 CM

## 2025-04-01 PROCEDURE — 93306 TTE W/DOPPLER COMPLETE: CPT

## 2025-04-01 PROCEDURE — 93306 TTE W/DOPPLER COMPLETE: CPT | Performed by: INTERNAL MEDICINE

## 2025-04-03 ENCOUNTER — OFFICE VISIT (OUTPATIENT)
Dept: FAMILY MEDICINE CLINIC | Facility: CLINIC | Age: 80
End: 2025-04-03

## 2025-04-03 VITALS
TEMPERATURE: 98.4 F | OXYGEN SATURATION: 99 % | SYSTOLIC BLOOD PRESSURE: 151 MMHG | BODY MASS INDEX: 35.45 KG/M2 | HEIGHT: 61 IN | HEART RATE: 69 BPM | DIASTOLIC BLOOD PRESSURE: 77 MMHG | RESPIRATION RATE: 16 BRPM | WEIGHT: 187.8 LBS

## 2025-04-03 DIAGNOSIS — N32.81 OAB (OVERACTIVE BLADDER): ICD-10-CM

## 2025-04-03 DIAGNOSIS — I10 BENIGN ESSENTIAL HYPERTENSION: Primary | ICD-10-CM

## 2025-04-03 DIAGNOSIS — Z87.898 H/O SYNCOPE: ICD-10-CM

## 2025-04-03 PROCEDURE — 99214 OFFICE O/P EST MOD 30 MIN: CPT | Performed by: FAMILY MEDICINE

## 2025-04-03 PROCEDURE — G2211 COMPLEX E/M VISIT ADD ON: HCPCS | Performed by: FAMILY MEDICINE

## 2025-04-03 RX ORDER — VIBEGRON 75 MG/1
75 TABLET, FILM COATED ORAL DAILY
Start: 2025-04-03

## 2025-04-03 NOTE — ASSESSMENT & PLAN NOTE
Blood pressure 151/77, on recheck still the same.  Reviewed blood pressure log.  Blood pressure usually in the range of 1 20-1 40 over 70s.  She is currently on losartan 100 mg and amlodipine 7.5 mg daily.  Will continue current regimen at this time and have her follow-up.  Continue keeping blood pressure log.  Continue lifestyle modification with diet and exercise

## 2025-04-03 NOTE — ASSESSMENT & PLAN NOTE
Reports improvement with Gemtesa, will provide refill.  Patient is not interested in uroflow studies at this time  Orders:    Vibegron (Gemtesa) 75 MG TABS; Take 75 mg by mouth in the morning

## 2025-04-03 NOTE — ASSESSMENT & PLAN NOTE
History of syncopal episode, reviewed recent EKG.  Also discussed echo which showed EF of 60%, no diastolic dysfunction.  Aortic valve sclerosis.  Advised to get Holter monitoring done and BMP.  Will follow-up at next visit.  Advised to stay hydrated.

## 2025-04-03 NOTE — PROGRESS NOTES
Name: Amanda Naik      : 1945      MRN: [id number]  Encounter Provider: Lexis Dutton MD  Encounter Date: 4/3/2025   Encounter department: [facility] [place]  :  Assessment & Plan  Benign essential hypertension  Blood pressure 151/77, on recheck still the same.  Reviewed blood pressure log.  Blood pressure usually in the range of 1 20-1 40 over 70s.  She is currently on losartan 100 mg and amlodipine 7.5 mg daily.  Will continue current regimen at this time and have her follow-up.  Continue keeping blood pressure log.  Continue lifestyle modification with diet and exercise       OAB (overactive bladder)  Reports improvement with Gemtesa, will provide refill.  Patient is not interested in uroflow studies at this time  Orders:    Vibegron (Gemtesa) 75 MG TABS; Take 75 mg by mouth in the morning    H/O syncope  History of syncopal episode, reviewed recent EKG.  Also discussed echo which showed EF of 60%, no diastolic dysfunction.  Aortic valve sclerosis.  Advised to get Holter monitoring done and BMP.  Will follow-up at next visit.  Advised to stay hydrated.              History of Present Illness   Amanda presented to office for a follow-up visit of hypertension and syncopal event.  She reports she has been taking medications regularly, has no significant side effects.  She has also been keeping a blood pressure log.  She recently had a syncopal event, EMT had to be called.  She was at her son's birthday party, reports she had not been drinking a lot of water, had 2 glasses of wine and she bent over, on her way up, she felt lightheaded and then passed out for couple seconds.  While her family was there, they called EMT.  She regained consciousness immediately.  She was evaluated by EMT and EKG blood pressure and vitals were normal.  She has not had any such events since then.  She does report sometimes feeling palpitations when laying on bed at night.  She denies any other  "symptoms today.  She did go to urogyn and was started on Gemtesa.  She does feel improvement with this medication and is wondering if further refills can be prescribed.      Review of Systems   Constitutional:  Negative for chills and fever.   HENT:  Negative for congestion.    Respiratory:  Negative for shortness of breath.    Cardiovascular:  Negative for chest pain.   Gastrointestinal:  Negative for diarrhea, nausea and vomiting.   Genitourinary:  Negative for difficulty urinating.   Neurological:  Positive for syncope. Negative for headaches.       Objective   /77   Pulse 69   Temp 98.4 °F (36.9 °C)   Resp 16   Ht 5' 1\" (1.549 m)   Wt 85.2 kg (187 lb 12.8 oz)   SpO2 99%   BMI 35.48 kg/m²      Physical Exam  Constitutional:       Appearance: She is well-developed.   HENT:      Head: Normocephalic and atraumatic.      Right Ear: External ear normal.      Left Ear: External ear normal.   Eyes:      Conjunctiva/sclera: Conjunctivae normal.      Pupils: Pupils are equal, round, and reactive to light.   Cardiovascular:      Rate and Rhythm: Normal rate and regular rhythm.      Heart sounds: Normal heart sounds. No murmur heard.     No friction rub.   Pulmonary:      Effort: Pulmonary effort is normal. No respiratory distress.      Breath sounds: Normal breath sounds. No wheezing or rales.   Musculoskeletal:         General: No swelling. Normal range of motion.      Cervical back: Normal range of motion and neck supple.   Skin:     General: Skin is warm and dry.   Neurological:      Mental Status: She is alert and oriented to person, place, and time.         "

## 2025-04-04 ENCOUNTER — APPOINTMENT (OUTPATIENT)
Dept: LAB | Facility: CLINIC | Age: 80
End: 2025-04-04
Payer: MEDICARE

## 2025-04-04 DIAGNOSIS — M10.49 OTHER SECONDARY GOUT, MULTIPLE SITES, UNSPECIFIED CHRONICITY: ICD-10-CM

## 2025-04-04 LAB
ANION GAP SERPL CALCULATED.3IONS-SCNC: 9 MMOL/L (ref 4–13)
BUN SERPL-MCNC: 12 MG/DL (ref 5–25)
CALCIUM SERPL-MCNC: 9.4 MG/DL (ref 8.4–10.2)
CHLORIDE SERPL-SCNC: 103 MMOL/L (ref 96–108)
CO2 SERPL-SCNC: 29 MMOL/L (ref 21–32)
CREAT SERPL-MCNC: 0.68 MG/DL (ref 0.6–1.3)
GFR SERPL CREATININE-BSD FRML MDRD: 83 ML/MIN/1.73SQ M
GLUCOSE P FAST SERPL-MCNC: 126 MG/DL (ref 65–99)
POTASSIUM SERPL-SCNC: 3.4 MMOL/L (ref 3.5–5.3)
SODIUM SERPL-SCNC: 141 MMOL/L (ref 135–147)
URATE SERPL-MCNC: 4.5 MG/DL (ref 2–7.5)

## 2025-04-04 PROCEDURE — 84550 ASSAY OF BLOOD/URIC ACID: CPT

## 2025-04-04 PROCEDURE — 80048 BASIC METABOLIC PNL TOTAL CA: CPT

## 2025-04-04 PROCEDURE — 36415 COLL VENOUS BLD VENIPUNCTURE: CPT

## 2025-04-14 ENCOUNTER — HOSPITAL ENCOUNTER (OUTPATIENT)
Dept: NON INVASIVE DIAGNOSTICS | Facility: CLINIC | Age: 80
Discharge: HOME | End: 2025-04-14
Payer: MEDICARE

## 2025-04-14 DIAGNOSIS — R55 SYNCOPE, UNSPECIFIED SYNCOPE TYPE: ICD-10-CM

## 2025-04-14 PROCEDURE — 93226 XTRNL ECG REC<48 HR SCAN A/R: CPT

## 2025-04-14 PROCEDURE — 93225 XTRNL ECG REC<48 HRS REC: CPT

## 2025-04-15 ENCOUNTER — OFFICE VISIT (OUTPATIENT)
Dept: FAMILY MEDICINE CLINIC | Facility: CLINIC | Age: 80
End: 2025-04-15

## 2025-04-15 VITALS
DIASTOLIC BLOOD PRESSURE: 77 MMHG | TEMPERATURE: 98.7 F | WEIGHT: 185 LBS | OXYGEN SATURATION: 98 % | RESPIRATION RATE: 16 BRPM | HEART RATE: 71 BPM | BODY MASS INDEX: 34.96 KG/M2 | SYSTOLIC BLOOD PRESSURE: 148 MMHG

## 2025-04-15 DIAGNOSIS — I10 BENIGN ESSENTIAL HYPERTENSION: Primary | ICD-10-CM

## 2025-04-15 DIAGNOSIS — Z87.898 H/O SYNCOPE: ICD-10-CM

## 2025-04-15 DIAGNOSIS — N32.81 OAB (OVERACTIVE BLADDER): ICD-10-CM

## 2025-04-15 PROCEDURE — 99214 OFFICE O/P EST MOD 30 MIN: CPT | Performed by: FAMILY MEDICINE

## 2025-04-15 PROCEDURE — G2211 COMPLEX E/M VISIT ADD ON: HCPCS | Performed by: FAMILY MEDICINE

## 2025-04-15 RX ORDER — SOLIFENACIN SUCCINATE 5 MG/1
5 TABLET, FILM COATED ORAL DAILY
Qty: 30 TABLET | Refills: 1 | Status: SHIPPED | OUTPATIENT
Start: 2025-04-15

## 2025-04-15 NOTE — ASSESSMENT & PLAN NOTE
Blood pressure 148/77, reviewed blood pressure log blood pressure usually in 130s over 70s with the current regimen.  Will continue current regimen at this time, if continues to be uncontrolled we will add metoprolol

## 2025-04-15 NOTE — ASSESSMENT & PLAN NOTE
BMP unremarkable except for slightly low potassium levels.  Advised potassium rich diet.  Will await results of Holter monitoring.

## 2025-04-15 NOTE — PROGRESS NOTES
Name: Amanda Naik      : 1945      MRN: [id number]  Encounter Provider: Lexis Dutton MD  Encounter Date: 4/15/2025   Encounter department: [facility] [place]  :  Assessment & Plan  OAB (overactive bladder)  Patient reports much improvement with Gemtesa, but unfortunately insurance does not cover it.  Discussed alternative Myrbetriq, but patient's blood pressure has been running high.  We will try Vesicare 5 mg at this time.  Discussed side effects.  Also discussed lifestyle modification with avoiding caffeine and sugary drinks.  Will follow-up in 3 months.  Orders:    solifenacin (VESICARE) 5 mg tablet; Take 1 tablet (5 mg total) by mouth daily    Benign essential hypertension  Blood pressure 148/77, reviewed blood pressure log blood pressure usually in 130s over 70s with the current regimen.  Will continue current regimen at this time, if continues to be uncontrolled we will add metoprolol       H/O syncope  BMP unremarkable except for slightly low potassium levels.  Advised potassium rich diet.  Will await results of Holter monitoring.              History of Present Illness   Amanda presented to office for follow-up of hypertension, overactive bladder and palpitations.  She also got blood work done and would like it to be reviewed.  She currently has a Holter monitor on and would be done with monitoring tomorrow.  She reports that Gemtesa is not in her insurance formulary, and we will need to get another medication for overactive bladder.  Gemtesa has been helping immensely but is most likely not covered by insurance.  Would like to discuss alternatives.      Review of Systems   Constitutional:  Negative for chills and fever.   HENT:  Negative for congestion.    Respiratory:  Negative for shortness of breath.    Cardiovascular:  Negative for chest pain.   Gastrointestinal:  Negative for diarrhea, nausea and vomiting.   Genitourinary:  Negative for difficulty urinating.    Neurological:  Negative for headaches.       Objective   /77 (BP Location: Left arm, Patient Position: Sitting, Cuff Size: Standard)   Pulse 71   Temp 98.7 °F (37.1 °C) (Temporal)   Resp 16   Wt 83.9 kg (185 lb)   SpO2 98%   BMI 34.96 kg/m²      Physical Exam  Constitutional:       Appearance: She is well-developed.   HENT:      Head: Normocephalic and atraumatic.      Right Ear: External ear normal.      Left Ear: External ear normal.   Eyes:      Conjunctiva/sclera: Conjunctivae normal.      Pupils: Pupils are equal, round, and reactive to light.   Cardiovascular:      Rate and Rhythm: Normal rate and regular rhythm.      Heart sounds: Normal heart sounds. No murmur heard.     No friction rub.   Pulmonary:      Effort: Pulmonary effort is normal. No respiratory distress.      Breath sounds: Normal breath sounds. No wheezing or rales.   Musculoskeletal:         General: Normal range of motion.      Cervical back: Normal range of motion and neck supple.   Skin:     General: Skin is warm and dry.   Neurological:      Mental Status: She is alert and oriented to person, place, and time.

## 2025-04-15 NOTE — ASSESSMENT & PLAN NOTE
Patient reports much improvement with Gemtesa, but unfortunately insurance does not cover it.  Discussed alternative Myrbetriq, but patient's blood pressure has been running high.  We will try Vesicare 5 mg at this time.  Discussed side effects.  Also discussed lifestyle modification with avoiding caffeine and sugary drinks.  Will follow-up in 3 months.  Orders:    solifenacin (VESICARE) 5 mg tablet; Take 1 tablet (5 mg total) by mouth daily

## 2025-04-20 DIAGNOSIS — I10 HYPERTENSION, UNSPECIFIED TYPE: ICD-10-CM

## 2025-04-21 RX ORDER — LOSARTAN POTASSIUM 100 MG/1
100 TABLET ORAL DAILY
Qty: 90 TABLET | Refills: 1 | Status: SHIPPED | OUTPATIENT
Start: 2025-04-21

## 2025-04-23 ENCOUNTER — RESULTS FOLLOW-UP (OUTPATIENT)
Dept: FAMILY MEDICINE CLINIC | Facility: CLINIC | Age: 80
End: 2025-04-23

## 2025-04-23 DIAGNOSIS — I10 BENIGN ESSENTIAL HYPERTENSION: Primary | ICD-10-CM

## 2025-04-23 PROCEDURE — 93227 XTRNL ECG REC<48 HR R&I: CPT | Performed by: INTERNAL MEDICINE

## 2025-04-23 RX ORDER — METOPROLOL SUCCINATE 25 MG/1
25 TABLET, EXTENDED RELEASE ORAL DAILY
Qty: 30 TABLET | Refills: 0 | Status: SHIPPED | OUTPATIENT
Start: 2025-04-23

## 2025-04-28 DIAGNOSIS — I10 BENIGN ESSENTIAL HYPERTENSION: ICD-10-CM

## 2025-04-28 DIAGNOSIS — K21.9 GASTROESOPHAGEAL REFLUX DISEASE WITHOUT ESOPHAGITIS: ICD-10-CM

## 2025-04-28 RX ORDER — AMLODIPINE BESYLATE 5 MG/1
5 TABLET ORAL DAILY
Qty: 90 TABLET | Refills: 0 | Status: SHIPPED | OUTPATIENT
Start: 2025-04-28

## 2025-04-28 RX ORDER — AMLODIPINE BESYLATE 2.5 MG/1
2.5 TABLET ORAL EVERY EVENING
Qty: 90 TABLET | Refills: 1 | Status: SHIPPED | OUTPATIENT
Start: 2025-04-28 | End: 2025-04-30 | Stop reason: SDUPTHER

## 2025-04-28 RX ORDER — OMEPRAZOLE 10 MG/1
10 CAPSULE, DELAYED RELEASE ORAL DAILY
Qty: 90 CAPSULE | Refills: 1 | Status: SHIPPED | OUTPATIENT
Start: 2025-04-28

## 2025-04-30 DIAGNOSIS — I10 BENIGN ESSENTIAL HYPERTENSION: ICD-10-CM

## 2025-04-30 RX ORDER — AMLODIPINE BESYLATE 2.5 MG/1
2.5 TABLET ORAL EVERY EVENING
Qty: 90 TABLET | Refills: 1 | Status: SHIPPED | OUTPATIENT
Start: 2025-04-30

## 2025-05-06 DIAGNOSIS — I10 BENIGN ESSENTIAL HYPERTENSION: ICD-10-CM

## 2025-05-06 DIAGNOSIS — N32.81 OAB (OVERACTIVE BLADDER): ICD-10-CM

## 2025-05-06 RX ORDER — SOLIFENACIN SUCCINATE 5 MG/1
5 TABLET, FILM COATED ORAL DAILY
Qty: 90 TABLET | Refills: 1 | Status: SHIPPED | OUTPATIENT
Start: 2025-05-06

## 2025-05-06 RX ORDER — METOPROLOL SUCCINATE 25 MG/1
12.5 TABLET, EXTENDED RELEASE ORAL DAILY
Start: 2025-05-06

## 2025-06-17 DIAGNOSIS — I10 BENIGN ESSENTIAL HYPERTENSION: ICD-10-CM

## 2025-06-17 RX ORDER — METOPROLOL SUCCINATE 25 MG/1
12.5 TABLET, EXTENDED RELEASE ORAL DAILY
Qty: 90 TABLET | Refills: 3 | Status: SHIPPED | OUTPATIENT
Start: 2025-06-17

## 2025-06-18 DIAGNOSIS — I10 BENIGN ESSENTIAL HYPERTENSION: ICD-10-CM

## 2025-06-18 RX ORDER — METOPROLOL SUCCINATE 25 MG/1
25 TABLET, EXTENDED RELEASE ORAL DAILY
Qty: 30 TABLET | OUTPATIENT
Start: 2025-06-18

## 2025-07-17 ENCOUNTER — OFFICE VISIT (OUTPATIENT)
Dept: FAMILY MEDICINE CLINIC | Facility: CLINIC | Age: 80
End: 2025-07-17

## 2025-07-17 VITALS
BODY MASS INDEX: 34.89 KG/M2 | HEART RATE: 69 BPM | RESPIRATION RATE: 20 BRPM | SYSTOLIC BLOOD PRESSURE: 148 MMHG | TEMPERATURE: 98 F | OXYGEN SATURATION: 99 % | HEIGHT: 61 IN | DIASTOLIC BLOOD PRESSURE: 74 MMHG | WEIGHT: 184.8 LBS

## 2025-07-17 DIAGNOSIS — I10 BENIGN ESSENTIAL HYPERTENSION: Primary | ICD-10-CM

## 2025-07-17 DIAGNOSIS — N32.81 OAB (OVERACTIVE BLADDER): ICD-10-CM

## 2025-07-17 DIAGNOSIS — E11.9 TYPE 2 DIABETES MELLITUS WITHOUT COMPLICATION, WITHOUT LONG-TERM CURRENT USE OF INSULIN (HCC): ICD-10-CM

## 2025-07-17 DIAGNOSIS — K21.9 GASTROESOPHAGEAL REFLUX DISEASE WITHOUT ESOPHAGITIS: ICD-10-CM

## 2025-07-17 DIAGNOSIS — E78.5 HYPERLIPIDEMIA, UNSPECIFIED HYPERLIPIDEMIA TYPE: ICD-10-CM

## 2025-07-17 PROCEDURE — 99214 OFFICE O/P EST MOD 30 MIN: CPT | Performed by: FAMILY MEDICINE

## 2025-07-17 PROCEDURE — G2211 COMPLEX E/M VISIT ADD ON: HCPCS | Performed by: FAMILY MEDICINE

## 2025-07-17 RX ORDER — ATORVASTATIN CALCIUM 10 MG/1
10 TABLET, FILM COATED ORAL
Qty: 90 TABLET | Refills: 3 | Status: SHIPPED | OUTPATIENT
Start: 2025-07-17

## 2025-07-17 RX ORDER — OMEPRAZOLE 10 MG/1
10 CAPSULE, DELAYED RELEASE ORAL
Qty: 90 CAPSULE | Refills: 1 | Status: SHIPPED | OUTPATIENT
Start: 2025-07-17

## 2025-07-17 RX ORDER — METFORMIN HYDROCHLORIDE 500 MG/1
500 TABLET, EXTENDED RELEASE ORAL
Qty: 90 TABLET | Refills: 1 | Status: SHIPPED | OUTPATIENT
Start: 2025-07-17

## 2025-07-17 NOTE — ASSESSMENT & PLAN NOTE
Continue omeprazole as needed  Orders:    omeprazole (PriLOSEC) 10 mg delayed release capsule; Take 1 capsule (10 mg total) by mouth daily before breakfast

## 2025-07-17 NOTE — ASSESSMENT & PLAN NOTE
Lab Results   Component Value Date    HGBA1C 6.0 (H) 01/24/2025   Continue metformin 500 mg daily.  Lifestyle modification with diet and exercise    Orders:    metFORMIN (GLUCOPHAGE-XR) 500 mg 24 hr tablet; Take 1 tablet (500 mg total) by mouth daily with breakfast

## 2025-07-17 NOTE — ASSESSMENT & PLAN NOTE
Continue Lipitor for hyperlipidemia  Orders:    atorvastatin (LIPITOR) 10 mg tablet; Take 1 tablet (10 mg total) by mouth daily at bedtime

## 2025-07-17 NOTE — PROGRESS NOTES
Name: Amanda Naik      : 1945      MRN: [id number]  Encounter Provider: Lexis Dutton MD  Encounter Date: 2025   Encounter department: [facility] [place]  :  Assessment & Plan  Type 2 diabetes mellitus without complication, without long-term current use of insulin ([facility])    Lab Results   Component Value Date    HGBA1C 6.0 (H) 2025   Continue metformin 500 mg daily.  Lifestyle modification with diet and exercise    Orders:    metFORMIN (GLUCOPHAGE-XR) 500 mg 24 hr tablet; Take 1 tablet (500 mg total) by mouth daily with breakfast    Gastroesophageal reflux disease without esophagitis  Continue omeprazole as needed  Orders:    omeprazole (PriLOSEC) 10 mg delayed release capsule; Take 1 capsule (10 mg total) by mouth daily before breakfast    Hyperlipidemia, unspecified hyperlipidemia type  Continue Lipitor for hyperlipidemia  Orders:    atorvastatin (LIPITOR) 10 mg tablet; Take 1 tablet (10 mg total) by mouth daily at bedtime    Benign essential hypertension  Blood pressure on arrival 148/74.  On recheck still the same.  Blood pressures at home within goal, reviewed the log with the patient.  Will continue current regimen at this time and have patient follow-up in 3 months.       OAB (overactive bladder)  Had side effects with Vesicare, continue lifestyle modification.  Follow-up as needed.              History of Present Illness   Amanda presents to the office for follow-up of hypertension, overactive bladder.  She reports she is doing well.  She has been keeping a log of her blood pressures.  At home her blood pressures range between 120s and 130s systolic and 70s diastolic.  Her heart rate ranges from 55-76.  She reports she is doing well, she has no side effects from the medications.  She also has noticed improvement in her palpitations since she was started on metoprolol.  She reports that she had really bad constipation from Vesicare and discontinued it 2 months  "ago.  She does not have any significant symptoms of overactive bladder at this time, but will monitor and follow-up accordingly.      Review of Systems   Constitutional:  Negative for chills and fever.   HENT:  Negative for congestion.    Respiratory:  Negative for shortness of breath.    Cardiovascular:  Negative for chest pain.   Gastrointestinal:  Negative for diarrhea, nausea and vomiting.   Genitourinary:  Negative for difficulty urinating.   Neurological:  Negative for headaches.       Objective   /74   Pulse 69   Temp 98 °F (36.7 °C) (Temporal)   Resp 20   Ht 5' 1\" (1.549 m)   Wt 83.8 kg (184 lb 12.8 oz)   SpO2 99%   BMI 34.92 kg/m²      Physical Exam  Constitutional:       Appearance: She is well-developed.   HENT:      Head: Normocephalic and atraumatic.      Right Ear: External ear normal.      Left Ear: External ear normal.     Eyes:      Conjunctiva/sclera: Conjunctivae normal.      Pupils: Pupils are equal, round, and reactive to light.       Cardiovascular:      Rate and Rhythm: Normal rate and regular rhythm.      Heart sounds: Normal heart sounds. No murmur heard.     No friction rub.   Pulmonary:      Effort: Pulmonary effort is normal. No respiratory distress.      Breath sounds: Normal breath sounds. No wheezing or rales.   Abdominal:      General: Bowel sounds are normal.     Musculoskeletal:         General: Normal range of motion.      Cervical back: Normal range of motion and neck supple.     Skin:     General: Skin is warm and dry.     Neurological:      Mental Status: She is alert and oriented to person, place, and time.         "

## 2025-07-17 NOTE — ASSESSMENT & PLAN NOTE
Blood pressure on arrival 148/74.  On recheck still the same.  Blood pressures at home within goal, reviewed the log with the patient.  Will continue current regimen at this time and have patient follow-up in 3 months.

## 2025-07-24 DIAGNOSIS — I10 BENIGN ESSENTIAL HYPERTENSION: ICD-10-CM

## 2025-07-24 RX ORDER — AMLODIPINE BESYLATE 5 MG/1
5 TABLET ORAL DAILY
Qty: 90 TABLET | Refills: 0 | Status: SHIPPED | OUTPATIENT
Start: 2025-07-24

## 2025-08-20 DIAGNOSIS — I10 BENIGN ESSENTIAL HYPERTENSION: ICD-10-CM

## 2025-08-20 DIAGNOSIS — I10 HYPERTENSION, UNSPECIFIED TYPE: ICD-10-CM

## 2025-08-20 RX ORDER — AMLODIPINE BESYLATE 5 MG/1
5 TABLET ORAL DAILY
Qty: 90 TABLET | Refills: 0 | Status: SHIPPED | OUTPATIENT
Start: 2025-08-20

## 2025-08-20 RX ORDER — LOSARTAN POTASSIUM 100 MG/1
100 TABLET ORAL DAILY
Qty: 90 TABLET | Refills: 1 | Status: SHIPPED | OUTPATIENT
Start: 2025-08-20

## 2025-08-22 ENCOUNTER — HOSPITAL ENCOUNTER (OUTPATIENT)
Dept: RADIOLOGY | Age: 80
Discharge: HOME | End: 2025-08-22
Payer: MEDICARE

## 2025-08-22 VITALS — BODY MASS INDEX: 34.74 KG/M2 | HEIGHT: 61 IN | WEIGHT: 184 LBS

## 2025-08-22 DIAGNOSIS — Z00.00 MEDICARE ANNUAL WELLNESS VISIT, SUBSEQUENT: ICD-10-CM

## 2025-08-22 PROCEDURE — 77067 SCR MAMMO BI INCL CAD: CPT

## 2025-08-22 PROCEDURE — 77063 BREAST TOMOSYNTHESIS BI: CPT
